# Patient Record
Sex: MALE | Race: WHITE | NOT HISPANIC OR LATINO | Employment: OTHER | URBAN - METROPOLITAN AREA
[De-identification: names, ages, dates, MRNs, and addresses within clinical notes are randomized per-mention and may not be internally consistent; named-entity substitution may affect disease eponyms.]

---

## 2023-05-17 ENCOUNTER — TELEPHONE (OUTPATIENT)
Dept: PRIMARY CARE | Facility: CLINIC | Age: 68
End: 2023-05-17
Payer: COMMERCIAL

## 2023-06-27 ENCOUNTER — TELEPHONE (OUTPATIENT)
Dept: PRIMARY CARE | Facility: CLINIC | Age: 68
End: 2023-06-27
Payer: COMMERCIAL

## 2023-11-06 ENCOUNTER — TELEPHONE (OUTPATIENT)
Dept: PRIMARY CARE | Facility: CLINIC | Age: 68
End: 2023-11-06
Payer: COMMERCIAL

## 2023-11-06 NOTE — TELEPHONE ENCOUNTER
Pt wife called and st that pt just got out of [facility] and was discharged last Monday. He needs an order sent in to them for home health care services. Can you put an order in so they can begin this?

## 2023-11-09 ENCOUNTER — OFFICE VISIT (OUTPATIENT)
Dept: PRIMARY CARE | Facility: CLINIC | Age: 68
End: 2023-11-09
Payer: COMMERCIAL

## 2023-11-09 VITALS — OXYGEN SATURATION: 98 % | DIASTOLIC BLOOD PRESSURE: 80 MMHG | SYSTOLIC BLOOD PRESSURE: 120 MMHG | HEART RATE: 78 BPM

## 2023-11-09 DIAGNOSIS — G47.09 OTHER INSOMNIA: ICD-10-CM

## 2023-11-09 DIAGNOSIS — I69.351 HEMIPLEGIA AND HEMIPARESIS FOLLOWING CEREBRAL INFARCTION AFFECTING RIGHT DOMINANT SIDE (MULTI): ICD-10-CM

## 2023-11-09 DIAGNOSIS — F32.2 AGITATED DEPRESSION (MULTI): ICD-10-CM

## 2023-11-09 DIAGNOSIS — R45.1 RESTLESSNESS AND AGITATION: ICD-10-CM

## 2023-11-09 DIAGNOSIS — Z93.0 TRACHEOSTOMY STATUS (MULTI): ICD-10-CM

## 2023-11-09 DIAGNOSIS — I69.320 APHASIA FOLLOWING CEREBRAL INFARCTION: ICD-10-CM

## 2023-11-09 DIAGNOSIS — Z23 NEED FOR INFLUENZA VACCINATION: ICD-10-CM

## 2023-11-09 DIAGNOSIS — S82.831S CLOSED FRACTURE OF DISTAL END OF RIGHT FIBULA, UNSPECIFIED FRACTURE MORPHOLOGY, SEQUELA: ICD-10-CM

## 2023-11-09 DIAGNOSIS — I69.391 DYSPHAGIA FOLLOWING CEREBRAL INFARCTION: ICD-10-CM

## 2023-11-09 DIAGNOSIS — I63.9 ISCHEMIC CEREBROVASCULAR ACCIDENT (CVA) (MULTI): Primary | ICD-10-CM

## 2023-11-09 DIAGNOSIS — R53.1 GENERALIZED WEAKNESS: ICD-10-CM

## 2023-11-09 DIAGNOSIS — J95.00 TRACHEOSTOMY COMPLICATION, UNSPECIFIED COMPLICATION TYPE (MULTI): ICD-10-CM

## 2023-11-09 DIAGNOSIS — E78.2 MIXED HYPERLIPIDEMIA: ICD-10-CM

## 2023-11-09 DIAGNOSIS — F41.8 DEPRESSION WITH ANXIETY: ICD-10-CM

## 2023-11-09 DIAGNOSIS — I10 PRIMARY HYPERTENSION: ICD-10-CM

## 2023-11-09 PROBLEM — G47.00 INSOMNIA, UNSPECIFIED: Status: ACTIVE | Noted: 2023-08-12

## 2023-11-09 PROBLEM — Z99.11: Status: ACTIVE | Noted: 2023-06-16

## 2023-11-09 PROBLEM — H61.22 IMPACTED CERUMEN OF LEFT EAR: Status: ACTIVE | Noted: 2023-11-09

## 2023-11-09 PROBLEM — E78.5 HYPERLIPIDEMIA: Status: ACTIVE | Noted: 2023-06-09

## 2023-11-09 PROBLEM — M62.81 MUSCLE WEAKNESS (GENERALIZED): Status: ACTIVE | Noted: 2023-08-11

## 2023-11-09 PROBLEM — K59.00 CONSTIPATION, UNSPECIFIED: Status: ACTIVE | Noted: 2023-08-11

## 2023-11-09 PROBLEM — R26.89 OTHER ABNORMALITIES OF GAIT AND MOBILITY: Status: ACTIVE | Noted: 2023-08-11

## 2023-11-09 PROBLEM — E66.812 OBESITY, CLASS II, BMI 35-39.9: Status: ACTIVE | Noted: 2023-06-09

## 2023-11-09 PROBLEM — S82.891A CLOSED FRACTURE OF RIGHT ANKLE: Status: ACTIVE | Noted: 2023-06-14

## 2023-11-09 PROBLEM — G93.6 CEREBRAL EDEMA (MULTI): Status: ACTIVE | Noted: 2023-06-09

## 2023-11-09 PROBLEM — I63.132: Status: ACTIVE | Noted: 2023-08-11

## 2023-11-09 PROBLEM — E66.9 OBESITY: Status: ACTIVE | Noted: 2023-11-09

## 2023-11-09 PROBLEM — R01.1 MURMUR, HEART: Status: ACTIVE | Noted: 2023-11-09

## 2023-11-09 PROBLEM — E66.9 OBESITY, CLASS II, BMI 35-39.9: Status: ACTIVE | Noted: 2023-06-09

## 2023-11-09 PROBLEM — R73.9 HYPERGLYCEMIA, UNSPECIFIED: Status: ACTIVE | Noted: 2023-08-11

## 2023-11-09 PROBLEM — J96.10 CHRONIC RESPIRATORY FAILURE (MULTI): Status: ACTIVE | Noted: 2023-08-17

## 2023-11-09 PROBLEM — I62.9 INTRACRANIAL HEMORRHAGE (MULTI): Status: ACTIVE | Noted: 2023-06-08

## 2023-11-09 PROBLEM — F32.A DEPRESSION, UNSPECIFIED: Status: ACTIVE | Noted: 2023-08-11

## 2023-11-09 PROCEDURE — 1160F RVW MEDS BY RX/DR IN RCRD: CPT | Performed by: FAMILY MEDICINE

## 2023-11-09 PROCEDURE — 1036F TOBACCO NON-USER: CPT | Performed by: FAMILY MEDICINE

## 2023-11-09 PROCEDURE — 90471 IMMUNIZATION ADMIN: CPT | Performed by: FAMILY MEDICINE

## 2023-11-09 PROCEDURE — 90662 IIV NO PRSV INCREASED AG IM: CPT | Performed by: FAMILY MEDICINE

## 2023-11-09 PROCEDURE — 3079F DIAST BP 80-89 MM HG: CPT | Performed by: FAMILY MEDICINE

## 2023-11-09 PROCEDURE — 1159F MED LIST DOCD IN RCRD: CPT | Performed by: FAMILY MEDICINE

## 2023-11-09 PROCEDURE — 3074F SYST BP LT 130 MM HG: CPT | Performed by: FAMILY MEDICINE

## 2023-11-09 PROCEDURE — 99215 OFFICE O/P EST HI 40 MIN: CPT | Performed by: FAMILY MEDICINE

## 2023-11-09 RX ORDER — MIRTAZAPINE 15 MG/1
15 TABLET, FILM COATED ORAL NIGHTLY
COMMUNITY
Start: 2023-10-26 | End: 2023-11-09 | Stop reason: SDUPTHER

## 2023-11-09 RX ORDER — ATORVASTATIN CALCIUM 40 MG/1
40 TABLET, FILM COATED ORAL DAILY
Qty: 90 TABLET | Refills: 1 | Status: SHIPPED | OUTPATIENT
Start: 2023-11-09 | End: 2024-03-15 | Stop reason: SDUPTHER

## 2023-11-09 RX ORDER — QUETIAPINE FUMARATE 25 MG/1
25 TABLET, FILM COATED ORAL NIGHTLY
Qty: 90 TABLET | Refills: 1 | Status: SHIPPED | OUTPATIENT
Start: 2023-11-09 | End: 2024-03-15 | Stop reason: SDUPTHER

## 2023-11-09 RX ORDER — MIRTAZAPINE 15 MG/1
15 TABLET, FILM COATED ORAL NIGHTLY
Qty: 90 TABLET | Refills: 1 | Status: SHIPPED | OUTPATIENT
Start: 2023-11-09 | End: 2024-02-19 | Stop reason: SDUPTHER

## 2023-11-09 RX ORDER — SERTRALINE HYDROCHLORIDE 100 MG/1
100 TABLET, FILM COATED ORAL DAILY
Qty: 100 TABLET | Refills: 1 | Status: SHIPPED | OUTPATIENT
Start: 2023-11-09 | End: 2024-03-15 | Stop reason: SDUPTHER

## 2023-11-09 RX ORDER — GUAIFENESIN 100 MG/5ML
200 SOLUTION ORAL 3 TIMES DAILY PRN
Qty: 120 ML | Refills: 3 | Status: SHIPPED | OUTPATIENT
Start: 2023-11-09 | End: 2023-11-19

## 2023-11-09 RX ORDER — FAMOTIDINE 20 MG/1
20 TABLET, FILM COATED ORAL 2 TIMES DAILY
Qty: 180 TABLET | Refills: 1 | Status: SHIPPED | OUTPATIENT
Start: 2023-11-09 | End: 2024-03-15 | Stop reason: SDUPTHER

## 2023-11-09 RX ORDER — FAMOTIDINE 20 MG/1
20 TABLET, FILM COATED ORAL DAILY
COMMUNITY
Start: 2023-07-20 | End: 2023-11-09 | Stop reason: SDUPTHER

## 2023-11-09 RX ORDER — ATORVASTATIN CALCIUM 40 MG/1
40 TABLET, FILM COATED ORAL DAILY
COMMUNITY
Start: 2023-06-21 | End: 2023-11-09 | Stop reason: SDUPTHER

## 2023-11-09 RX ORDER — SERTRALINE HYDROCHLORIDE 100 MG/1
100 TABLET, FILM COATED ORAL DAILY
COMMUNITY
Start: 2023-10-30 | End: 2023-11-09 | Stop reason: SDUPTHER

## 2023-11-09 RX ORDER — QUETIAPINE FUMARATE 25 MG/1
25 TABLET, FILM COATED ORAL NIGHTLY
COMMUNITY
Start: 2023-10-24 | End: 2023-11-09 | Stop reason: SDUPTHER

## 2023-11-09 RX ORDER — ONDANSETRON 4 MG/1
4 TABLET, FILM COATED ORAL EVERY 8 HOURS PRN
COMMUNITY
End: 2024-03-15 | Stop reason: ALTCHOICE

## 2023-11-09 ASSESSMENT — ENCOUNTER SYMPTOMS
LOSS OF SENSATION IN FEET: 0
DEPRESSION: 1
OCCASIONAL FEELINGS OF UNSTEADINESS: 1

## 2023-11-09 NOTE — PROGRESS NOTES
Subjective   Patient ID: New Pierre is a 68 y.o. male who presents for Nursing Home Follow Up.  HPI    Presented to Ed at [facility] 6/8/2023due to slurred speech and suspected CVA.  He had called his wife and told her he did not feel well - was slurring and she called EMS and headed to job site as she was concerned that he might be having a stroke.  He was on work site and had fallen between time of call to his wife and the time EMS arrived to find him    CT with ischemic stroke noted -     Tracheotomy and PEG tube placed  6/17/2023 for suspected dysphagia and the secretions worsening    Transferred to [facility] step-down type facility on 6/21/2023    Transferred to Rehab facility - the [facility]   PEG tube removed     8/17/2023 Tracheostomy revision after the patient dislodged the tube    10/15/2023 - ED visit after dislodging Tracheostomy tube    He is able to eat solids without reflux    Neurology previously followed by YSABEL Martínez - no follow up appointment scheduled    He is currently living at home since 10/30/2023.  No home care evaluation yet - coming tomorrow.    NO ortho follow up scheduled - review of xrays with commuted fracture of distal femur     INCONTINENCE: interested in a condom style catheter as he has difficulty reaching the toilet in time  They currently have a private company providing homecare for him.    Review of Systems    Review of Systems negative except as noted in HPI and Chief complaint.     Objective               Physical Exam  HENT:      Head:      Comments: Right facial droop  Cardiovascular:      Rate and Rhythm: Normal rate and regular rhythm.      Heart sounds: Normal heart sounds. No murmur heard.  Pulmonary:      Effort: Pulmonary effort is normal.      Breath sounds: Normal breath sounds. No wheezing or rhonchi.   Musculoskeletal:      Comments: Right upper extremity hemiparalysis with some contracture noted  Right legt with hemiparalysis noted, foot drop    Skin:     Comments: Tracheostomy site with healing granulomatous tissue   Neurological:      Mental Status: He is alert.   Psychiatric:      Comments: Aphasia noted - able to answer in whispers, good eye contact.       /80 (BP Location: Left arm, Patient Position: Sitting, BP Cuff Size: Adult)   Pulse 78   SpO2 98%      Assessment/Plan   Problem List Items Addressed This Visit       Tracheostomy complication (CMS/[facility])     Homecare ordered - will have healing tracheostomy site evaluation.  Consider referral to wound care if not improving.         Relevant Medications    guaiFENesin (Robitussin) 100 mg/5 mL syrup    Restlessness and agitation     Continuing with Seroquel for prn agitation.         Ischemic cerebrovascular accident (CVA) (CMS/[facility]) - Primary    Relevant Orders    Referral to Home Care    Referral to Neurology    Insomnia, unspecified     Continue with Mirtazapine at current dosage.  Follow up at least annually.         Relevant Medications    mirtazapine (Remeron) 15 mg tablet    QUEtiapine (SEROquel) 25 mg tablet    Hypertension    Relevant Orders    Referral to Home Care    CBC    Hyperlipidemia     Continue with Atorvastatin pending review of fasting labs.         Relevant Medications    atorvastatin (Lipitor) 40 mg tablet    Other Relevant Orders    Referral to Home Care    Comprehensive metabolic panel    Lipid Panel    Hemiplegia and hemiparesis following cerebral infarction affecting right dominant side (CMS/[facility])     Homecare and PT ordered.         Relevant Orders    Referral to Home Care    Tracheostomy status (CMS/[facility])    Relevant Orders    Referral to Home Care    Dysphagia following cerebral infarction     Home care evaluation for OT/ST and possible swallowing evaluation.         Relevant Medications    famotidine (Pepcid) 20 mg tablet    Other Relevant Orders    Referral to Home Care    Aphasia following cerebral infarction     Homecare evaluation ordered.  Recommend ST for speech  issues.         Relevant Orders    Referral to Home Care     Other Visit Diagnoses       Generalized weakness        Relevant Orders    Referral to Home Care    Depression with anxiety        Relevant Medications    sertraline (Zoloft) 100 mg tablet    Agitated depression (CMS/HCC)        Relevant Medications    mirtazapine (Remeron) 15 mg tablet    Closed fracture of distal end of right fibula, unspecified fracture morphology, sequela        Relevant Orders    Referral to Orthopaedic Surgery    Need for influenza vaccination        Relevant Orders    Flu vaccine, quadrivalent, high-dose, preservative free, age 65y+ (FLUZONE) (Completed)            Time Spent  Prep time on day of patient encounter: 5 minutes  Time spent directly with patient, family or caregiver: 30 minutes  Additional Time Spent on Patient Care Activities: 5 minutes  Documentation Time: 10 minutes  Other Time Spent: 0 minutes  Total: 50 minutes    Patient was identified as a fall risk. Risk prevention instructions provided.    FOLLOW UP 1 MONTH.

## 2023-11-10 NOTE — PATIENT INSTRUCTIONS

## 2023-11-10 NOTE — ASSESSMENT & PLAN NOTE
Homecare ordered - will have healing tracheostomy site evaluation.  Consider referral to wound care if not improving.

## 2023-11-13 ENCOUNTER — APPOINTMENT (OUTPATIENT)
Dept: ORTHOPEDIC SURGERY | Facility: CLINIC | Age: 68
End: 2023-11-13
Payer: COMMERCIAL

## 2023-11-13 ENCOUNTER — LAB (OUTPATIENT)
Dept: LAB | Facility: LAB | Age: 68
End: 2023-11-13
Payer: COMMERCIAL

## 2023-11-13 DIAGNOSIS — E78.2 MIXED HYPERLIPIDEMIA: ICD-10-CM

## 2023-11-13 DIAGNOSIS — I10 PRIMARY HYPERTENSION: ICD-10-CM

## 2023-11-13 LAB
ALBUMIN SERPL BCP-MCNC: 3.7 G/DL (ref 3.4–5)
ALP SERPL-CCNC: 80 U/L (ref 33–136)
ALT SERPL W P-5'-P-CCNC: 11 U/L (ref 10–52)
ANION GAP SERPL CALC-SCNC: 13 MMOL/L (ref 10–20)
AST SERPL W P-5'-P-CCNC: 15 U/L (ref 9–39)
BILIRUB SERPL-MCNC: 0.7 MG/DL (ref 0–1.2)
BUN SERPL-MCNC: 16 MG/DL (ref 6–23)
CALCIUM SERPL-MCNC: 9.4 MG/DL (ref 8.6–10.6)
CHLORIDE SERPL-SCNC: 105 MMOL/L (ref 98–107)
CHOLEST SERPL-MCNC: 128 MG/DL (ref 0–199)
CHOLESTEROL/HDL RATIO: 3.6
CO2 SERPL-SCNC: 28 MMOL/L (ref 21–32)
CREAT SERPL-MCNC: 0.62 MG/DL (ref 0.5–1.3)
ERYTHROCYTE [DISTWIDTH] IN BLOOD BY AUTOMATED COUNT: 13.7 % (ref 11.5–14.5)
GFR SERPL CREATININE-BSD FRML MDRD: >90 ML/MIN/1.73M*2
GLUCOSE SERPL-MCNC: 105 MG/DL (ref 74–99)
HCT VFR BLD AUTO: 45.3 % (ref 41–52)
HDLC SERPL-MCNC: 35.9 MG/DL
HGB BLD-MCNC: 14.7 G/DL (ref 13.5–17.5)
LDLC SERPL CALC-MCNC: 63 MG/DL
MCH RBC QN AUTO: 29.4 PG (ref 26–34)
MCHC RBC AUTO-ENTMCNC: 32.5 G/DL (ref 32–36)
MCV RBC AUTO: 91 FL (ref 80–100)
NON HDL CHOLESTEROL: 92 MG/DL (ref 0–149)
NRBC BLD-RTO: 0 /100 WBCS (ref 0–0)
PLATELET # BLD AUTO: 316 X10*3/UL (ref 150–450)
POTASSIUM SERPL-SCNC: 4.2 MMOL/L (ref 3.5–5.3)
PROT SERPL-MCNC: 6.4 G/DL (ref 6.4–8.2)
RBC # BLD AUTO: 5 X10*6/UL (ref 4.5–5.9)
SODIUM SERPL-SCNC: 142 MMOL/L (ref 136–145)
TRIGL SERPL-MCNC: 148 MG/DL (ref 0–149)
VLDL: 30 MG/DL (ref 0–40)
WBC # BLD AUTO: 7.3 X10*3/UL (ref 4.4–11.3)

## 2023-11-13 PROCEDURE — 85027 COMPLETE CBC AUTOMATED: CPT

## 2023-11-13 PROCEDURE — 36415 COLL VENOUS BLD VENIPUNCTURE: CPT

## 2023-11-13 PROCEDURE — 80061 LIPID PANEL: CPT

## 2023-11-13 PROCEDURE — 80053 COMPREHEN METABOLIC PANEL: CPT

## 2023-11-16 ENCOUNTER — ANCILLARY PROCEDURE (OUTPATIENT)
Dept: RADIOLOGY | Facility: CLINIC | Age: 68
End: 2023-11-16
Payer: COMMERCIAL

## 2023-11-16 ENCOUNTER — OFFICE VISIT (OUTPATIENT)
Dept: ORTHOPEDIC SURGERY | Facility: CLINIC | Age: 68
End: 2023-11-16
Payer: COMMERCIAL

## 2023-11-16 VITALS — HEIGHT: 68 IN | BODY MASS INDEX: 26.83 KG/M2 | WEIGHT: 177 LBS

## 2023-11-16 DIAGNOSIS — M25.571 RIGHT ANKLE PAIN, UNSPECIFIED CHRONICITY: ICD-10-CM

## 2023-11-16 PROCEDURE — 99204 OFFICE O/P NEW MOD 45 MIN: CPT | Performed by: SPECIALIST

## 2023-11-16 PROCEDURE — 1160F RVW MEDS BY RX/DR IN RCRD: CPT | Performed by: SPECIALIST

## 2023-11-16 PROCEDURE — 73610 X-RAY EXAM OF ANKLE: CPT | Mod: RIGHT SIDE | Performed by: RADIOLOGY

## 2023-11-16 PROCEDURE — 73610 X-RAY EXAM OF ANKLE: CPT | Mod: RT,FY

## 2023-11-16 PROCEDURE — L1902 AFO ANKLE GAUNTLET PRE OTS: HCPCS | Performed by: SPECIALIST

## 2023-11-16 PROCEDURE — 1159F MED LIST DOCD IN RCRD: CPT | Performed by: SPECIALIST

## 2023-11-16 PROCEDURE — 1036F TOBACCO NON-USER: CPT | Performed by: SPECIALIST

## 2023-11-16 NOTE — PROGRESS NOTES
June 8th  - patient had a stroke and fell.   His caretaker states that he was in a cast until around end of July or early August   States he is no longer complaining of pain, he is at a rehab and they want to start PT - wanted his weight bearing status updated.   No surgery was done     Xrays done today    History of Present Illness: Right ankle fracture    Mechanism of injury: Fall during stroke  Date of Injury/Pain: Above  Location of pain: None  Quality of pain: None  Frequency of Pain: None  Associated symptoms: Right hemiparesis  Previous treatment: Stroke related    27 point review of systems negative except what is stated in HPI    On examination of the right ankle/foot:  Wheelchair-bound currently gait  Neutral alignment  Minimal swelling; none ecchymosis or erythema. Skin intact; no ulcers or lesions.   Full passive   ROM in  ankle plantarflexion, dorsiflexion, inversion and eversion; normal ROM in 2-5th toe flexion/extension and 1st MTP flexion/extension  Right-sided hemiparesis without active motion  Tenderness to palpation: None  No tenderness to palpation over the Achilles, medial and lateral malleolus, posterior tibial tendon, peroneal tendon, ATFL, deltoid ligament, talus or navicular.  No tenderness to palpation over heel, plantar arch, base of 1st metatarsal/2nd metatarsal, sesamoids, 5th metatarsal, medial cuneiform, navicular, 1st MTP joint or 2nd or 3rd intermetarsal space.  Diminished motor and sensation. Good capillary refill. No sensory deficit to light touch. Normal proprioception. Dorsalis pedis and posterior tibial pulses 2+ bilaterally.        I personally reviewed the patient's x-ray images and reports of the right ankle. The xrays show remote Anne B type lateral malleolus fracture with complete healing, slight malunion with rotation and shortening but mortise stable    ASSESSMENT: Mall union right lateral malleolus ankle fracture.  On exam and radiograph appears stable enough to allow  weightbearing, but would place him in an athletic type ankle brace today for more confidence and support.    PLAN: Treatment options were discussed with the patient.  Progress with weightbearing as tolerated physical therapy with light weight bracing as needed.  Follow-up as needed    This note was dictated using speech recognition software and was not corrected for spelling or grammatical errors

## 2023-11-28 ENCOUNTER — TELEPHONE (OUTPATIENT)
Dept: PRIMARY CARE | Facility: CLINIC | Age: 68
End: 2023-11-28
Payer: COMMERCIAL

## 2023-11-28 NOTE — TELEPHONE ENCOUNTER
Home Care nurse calling in asking if you would be able to write an RX for an arm sling for patient?    Pended order.

## 2023-11-30 ENCOUNTER — APPOINTMENT (OUTPATIENT)
Dept: NEUROLOGY | Facility: CLINIC | Age: 68
End: 2023-11-30
Payer: COMMERCIAL

## 2023-11-30 DIAGNOSIS — I69.959 SPASTIC HEMIPLEGIA AS LATE EFFECT OF CEREBROVASCULAR DISEASE, UNSPECIFIED CEREBROVASCULAR DISEASE TYPE, UNSPECIFIED LATERALITY (MULTI): ICD-10-CM

## 2023-11-30 DIAGNOSIS — I63.9 ISCHEMIC CEREBROVASCULAR ACCIDENT (CVA) (MULTI): Primary | ICD-10-CM

## 2023-12-04 ENCOUNTER — APPOINTMENT (OUTPATIENT)
Dept: PRIMARY CARE | Facility: CLINIC | Age: 68
End: 2023-12-04
Payer: COMMERCIAL

## 2023-12-19 ENCOUNTER — OFFICE VISIT (OUTPATIENT)
Dept: PRIMARY CARE | Facility: CLINIC | Age: 68
End: 2023-12-19
Payer: COMMERCIAL

## 2023-12-19 VITALS — DIASTOLIC BLOOD PRESSURE: 80 MMHG | HEART RATE: 80 BPM | OXYGEN SATURATION: 97 % | SYSTOLIC BLOOD PRESSURE: 120 MMHG

## 2023-12-19 DIAGNOSIS — R01.1 MURMUR: Primary | ICD-10-CM

## 2023-12-19 DIAGNOSIS — I10 PRIMARY HYPERTENSION: ICD-10-CM

## 2023-12-19 DIAGNOSIS — E78.2 MIXED HYPERLIPIDEMIA: ICD-10-CM

## 2023-12-19 DIAGNOSIS — I63.9 ISCHEMIC CEREBROVASCULAR ACCIDENT (CVA) (MULTI): ICD-10-CM

## 2023-12-19 DIAGNOSIS — E66.3 OVERWEIGHT WITH BODY MASS INDEX (BMI) OF 27 TO 27.9 IN ADULT: ICD-10-CM

## 2023-12-19 DIAGNOSIS — I69.391 DYSPHAGIA FOLLOWING CEREBRAL INFARCTION: ICD-10-CM

## 2023-12-19 PROCEDURE — 3079F DIAST BP 80-89 MM HG: CPT | Performed by: FAMILY MEDICINE

## 2023-12-19 PROCEDURE — 3008F BODY MASS INDEX DOCD: CPT | Performed by: FAMILY MEDICINE

## 2023-12-19 PROCEDURE — 3074F SYST BP LT 130 MM HG: CPT | Performed by: FAMILY MEDICINE

## 2023-12-19 PROCEDURE — 99214 OFFICE O/P EST MOD 30 MIN: CPT | Performed by: FAMILY MEDICINE

## 2023-12-19 PROCEDURE — 1159F MED LIST DOCD IN RCRD: CPT | Performed by: FAMILY MEDICINE

## 2023-12-19 PROCEDURE — 1036F TOBACCO NON-USER: CPT | Performed by: FAMILY MEDICINE

## 2023-12-19 PROCEDURE — 1160F RVW MEDS BY RX/DR IN RCRD: CPT | Performed by: FAMILY MEDICINE

## 2023-12-19 RX ORDER — ASPIRIN 325 MG
325 TABLET ORAL DAILY
COMMUNITY

## 2023-12-19 NOTE — PATIENT INSTRUCTIONS

## 2023-12-19 NOTE — PROGRESS NOTES
Subjective   Patient ID: New Pierre is a 68 y.o. male who presents for Follow-up.    HPI    S/P CVA and LEFT carotid stent placement  Initial CVA     ECHO RESULTS REVIEWED FROM [facility] 6/8/2023:  CONCLUSIONS:   - Exam indication: Stroke   - The left ventricle is normal in size. There is no left ventricular hypertrophy.   Left ventricular systolic function is normal. EF = 50-55% (visual est.) Definity   contrast used for endocardial border detection. Normal left ventricular diastolic   function.   - The right ventricle is normal in size. Right ventricular systolic function is   normal.   - Very mild AS present.   - There is no patent foramen ovale as detected by Doppler and agitated saline   contrast.   Agiated saline was performed and showed no signs of PFO.   - The patient has not had a prior  echocardiographic exam for comparison.      No SOB, no chest pain,no edema    OT and PT: coming to house 2 times per week and now reducing to once weekly.    Using catheter as not able to get to the bathroom regularly without assistance    Eating sitting up in recliner to eat - no choking or swallowing difficulties.    Review of Systems    Review of Systems negative except as noted in HPI and Chief complaint.     Objective                 /80 (BP Location: Left arm, Patient Position: Sitting, BP Cuff Size: Adult)   Pulse 80   SpO2 97%      Physical Exam  Constitutional:       General: He is not in acute distress.     Appearance: Normal appearance. He is not ill-appearing.   HENT:      Head: Normocephalic and atraumatic.      Right Ear: Tympanic membrane and external ear normal. There is no impacted cerumen.      Left Ear: Tympanic membrane and external ear normal. There is no impacted cerumen.      Nose: Nose normal.   Eyes:      Conjunctiva/sclera: Conjunctivae normal.   Neck:      Vascular: No carotid bruit.   Cardiovascular:      Rate and Rhythm: Normal rate and regular rhythm.      Pulses: Normal pulses.       Heart sounds: Normal heart sounds. No murmur heard.     No gallop.   Pulmonary:      Effort: Pulmonary effort is normal.      Breath sounds: Normal breath sounds. No wheezing, rhonchi or rales.   Musculoskeletal:      Cervical back: Normal range of motion and neck supple. No rigidity or tenderness.      Comments: Right sided paralysis    Transportation via wheel chair   Lymphadenopathy:      Cervical: No cervical adenopathy.   Skin:     General: Skin is warm and dry.   Neurological:      Mental Status: He is alert.   Psychiatric:      Comments: Verbal limited as patient is s/p trach - patient able nod yes or no         Assessment/Plan   Problem List Items Addressed This Visit       Ischemic cerebrovascular accident (CVA) (CMS/[facility])    Relevant Orders    Referral to Nutrition Services    Follow Up In [facility] - Care Manager    Hypertension    Relevant Orders    Referral to Nutrition Services    Follow Up In [facility] - Care Manager    Hyperlipidemia    Relevant Orders    Referral to Nutrition Services    Follow Up In [facility] - Care Manager    Dysphagia following cerebral infarction    Relevant Orders    Follow Up In [facility] - Care Manager     Other Visit Diagnoses       Murmur    -  Primary    Relevant Orders    Transthoracic Echo (TTE) Limited    Overweight with body mass index (BMI) of 27 to 27.9 in adult        Relevant Orders    Referral to Nutrition Services    Follow Up In [facility] - Care Manager          CHRONIC CARE MANAGEMENT ENROLLMENT VISIT  Patient meets criteria for enrollment in Chronic Care Management - has 2 chronic conditions:  I have discussed the nature and availability of the program with the patient, the patient's responsibility for the applicable cost sharing, as well as limitation that only one practitioner furnishing services can be paid during a calendar month, and their right to stop services at any time effective the end of  the month.    The patient has agreed to enroll.   Effective on today's date.  I initiated a care plan with them that outlines goals that will be achieved with care coordination activities.        Patient was identified as a fall risk. Risk prevention instructions provided.

## 2023-12-21 ENCOUNTER — ALLIED HEALTH (OUTPATIENT)
Dept: PRIMARY CARE | Facility: CLINIC | Age: 68
End: 2023-12-21
Payer: COMMERCIAL

## 2023-12-21 DIAGNOSIS — I69.391 DYSPHAGIA FOLLOWING CEREBRAL INFARCTION: ICD-10-CM

## 2023-12-21 DIAGNOSIS — F32.4 MAJOR DEPRESSIVE DISORDER IN PARTIAL REMISSION, UNSPECIFIED WHETHER RECURRENT (CMS-HCC): ICD-10-CM

## 2023-12-21 DIAGNOSIS — E78.2 MIXED HYPERLIPIDEMIA: ICD-10-CM

## 2023-12-21 DIAGNOSIS — I63.9 ISCHEMIC CEREBROVASCULAR ACCIDENT (CVA) (MULTI): ICD-10-CM

## 2023-12-21 DIAGNOSIS — I69.351 HEMIPLEGIA AND HEMIPARESIS FOLLOWING CEREBRAL INFARCTION AFFECTING RIGHT DOMINANT SIDE (MULTI): ICD-10-CM

## 2023-12-21 DIAGNOSIS — I63.132 CEREBRAL INFARCTION DUE TO EMBOLISM OF LEFT CAROTID ARTERY (MULTI): ICD-10-CM

## 2023-12-21 DIAGNOSIS — M62.81 MUSCLE WEAKNESS (GENERALIZED): ICD-10-CM

## 2023-12-21 DIAGNOSIS — F51.01 PRIMARY INSOMNIA: ICD-10-CM

## 2023-12-21 DIAGNOSIS — I69.320 APHASIA FOLLOWING CEREBRAL INFARCTION: ICD-10-CM

## 2023-12-21 DIAGNOSIS — J95.00 TRACHEOSTOMY COMPLICATION, UNSPECIFIED COMPLICATION TYPE (MULTI): ICD-10-CM

## 2023-12-21 DIAGNOSIS — I10 PRIMARY HYPERTENSION: Primary | ICD-10-CM

## 2023-12-21 PROBLEM — N39.45 CONTINUOUS LEAKAGE OF URINE: Status: ACTIVE | Noted: 2023-12-21

## 2023-12-21 PROCEDURE — 99374 HOME HEALTH CARE SUPERVISION: CPT | Performed by: FAMILY MEDICINE

## 2023-12-27 ENCOUNTER — PATIENT OUTREACH (OUTPATIENT)
Dept: PRIMARY CARE | Facility: CLINIC | Age: 68
End: 2023-12-27
Payer: COMMERCIAL

## 2023-12-27 DIAGNOSIS — I69.391 DYSPHAGIA FOLLOWING CEREBRAL INFARCTION: ICD-10-CM

## 2023-12-27 DIAGNOSIS — I10 PRIMARY HYPERTENSION: ICD-10-CM

## 2023-12-27 DIAGNOSIS — I63.132 CEREBRAL INFARCTION DUE TO EMBOLISM OF LEFT CAROTID ARTERY (MULTI): ICD-10-CM

## 2023-12-27 DIAGNOSIS — I69.351 HEMIPLEGIA AND HEMIPARESIS FOLLOWING CEREBRAL INFARCTION AFFECTING RIGHT DOMINANT SIDE (MULTI): ICD-10-CM

## 2023-12-27 PROCEDURE — 99490 CHRNC CARE MGMT STAFF 1ST 20: CPT | Performed by: FAMILY MEDICINE

## 2023-12-27 NOTE — CARE PLAN
Patient introduced to Chronic Care Management Services   Patient's spouse opted into services on 12/27/23  Services will be performed under the direction of PCP: Gillian De Santiago    I have discussed the nature and availability of the service with the patient, the patient's responsibility for potential cost sharing, only one practitioner furnishing services during a calendar month, and the right to stop services at any time.     Spoke with patient's spouse Zabrina. Patient is unable to communicate verbally on phone.  His wife works from home and provides patient's care. No family support available, no children, no siblings. Home health aids to assist with patient's care twice daily. He needs assistance in and out of bed using norris lift. Patient's wife stated that he is improving and working towards no longer needing norris lift. Patient having speech therapy at time of call, his wife needed to go to be able to participate. She will reach out as needed with any non-urgent questions or concerns. CM to mail flyer with contact information.

## 2024-01-12 ENCOUNTER — APPOINTMENT (OUTPATIENT)
Dept: CARDIOLOGY | Facility: CLINIC | Age: 69
End: 2024-01-12
Payer: COMMERCIAL

## 2024-01-19 ENCOUNTER — APPOINTMENT (OUTPATIENT)
Dept: NUTRITION | Facility: CLINIC | Age: 69
End: 2024-01-19
Payer: COMMERCIAL

## 2024-01-24 ENCOUNTER — APPOINTMENT (OUTPATIENT)
Dept: CARDIOLOGY | Facility: CLINIC | Age: 69
End: 2024-01-24
Payer: COMMERCIAL

## 2024-02-05 ENCOUNTER — PATIENT OUTREACH (OUTPATIENT)
Dept: PRIMARY CARE | Facility: CLINIC | Age: 69
End: 2024-02-05
Payer: COMMERCIAL

## 2024-02-05 DIAGNOSIS — I69.351 HEMIPLEGIA AND HEMIPARESIS FOLLOWING CEREBRAL INFARCTION AFFECTING RIGHT DOMINANT SIDE (MULTI): ICD-10-CM

## 2024-02-05 PROCEDURE — 99490 CHRNC CARE MGMT STAFF 1ST 20: CPT | Performed by: FAMILY MEDICINE

## 2024-02-05 NOTE — CARE PLAN
Spoke with patient's spouse Zabrina. Patient is unable to communicate verbally on phone.  His wife works from home and provides patient's care. Home health aids to assist with patient's care twice daily. They have been paying out of pocket for his care. They have a meeting this Wednesday with [facility] to discuss [facility] program to help with services and supplies. Patient previously was using norris lift to get in and out of bed. He no longer needs to use, aids are using slide board for transfers. The health aids are having him stand 4 to 5 times per day, and he is learning to pivot. Therapy has helped him with more independently dressing himself. He is progressing with his cognitive exercises. Patient has been using hand weights to strength his arm affected by stroke. He has been working with speech therapy and is able to say a couple of words. He is still talking mainly from his throat, speech therapy is working on him forming words. He uses a talking device to communicate. His stoma is doing well, no bleeding or drainage noted. Patient's skin has been dry, and is normally oily. He also has dandruff. Zabrina purchased a bed bath basin and humidifier to help alleviate. Zabrina is hoping that he will be able to have respite care to help with showering and allow her to leave home. Reviewed upcoming appointments. Patient has an appointment with PCP and neurology on 3/15/24. No other questions or concerns identified at this time. Patient's wife will reach out as needed with any non-urgent questions or concerns.

## 2024-02-09 ENCOUNTER — APPOINTMENT (OUTPATIENT)
Dept: NUTRITION | Facility: CLINIC | Age: 69
End: 2024-02-09
Payer: COMMERCIAL

## 2024-02-19 DIAGNOSIS — G47.09 OTHER INSOMNIA: ICD-10-CM

## 2024-02-19 DIAGNOSIS — F32.2 AGITATED DEPRESSION (MULTI): ICD-10-CM

## 2024-02-20 RX ORDER — MIRTAZAPINE 15 MG/1
15 TABLET, FILM COATED ORAL NIGHTLY
Qty: 90 TABLET | Refills: 0 | Status: SHIPPED | OUTPATIENT
Start: 2024-02-20 | End: 2024-03-15 | Stop reason: SINTOL

## 2024-03-04 ENCOUNTER — PATIENT OUTREACH (OUTPATIENT)
Dept: PRIMARY CARE | Facility: CLINIC | Age: 69
End: 2024-03-04
Payer: COMMERCIAL

## 2024-03-04 DIAGNOSIS — M62.81 MUSCLE WEAKNESS (GENERALIZED): ICD-10-CM

## 2024-03-04 DIAGNOSIS — I10 PRIMARY HYPERTENSION: ICD-10-CM

## 2024-03-04 DIAGNOSIS — J95.00 TRACHEOSTOMY COMPLICATION, UNSPECIFIED COMPLICATION TYPE (MULTI): ICD-10-CM

## 2024-03-04 DIAGNOSIS — I69.351 HEMIPLEGIA AND HEMIPARESIS FOLLOWING CEREBRAL INFARCTION AFFECTING RIGHT DOMINANT SIDE (MULTI): ICD-10-CM

## 2024-03-04 DIAGNOSIS — I63.132 CEREBRAL INFARCTION DUE TO EMBOLISM OF LEFT CAROTID ARTERY (MULTI): ICD-10-CM

## 2024-03-04 PROCEDURE — 99490 CHRNC CARE MGMT STAFF 1ST 20: CPT | Performed by: FAMILY MEDICINE

## 2024-03-06 NOTE — CARE PLAN
Reviewed chart prior to [facility] patient outreach. Spoke with patient's spouse Zabrina. Patient is unable to communicate verbally on phone.  His wife works from home and provides patient's care. Home health aides to assist with patient's care twice daily. Patient is now going to adult day care on Mondays and has showers at facility. His wife is now going to the office on Mondays. They met with [facility], the agency is currently looking for home health aides for [facility]. They are currently paying out of pocket for home health aides through two different agencies to get adequate coverage. Patient has transitioned for using norris lift to slide board and is now able to stand and pivot. He is now able to stand with 1 hand on walker. Patient is working with therapy to and home health aides to be more independent with his care. Patient has been using hand weights to strength his arm affected by stroke. He is using the arm and leg affected by the stroke more. Patient may require a brace to support his weaker leg, the supervisor for physical therapy to follow up. He has been working with speech therapy and is able to say a couple of words. He is still talking mainly from his throat, speech therapy is working on him forming words. Patient's spouse inquired speech therapy via Zoom/telehealth would be an option. CM to look into. He uses a talking device to communicate. His stoma is doing well, no bleeding or drainage noted. Zabrina purchased a humidifier and is using Cerave to help moisturize skin. Reviewed upcoming appointments. Patient has an appointment with PCP and neurology on 3/15/24. No other questions or concerns identified at this time. Patient's wife will reach out as needed with any non-urgent questions or concerns.

## 2024-03-15 ENCOUNTER — OFFICE VISIT (OUTPATIENT)
Dept: NEUROLOGY | Facility: CLINIC | Age: 69
End: 2024-03-15
Payer: COMMERCIAL

## 2024-03-15 ENCOUNTER — OFFICE VISIT (OUTPATIENT)
Dept: PRIMARY CARE | Facility: CLINIC | Age: 69
End: 2024-03-15
Payer: COMMERCIAL

## 2024-03-15 VITALS — SYSTOLIC BLOOD PRESSURE: 122 MMHG | HEART RATE: 68 BPM | DIASTOLIC BLOOD PRESSURE: 60 MMHG | OXYGEN SATURATION: 98 %

## 2024-03-15 VITALS — SYSTOLIC BLOOD PRESSURE: 113 MMHG | TEMPERATURE: 97.3 F | HEART RATE: 73 BPM | DIASTOLIC BLOOD PRESSURE: 64 MMHG

## 2024-03-15 DIAGNOSIS — I63.9 ISCHEMIC CEREBROVASCULAR ACCIDENT (CVA) (MULTI): ICD-10-CM

## 2024-03-15 DIAGNOSIS — R73.09 ELEVATED GLUCOSE: ICD-10-CM

## 2024-03-15 DIAGNOSIS — E78.2 MIXED HYPERLIPIDEMIA: ICD-10-CM

## 2024-03-15 DIAGNOSIS — R73.9 HYPERGLYCEMIA, UNSPECIFIED: ICD-10-CM

## 2024-03-15 DIAGNOSIS — I69.391 DYSPHAGIA FOLLOWING CEREBRAL INFARCTION: ICD-10-CM

## 2024-03-15 DIAGNOSIS — G47.09 OTHER INSOMNIA: ICD-10-CM

## 2024-03-15 DIAGNOSIS — I69.351 HEMIPLEGIA AND HEMIPARESIS FOLLOWING CEREBRAL INFARCTION AFFECTING RIGHT DOMINANT SIDE (MULTI): ICD-10-CM

## 2024-03-15 DIAGNOSIS — I69.320 APHASIA FOLLOWING CEREBRAL INFARCTION: Primary | ICD-10-CM

## 2024-03-15 DIAGNOSIS — F32.2 AGITATED DEPRESSION (MULTI): ICD-10-CM

## 2024-03-15 DIAGNOSIS — L71.9 ROSACEA: ICD-10-CM

## 2024-03-15 DIAGNOSIS — I10 PRIMARY HYPERTENSION: Primary | ICD-10-CM

## 2024-03-15 DIAGNOSIS — F33.41 RECURRENT MAJOR DEPRESSIVE DISORDER, IN PARTIAL REMISSION (CMS-HCC): ICD-10-CM

## 2024-03-15 DIAGNOSIS — F41.8 DEPRESSION WITH ANXIETY: ICD-10-CM

## 2024-03-15 PROBLEM — Z99.11: Status: RESOLVED | Noted: 2023-06-16 | Resolved: 2024-03-15

## 2024-03-15 PROBLEM — J95.00 TRACHEOSTOMY COMPLICATION (MULTI): Status: RESOLVED | Noted: 2023-08-17 | Resolved: 2024-03-15

## 2024-03-15 PROBLEM — Z93.0 TRACHEOSTOMY STATUS (MULTI): Status: RESOLVED | Noted: 2023-08-11 | Resolved: 2024-03-15

## 2024-03-15 PROCEDURE — 1036F TOBACCO NON-USER: CPT | Performed by: FAMILY MEDICINE

## 2024-03-15 PROCEDURE — 1159F MED LIST DOCD IN RCRD: CPT | Performed by: PSYCHIATRY & NEUROLOGY

## 2024-03-15 PROCEDURE — 1160F RVW MEDS BY RX/DR IN RCRD: CPT | Performed by: FAMILY MEDICINE

## 2024-03-15 PROCEDURE — 1160F RVW MEDS BY RX/DR IN RCRD: CPT | Performed by: PSYCHIATRY & NEUROLOGY

## 2024-03-15 PROCEDURE — 1159F MED LIST DOCD IN RCRD: CPT | Performed by: FAMILY MEDICINE

## 2024-03-15 PROCEDURE — 3008F BODY MASS INDEX DOCD: CPT | Performed by: PSYCHIATRY & NEUROLOGY

## 2024-03-15 PROCEDURE — 1123F ACP DISCUSS/DSCN MKR DOCD: CPT | Performed by: FAMILY MEDICINE

## 2024-03-15 PROCEDURE — 3078F DIAST BP <80 MM HG: CPT | Performed by: FAMILY MEDICINE

## 2024-03-15 PROCEDURE — 1036F TOBACCO NON-USER: CPT | Performed by: PSYCHIATRY & NEUROLOGY

## 2024-03-15 PROCEDURE — 3078F DIAST BP <80 MM HG: CPT | Performed by: PSYCHIATRY & NEUROLOGY

## 2024-03-15 PROCEDURE — 3074F SYST BP LT 130 MM HG: CPT | Performed by: FAMILY MEDICINE

## 2024-03-15 PROCEDURE — 3074F SYST BP LT 130 MM HG: CPT | Performed by: PSYCHIATRY & NEUROLOGY

## 2024-03-15 PROCEDURE — 3008F BODY MASS INDEX DOCD: CPT | Performed by: FAMILY MEDICINE

## 2024-03-15 PROCEDURE — 1123F ACP DISCUSS/DSCN MKR DOCD: CPT | Performed by: PSYCHIATRY & NEUROLOGY

## 2024-03-15 PROCEDURE — 99214 OFFICE O/P EST MOD 30 MIN: CPT | Performed by: FAMILY MEDICINE

## 2024-03-15 PROCEDURE — 99205 OFFICE O/P NEW HI 60 MIN: CPT | Performed by: PSYCHIATRY & NEUROLOGY

## 2024-03-15 RX ORDER — FAMOTIDINE 20 MG/1
20 TABLET, FILM COATED ORAL 2 TIMES DAILY
Qty: 180 TABLET | Refills: 1 | Status: SHIPPED | OUTPATIENT
Start: 2024-03-15

## 2024-03-15 RX ORDER — ATORVASTATIN CALCIUM 40 MG/1
40 TABLET, FILM COATED ORAL DAILY
Qty: 90 TABLET | Refills: 1 | Status: SHIPPED | OUTPATIENT
Start: 2024-03-15

## 2024-03-15 RX ORDER — QUETIAPINE FUMARATE 25 MG/1
25 TABLET, FILM COATED ORAL NIGHTLY
Qty: 90 TABLET | Refills: 1 | Status: SHIPPED | OUTPATIENT
Start: 2024-03-15

## 2024-03-15 RX ORDER — METRONIDAZOLE 7.5 MG/G
GEL TOPICAL 2 TIMES DAILY
Qty: 45 G | Refills: 2 | Status: SHIPPED | OUTPATIENT
Start: 2024-03-15 | End: 2024-05-13 | Stop reason: SDUPTHER

## 2024-03-15 RX ORDER — SERTRALINE HYDROCHLORIDE 100 MG/1
100 TABLET, FILM COATED ORAL DAILY
Qty: 100 TABLET | Refills: 1 | Status: SHIPPED | OUTPATIENT
Start: 2024-03-15

## 2024-03-15 NOTE — PROGRESS NOTES
NEUROLOGY OUTPATIENT INITIAL VISIT NOTE    Assessment/Plan   Diagnoses and all orders for this visit:  Aphasia following cerebral infarction  -     Referral to Speech Therapy; Future  Hemiplegia and hemiparesis following cerebral infarction affecting right dominant side (CMS/HCC)      IMPRESSION:  Residual mixed (though mostly expressive) aphasia and right hemiplegia from large LMCA-distribution infarct with hemorrhagic conversion.    PLAN:  I advised the patient and his wife about his prognosis, which is that he will not fully recover, given the duration of his symptoms.  However, he will likely have some improvement over the next few months.  I did request Speech Therapy evaluation specifically with neurological rehab in mind.  He will continue ASA and atorvastatin for stroke prophylaxis.  I will see him one more time, in six months or prn, for progress.        Bean Vu Jr., M.D., FAAN     --------      Subjective     New Pierre is a 68 y.o. year old, right-handed male referred by Dr. Gillian Foreman for stroke hospital follow-up.  The patient is unable to provide useful history, but his wife accompanies him, and I reviewed the available [facility] and  EMR.    HPI  On 6/8/2023, the patient was found on the ground at work by a co-worker, unable to speak properly and with right facial weakness and hemiparesis.  EMS was summoned and he presented to the [facility] ED at 1000.  He was given given TNK and also had a thrombectomy and LICA stenting for acute occlusion.  The next day, he had clinical worsening and was found to have bilateral subarachnoid hemorrhage.  TNK was reversed.  He initially required intubation, but was extubated on 6/10, then reintubated because of difficulty with secretion control.  In the meantime, he was found to have a right ankle fracture, presumably related to falling at the onset of his stroke.  Trach and PEG were placed on 6/17.  He was discharged to an LTAC on 6/21.  While  "there, he was treated for pneumonia but ultimately recovered and awakened.  He was ultimately discharged to a rehab and the PEG and trach were removed last fall.    He was taken home by his wife in October and his wife, along with aides, have been caring for the patient.  PT, OT, and Speech were started and eventually discontinued.    Can sit at bed edge on his own  Can sit up from bed with some help  Can stand an pivot with assist of 1.  Can answer questions \"yes-no\" with his iPad, or by shaking his head.    His wife is requesting Speech Therapy services.    He has been on ASA and atorvastatin for stroke prophylaxis.    Review of Systems   Unable to perform ROS: Patient nonverbal   All other systems reviewed and are negative.      No past medical history on file. Denies  PSH:  PEG/Trach    Social History     Tobacco Use    Smoking status: Never    Smokeless tobacco: Never   Substance Use Topics    Alcohol use: Never     family history is not on file.    Current Outpatient Medications:     aspirin 325 mg tablet, Take 1 tablet (325 mg) by mouth once daily., Disp: , Rfl:     atorvastatin (Lipitor) 40 mg tablet, Take 1 tablet (40 mg) by mouth once daily., Disp: 90 tablet, Rfl: 1    famotidine (Pepcid) 20 mg tablet, Take 1 tablet (20 mg) by mouth 2 times a day., Disp: 180 tablet, Rfl: 1    metroNIDAZOLE (Metrogel) 0.75 % gel, Apply topically 2 times a day. Apply twice daily to rosacea, Disp: 45 g, Rfl: 2    mirtazapine (Remeron) 15 mg tablet, Take 1 tablet (15 mg) by mouth once daily at bedtime., Disp: 90 tablet, Rfl: 0    QUEtiapine (SEROquel) 25 mg tablet, Take 1 tablet (25 mg) by mouth once daily at bedtime., Disp: 90 tablet, Rfl: 1    sertraline (Zoloft) 100 mg tablet, Take 1 tablet (100 mg) by mouth once daily., Disp: 100 tablet, Rfl: 1  Allergies   Allergen Reactions    Chlorhexidine Unknown       Objective     /64   Pulse 73   Temp 36.3 °C (97.3 °F)     CONSTITUTIONAL:  No acute " distress    CARDIOVASCULAR:  Normal pulses in the distal legs, mild edema of the right leg, but no edema of either arm or the left leg.  No carotid bruits.    MENTAL STATUS:  Awake, alert.    SPEECH AND LANGUAGE:  Mute.  Can demonstrate function of objects when asked.  Follows most commands, has some difficulty with complex commands.    FUNDOSCOPIC:  No papilledema    CRANIAL NERVES:  II-Vision present, has right hemanopsia    III/IV/VI--EOMs are present in all directions.  Pupils are symmetrically reactive in dim light.  Minor right ptosis.    V--Normal facial sensation.    VII--Right facial asymmetry involving lips, right eyelid does not close fully, and slight involvement of forehead.    VIII--Hearing present to voice bilaterally.    IX/X--Symmetric soft palate rise.    XI--Right trapezius is plegic, left has normal power .    XII--Tongue protrudes without deviation.    MOTOR:  Spastic right hemiplegia.  Partial contractures in elbow, wrist, and finger flexion, but I can fully extend the right arm at the elbow and the fingers.  No contractures in the right leg.  Normal power, tone, and bulk in the left arm and leg.    SENSORY:  Present pin sensation in both arms and both legs, reduced in the right leg.    COORDINATION:  Normal finger-to-nose and heel-to-shin testing in the left arm and leg, unable in the right limbs because of plegia.    REFLEXES are brisker on the right limbs than the left.  The plantar responses are flexor.    GAIT deferred because of hemiplegia.        Bean Vu Jr., M.D., FAAN

## 2024-03-15 NOTE — ASSESSMENT & PLAN NOTE
Symptoms improving with Sertraline.  Increasing slowly what he can do physically for himself.  Continue at current dosage and follow up at least annually.

## 2024-03-15 NOTE — PROGRESS NOTES
"Subjective   Patient ID: New Pierre is a 68 y.o. male who presents for Follow-up.  He is accompanied by his wife today.    HPI    HYPERLIPIDEMIA:  Patient is tolerating regular Atorvastatin dosing without muscle achiness or weakness.  Review of last fasting lipids with good control.  Lab Results   Component Value Date    CHOL 128 11/13/2023     Lab Results   Component Value Date    HDL 35.9 11/13/2023     Lab Results   Component Value Date    LDLCALC 63 11/13/2023     Lab Results   Component Value Date    TRIG 148 11/13/2023     No components found for: \"CHOLHDL\"       HTN: BP controlled today  Denies CP, SOB, Edema, palpitations or headache.     S/P CVA: followed by home care  Home PT/OT - OT released him from their service  Able to stand and pivot with an aid - cannot stand alone without assistance  Cannot get up in a chair on his own    PT still coming to the [facility]  Has appointment with Dr. Vu    He is going to a day center every Monday for the day  He has been participating and enjoying it    DEPRESSION: stable on Sertraline  Sleeping better - had run out of his Mirtazapine and did sleep well without it.  Noticing that when he does take it he is groggy the next day making it more difficulty for him to do his PT exercises and help with self-care with his aid.    Review of Systems    Review of Systems negative except as noted in HPI and Chief complaint.     Objective     /60 (BP Location: Left arm, Patient Position: Sitting, BP Cuff Size: Adult)   Pulse 68   SpO2 98%      Physical Exam  Constitutional:       General: He is not in acute distress.     Appearance: Normal appearance. He is not ill-appearing.   HENT:      Head: Normocephalic and atraumatic.   Neck:      Vascular: No carotid bruit.   Cardiovascular:      Rate and Rhythm: Normal rate and regular rhythm.      Pulses: Normal pulses.      Heart sounds: Normal heart sounds. No murmur heard.     No gallop. "   Pulmonary:      Effort: Pulmonary effort is normal.      Breath sounds: Normal breath sounds. No wheezing, rhonchi or rales.   Musculoskeletal:      Cervical back: Normal range of motion and neck supple. No rigidity or tenderness.      Comments: Transported in wheelchair today.    Using left hand to use tablet for communication - unable to verbalize today.   Lymphadenopathy:      Cervical: No cervical adenopathy.   Skin:     General: Skin is warm and dry.   Neurological:      Mental Status: He is alert.      Comments: Cooperative with exam and questions  Non-verbal due to f/o tracheostomy.   Psychiatric:         Mood and Affect: Mood normal.         Assessment/Plan   Problem List Items Addressed This Visit       Ischemic cerebrovascular accident (CVA) (CMS/HCC)     Symptoms with mild improvement.  Continue with OT and PT.         Insomnia, unspecified    Relevant Medications    QUEtiapine (SEROquel) 25 mg tablet    Primary hypertension - Primary    Relevant Orders    Basic metabolic panel    Mixed hyperlipidemia    Relevant Medications    atorvastatin (Lipitor) 40 mg tablet    Hyperglycemia, unspecified    Hemiplegia and hemiparesis following cerebral infarction affecting right dominant side (CMS/HCC)     Homecare and PT to continue.  Encouraging continued home OT and possible speech therapy as well with a different provider.         Dysphagia following cerebral infarction    Relevant Medications    famotidine (Pepcid) 20 mg tablet    Major depressive disorder in partial remission (CMS/HCC)     Symptoms improving with Sertraline.  Increasing slowly what he can do physically for himself.  Continue at current dosage and follow up at least annually.          Other Visit Diagnoses       Agitated depression (CMS/HCC)        Depression with anxiety        Relevant Medications    sertraline (Zoloft) 100 mg tablet    Rosacea        Relevant Medications    metroNIDAZOLE (Metrogel) 0.75 % gel    Elevated glucose         Relevant Orders    Hemoglobin A1C    Basic metabolic panel             Patient was identified as a fall risk. Risk prevention instructions provided.    FOLLOW UP 3-4 MONTHS, SOONER PENDING RECOMMENDATIONS FROM SPECIALISTS.

## 2024-03-15 NOTE — LETTER
March 15, 2024     Gillian Foreman DO  [address]  [place] [place] [address]    Patient: New Pierre   YOB: 1955   Date of Visit: 3/15/2024       Dear Dr. Gillian Foreman DO:    Thank you for referring New Pierre to me for neurological evaluation. Below are my notes for this consultation.  If you have questions, please do not hesitate to call me.       Sincerely,     Bean Vu Jr., M.D., FAAN     ______________________________________________________________________________________    NEUROLOGY OUTPATIENT INITIAL VISIT NOTE    Assessment/Plan  Diagnoses and all orders for this visit:  Aphasia following cerebral infarction  -     Referral to Speech Therapy; Future  Hemiplegia and hemiparesis following cerebral infarction affecting right dominant side (CMS/HCC)      IMPRESSION:  Residual mixed (though mostly expressive) aphasia and right hemiplegia from large LMCA-distribution infarct with hemorrhagic conversion.    PLAN:  I advised the patient and his wife about his prognosis, which is that he will not fully recover, given the duration of his symptoms.  However, he will likely have some improvement over the next few months.  I did request Speech Therapy evaluation specifically with neurological rehab in mind.  He will continue ASA and atorvastatin for stroke prophylaxis.  I will see him one more time, in six months or prn, for progress.        Bean Vu Jr., M.D., FAAN     --------      Subjective    New Pierre is a 68 y.o. year old, right-handed male referred by Dr. Gillian Foreman for stroke hospital follow-up.  The patient is unable to provide useful history, but his wife accompanies him, and I reviewed the available [facility] and  EMR.    HPI  On 6/8/2023, the patient was found on the ground at work by a co-worker, unable to speak properly and with right facial weakness and hemiparesis.  EMS was summoned and he presented to the [facility]  "General ED at 1000.  He was given given TNK and also had a thrombectomy and LICA stenting for acute occlusion.  The next day, he had clinical worsening and was found to have bilateral subarachnoid hemorrhage.  TNK was reversed.  He initially required intubation, but was extubated on 6/10, then reintubated because of difficulty with secretion control.  In the meantime, he was found to have a right ankle fracture, presumably related to falling at the onset of his stroke.  Trach and PEG were placed on 6/17.  He was discharged to an LTAC on 6/21.  While there, he was treated for pneumonia but ultimately recovered and awakened.  He was ultimately discharged to a rehab and the PEG and trach were removed last fall.    He was taken home by his wife in October and his wife, along with aides, have been caring for the patient.  PT, OT, and Speech were started and eventually discontinued.    Can sit at bed edge on his own  Can sit up from bed with some help  Can stand an pivot with assist of 1.  Can answer questions \"yes-no\" with his iPad, or by shaking his head.    His wife is requesting Speech Therapy services.    He has been on ASA and atorvastatin for stroke prophylaxis.    Review of Systems   Unable to perform ROS: Patient nonverbal   All other systems reviewed and are negative.      No past medical history on file. Denies  PSH:  PEG/Trach    Social History     Tobacco Use   • Smoking status: Never   • Smokeless tobacco: Never   Substance Use Topics   • Alcohol use: Never     family history is not on file.    Current Outpatient Medications:   •  aspirin 325 mg tablet, Take 1 tablet (325 mg) by mouth once daily., Disp: , Rfl:   •  atorvastatin (Lipitor) 40 mg tablet, Take 1 tablet (40 mg) by mouth once daily., Disp: 90 tablet, Rfl: 1  •  famotidine (Pepcid) 20 mg tablet, Take 1 tablet (20 mg) by mouth 2 times a day., Disp: 180 tablet, Rfl: 1  •  metroNIDAZOLE (Metrogel) 0.75 % gel, Apply topically 2 times a day. Apply twice " daily to rosacea, Disp: 45 g, Rfl: 2  •  mirtazapine (Remeron) 15 mg tablet, Take 1 tablet (15 mg) by mouth once daily at bedtime., Disp: 90 tablet, Rfl: 0  •  QUEtiapine (SEROquel) 25 mg tablet, Take 1 tablet (25 mg) by mouth once daily at bedtime., Disp: 90 tablet, Rfl: 1  •  sertraline (Zoloft) 100 mg tablet, Take 1 tablet (100 mg) by mouth once daily., Disp: 100 tablet, Rfl: 1  Allergies   Allergen Reactions   • Chlorhexidine Unknown       Objective    /64   Pulse 73   Temp 36.3 °C (97.3 °F)     CONSTITUTIONAL:  No acute distress    CARDIOVASCULAR:  Normal pulses in the distal legs, mild edema of the right leg, but no edema of either arm or the left leg.  No carotid bruits.    MENTAL STATUS:  Awake, alert.    SPEECH AND LANGUAGE:  Mute.  Can demonstrate function of objects when asked.  Follows most commands, has some difficulty with complex commands.    FUNDOSCOPIC:  No papilledema    CRANIAL NERVES:  II-Vision present, has right hemanopsia    III/IV/VI--EOMs are present in all directions.  Pupils are symmetrically reactive in dim light.  Minor right ptosis.    V--Normal facial sensation.    VII--Right facial asymmetry involving lips, right eyelid does not close fully, and slight involvement of forehead.    VIII--Hearing present to voice bilaterally.    IX/X--Symmetric soft palate rise.    XI--Right trapezius is plegic, left has normal power .    XII--Tongue protrudes without deviation.    MOTOR:  Spastic right hemiplegia.  Partial contractures in elbow, wrist, and finger flexion, but I can fully extend the right arm at the elbow and the fingers.  No contractures in the right leg.  Normal power, tone, and bulk in the left arm and leg.    SENSORY:  Present pin sensation in both arms and both legs, reduced in the right leg.    COORDINATION:  Normal finger-to-nose and heel-to-shin testing in the left arm and leg, unable in the right limbs because of plegia.    REFLEXES are brisker on the right limbs than  the left.  The plantar responses are flexor.    GAIT deferred because of hemiplegia.        Bean Vu Jr., M.D., FAAN

## 2024-03-15 NOTE — ASSESSMENT & PLAN NOTE
Homecare and PT to continue.  Encouraging continued home OT and possible speech therapy as well with a different provider.

## 2024-03-18 ENCOUNTER — TELEPHONE (OUTPATIENT)
Dept: PRIMARY CARE | Facility: CLINIC | Age: 69
End: 2024-03-18

## 2024-03-18 NOTE — TELEPHONE ENCOUNTER
Patient is needing to get an RX through [facility] for a Right Knee Ankle Foot Orthosis, DX CODE: CVA      They need office notes and demographic sheet that I will send with RX for this.      Shaheen- PHONE: [phone]  FAX: [phone]

## 2024-04-08 ENCOUNTER — EVALUATION (OUTPATIENT)
Dept: SPEECH THERAPY | Facility: CLINIC | Age: 69
End: 2024-04-08
Payer: COMMERCIAL

## 2024-04-08 DIAGNOSIS — I69.320 APHASIA FOLLOWING CEREBRAL INFARCTION: ICD-10-CM

## 2024-04-08 DIAGNOSIS — I69.390 APRAXIA FOLLOWING CEREBROVASCULAR ACCIDENT: ICD-10-CM

## 2024-04-08 PROCEDURE — 92523 SPEECH SOUND LANG COMPREHEN: CPT | Mod: GN

## 2024-04-08 ASSESSMENT — PAIN SCALES - GENERAL: PAINLEVEL_OUTOF10: 0 - NO PAIN

## 2024-04-08 ASSESSMENT — PAIN - FUNCTIONAL ASSESSMENT: PAIN_FUNCTIONAL_ASSESSMENT: 0-10

## 2024-04-08 NOTE — PROGRESS NOTES
Speech-Language Pathology    SLP Adult Outpatient Speech-Language Cognitive Evaluation    Patient Name: New Pierre  MRN: [id number]  Today's Date: 4/8/2024   Time Calculation  Start Time: 0800  Stop Time: 0900  Time Calculation (min): 60 min      Current Problem:   1. Apraxia following cerebrovascular accident  Follow Up In Speech Therapy      2. Aphasia following cerebral infarction  Referral to Speech Therapy    Follow Up In Speech Therapy          SLP Assessment:  SLP Assessment Results:    This date, patient presented with verbal apraxia and mixed receptive/expressive aphasia. Patient is recommended for outpatient Speech Therapy services targeting motor speech and language rehabilitation, compensatory strategy/tool training (e.g. AAC), communication partner training, and establishment of a home program. Patient/spouse were in agreement with recommendations and goals/POC.    Prognosis: Good  Treatment Provided: No  Strengths: Motivation, family support  Barriers: Late effects of CVA, severity of deficits        SLP Plan:  Inpatient/Swing Bed or Outpatient: Outpatient  SLP Plan: Skilled SLP  SLP Frequency: 1-2x/week  Duration: 12+ weeks  Discussed POC: Patient  Discussed Risks/Benefits: Yes  Patient/Caregiver Agreeable: Yes    GOALS: Start date: 4/8/2024 ; End/re-eval date: 7/1/2024  By discharge:  LTG: Patient will comprehend communication and express information for basic wants/needs/ideas using total communication in order to increased participation in the home and community environments.    STGs:   1. Patient will perform auditory comprehension tasks (e.g. yes/no questions, etc.) using total communication with 90% accuracy given mild cueing.  2. Patient will imitate oral movements for basic sound combinations (VC, CV, etc.) with or without vocal production in 9/10 opportunities given 1:1 verbal and visual models.  3. Patient will participate in further evaluation and exploration of reading comprehension and  written expression with new goals to be added to POC as appropriate.        General Visit Information:  Reason for Referral:    Patient was referred for outpatient Speech Therapy evaluation 2/2 receptive and expression aphasia and verbal apraxia following large L MCA CVA with hemorrhagic conversion 6/8/2023. He was initially trach'd and PEG'd, however, both have been removed with cont'd recovery. Patient attended today's assessment accompanied by his wife who provided his recent medical history as patient was unable to do so d/t communication deficits. Following CVA, patient was seen for ST at different levels of care including [facility], SNF, and most recently, [facility]. Patient's wife reported an undesirable experience with [facility] ST who made suggestions that did not fit with the patient's wants/needs. From [facility], patient was fitted with a Lingraphica SGD that he has had since 12/2023. They have performed personalization of the device for patient's wants/needs/interests. Patient has some difficulty with independent use of the device. Patient's wife reported the following, persistent deficits:    > SPEECH/VOICE: Verbal apraxia affecting motor speech and voice production; wife recently reported patient's reflexive vocalizations are becoming more frequent (vocal production with laughing, spontaneously vocalized a vowel, etc)  > LANGUAGE: Difficulty with auditory comprehension (reliability of yes/no responses, etc.); somewhat preserved reading comprehension skills (wife sends patient emails daily, and she recognizes he is able to catch humor in her messages); unknown degree of expressive aphasia given the severity of patient's apraxia and l/o vocal abilities    In addition to SGD, patient has a Royce on which he has [employer] language apps and other cognitive apps (puzzles, etc.). He works with his wife and aide on various speech/language tasks daily. Patient recently began attending a day program in [place] ([employer]) every  Monday.    *Social Hx: , lives with wife (Zabrina); has a dog that is very important to him (Annetteo)    Referred By: Dr. Bean Vu  Past Medical History Relevant to Rehab: L MCA CVA, dysphagia with PEG, R hemiplegia, MDD, insomnia, HTN, HLD  Number of Authorized Treatments : Based on MN ([place])  Total Number of Visits : 1        Objective     Pain:  Pain Assessment: 0-10  Pain Score: 0 - No pain      Oral Mechanism Exam / Cranial Nerve Exam:  Trigeminal Nerve (V)     Jaw ROM: WFL  Facial Nerve (VII)     Asymmetry at rest: Right sided facial weakness     Lip pucker: Reduced (R)     Smile: Reduced (R)     Lip/smile alternation: DNT  Glossopharyngeal, Vagus (IX, X)     Uvula at rest: WFL     Palate at rest: WFL     Palatal elevation /a/: CNT  Hypoglossal (XII)     Tongue at rest: WFL     Tongue protrusion: R deviation     Side to side: Reduced (R)     Tongue elevation: Reduced      [facility] Diagnostic Aphasia Evaluation (BDAE) Short Form:  --Word Comprehension (auditory): 8/14 (57%)      Other:  --Motor Speech (Oral Imitation): With 1:1 verbal and visual modeling, patient was able to imitate /m/ and /l/ initial CV combinations  --Yes/No Questions (Total Communication: head nod/shake, yes/no via SGD): 4/6  --Following Commands (1-Step): 0/2        Outpatient Education:  Individual(s) Educated: Patient, Spouse  Verbal Education : Results of evaluation; treatment recommendations  Risk and Benefits Discussed with Patient/Caregiver/Other: yes  Patient/Caregiver Demonstrated Understanding: yes  Plan of Care Discussed and Agreed Upon: yes  Patient Response to Education: Patient/Caregiver Verbalized Understanding of Information, Patient/Caregiver Asked Appropriate Questions

## 2024-04-12 ENCOUNTER — PATIENT OUTREACH (OUTPATIENT)
Dept: PRIMARY CARE | Facility: CLINIC | Age: 69
End: 2024-04-12
Payer: COMMERCIAL

## 2024-04-12 DIAGNOSIS — M62.81 MUSCLE WEAKNESS (GENERALIZED): ICD-10-CM

## 2024-04-12 DIAGNOSIS — I69.351 HEMIPLEGIA AND HEMIPARESIS FOLLOWING CEREBRAL INFARCTION AFFECTING RIGHT DOMINANT SIDE (MULTI): ICD-10-CM

## 2024-04-12 DIAGNOSIS — I63.132 CEREBRAL INFARCTION DUE TO EMBOLISM OF LEFT CAROTID ARTERY (MULTI): ICD-10-CM

## 2024-04-12 PROCEDURE — 99490 CHRNC CARE MGMT STAFF 1ST 20: CPT | Performed by: FAMILY MEDICINE

## 2024-04-12 NOTE — PROGRESS NOTES
Received phone call from Shaheen with [facility] regarding KAFO brace for right knee ankle foot orthosis. Shaheen reports that patient's wife stated [facility] does not have order from his PCP. Noted in chart that script, demographics and chart notes faxed on 3/18/24. Shaheen called [facility] to verify, have not received fax.  Company is not able to secure email and prefers fax.    Confirmed fax number of [facility] [phone]    CM to follow-up.

## 2024-04-18 ENCOUNTER — TREATMENT (OUTPATIENT)
Dept: SPEECH THERAPY | Facility: CLINIC | Age: 69
End: 2024-04-18
Payer: COMMERCIAL

## 2024-04-18 DIAGNOSIS — I69.320 APHASIA FOLLOWING CEREBRAL INFARCTION: ICD-10-CM

## 2024-04-18 DIAGNOSIS — I69.390 APRAXIA FOLLOWING CEREBROVASCULAR ACCIDENT: ICD-10-CM

## 2024-04-18 PROCEDURE — 92507 TX SP LANG VOICE COMM INDIV: CPT | Mod: GN

## 2024-04-18 ASSESSMENT — PAIN SCALES - GENERAL: PAINLEVEL_OUTOF10: 0 - NO PAIN

## 2024-04-18 ASSESSMENT — PAIN - FUNCTIONAL ASSESSMENT: PAIN_FUNCTIONAL_ASSESSMENT: 0-10

## 2024-04-18 NOTE — PROGRESS NOTES
"Speech-Language Pathology    SLP Adult Outpatient Speech-Language Pathology Treatment     Patient Name: New Pierre  MRN: [id number]  Today's Date: 4/18/2024  Time Calculation  Start Time: 1035  Stop Time: 1120  Time Calculation (min): 45 min      Current Problem:   1. Aphasia following cerebral infarction  Follow Up In Speech Therapy      2. Apraxia following cerebrovascular accident  Follow Up In Speech Therapy            SLP Assessment:  SLP TX Intervention Outcome: Making Progress Towards Goals  Treatment Provided: Yes, see Objective for details  Treatment Tolerance: Patient tolerated treatment well      Plan:  SLP TX Plan: Continue Plan of Care  SLP Frequency: 1x per week  Discussed POC: Patient  Discussed Risks/Benefits: Yes, Patient  Patient/Caregiver Agreeable: Yes      Subjective   Patient well, arriving to and attending treatment accompanied by his wife. This is patient's first F/U visit since his evaluation on 4/8. Patient brought his Lingraphica device to today's session. The day after his last visit, patient's wife reported that he spontaneously verbalized \"yes\" x1 and \"no\" x2! This is the first time that patient has ever vocalized words since the CVA.       General Visit Information:  Number of Authorized Treatments : Based on [place] ([place])  Total Number of Visits : 2      Pain Assessment:  Pain Assessment: 0-10  Pain Score: 0 - No pain      Objective     GOALS: Start date: 4/8/2024 ; End/re-eval date: 7/1/2024  By discharge:  LTG: Patient will comprehend communication and express information for basic wants/needs/ideas using total communication in order to increased participation in the home and community environments.    STGs:   1. Patient will perform auditory comprehension tasks (e.g. yes/no questions, etc.) using total communication with 90% accuracy given mild cueing.  > NOT ADDRESSED.    2. Patient will imitate oral movements for basic sound combinations (VC, CV, etc.) with or without vocal " production in 9/10 opportunities given 1:1 verbal and visual models.  > ADDRESSED: Tongue elevation and placement at alveolar ridge practiced for /l, n, t, d, s/ with 1:1 verbal and visual cueing/modeling. Accuracy and speed of movement markedly improved as the activity and session progressed. Increased difficulty evident with addition if vowel sounds for VC and CV combinations.    3. Patient will participate in further evaluation and exploration of reading comprehension and written expression with new goals to be added to POC as appropriate.  > ADDRESSED: Informal evaluation of reading comprehension initiated. Patient matched pictures/words with 60% accuracy (6/10), matched oral words to written words with 100% accuracy (10/10), and completed active, single sentences with 0% accuracy (0/3). Continue with evaluation in future visits as appropriate.      Outpatient Education:  Adult Outpatient Education  Verbal Education : Review of evaluation; HEP  Risk and Benefits Discussed with Patient/Caregiver/Other: yes  Patient/Caregiver Demonstrated Understanding: yes  Plan of Care Discussed and Agreed Upon: yes  Patient Response to Education: Patient/Caregiver Verbalized Understanding of Information, Patient/Caregiver Asked Appropriate Questions

## 2024-04-19 ENCOUNTER — TELEPHONE (OUTPATIENT)
Dept: PRIMARY CARE | Facility: CLINIC | Age: 69
End: 2024-04-19

## 2024-04-19 ENCOUNTER — TREATMENT (OUTPATIENT)
Dept: SPEECH THERAPY | Facility: CLINIC | Age: 69
End: 2024-04-19
Payer: COMMERCIAL

## 2024-04-19 DIAGNOSIS — I63.132 CEREBRAL INFARCTION DUE TO EMBOLISM OF LEFT CAROTID ARTERY (MULTI): Primary | ICD-10-CM

## 2024-04-19 DIAGNOSIS — I69.320 APHASIA FOLLOWING CEREBRAL INFARCTION: ICD-10-CM

## 2024-04-19 DIAGNOSIS — I69.390 APRAXIA FOLLOWING CEREBROVASCULAR ACCIDENT: ICD-10-CM

## 2024-04-19 PROCEDURE — 92507 TX SP LANG VOICE COMM INDIV: CPT | Mod: GN

## 2024-04-19 NOTE — PROGRESS NOTES
Patient's wife request referral for outpatient physical therapy. Patient recently completed home health PT.

## 2024-04-19 NOTE — PROGRESS NOTES
"Speech-Language Pathology    SLP Adult Outpatient Speech-Language Pathology Treatment     Patient Name: New Pierre  MRN: [id number]  Today's Date: 4/19/2024  Time Calculation  Start Time: 0145  Stop Time: 0230  Time Calculation (min): 45 min      Current Problem:   1. Apraxia following cerebrovascular accident  Follow Up In Speech Therapy      2. Aphasia following cerebral infarction  Follow Up In Speech Therapy          SLP Assessment:  SLP TX Intervention Outcome: Making Progress Towards Goals  Treatment Provided: Yes, see Objective for details  Treatment Tolerance: Patient tolerated treatment well      Plan:  SLP TX Plan: Continue Plan of Care  SLP Frequency: 1x per week  Discussed POC: Patient  Discussed Risks/Benefits: Yes, Patient  Patient/Caregiver Agreeable: Yes      Subjective   Patient well, arriving to treatment accompanied by his wife and attending the session independently. Patient brought his Lingraphica device to today's session. Patient's wife reported that he spontaneously verbalized \"ouch!\" yesterday.       General Visit Information:  Number of Authorized Treatments : Based on [place] ([place])  Total Number of Visits : 3      Pain Assessment:  Pain Assessment: 0-10  Pain Score: 0 - No pain      Objective     GOALS: Start date: 4/8/2024 ; End/re-eval date: 7/1/2024  By discharge:  LTG: Patient will comprehend communication and express information for basic wants/needs/ideas using total communication in order to increased participation in the home and community environments.    STGs:   1. Patient will perform auditory comprehension tasks (e.g. yes/no questions, etc.) using total communication with 90% accuracy given mild cueing.  > ADDRESSED: Combined auditory and reading comprehension task addressed today. Patient was most reliable with responses when entering them on his SGD (80%). When replying via head nod/shake or thumbs up/down, patient was 50% accurate. Patient benefited from 2-3 " repetitions/question for increased accuracy of comprehension.    2. Patient will imitate oral movements for basic sound combinations (VC, CV, etc.) with or without vocal production in 9/10 opportunities given 1:1 verbal and visual models.  > ADDRESSED: Tongue elevation and placement at alveolar ridge practiced for /l, n, t, d, s/ with 1:1 verbal and visual cueing/modeling. Accuracy and speed of movement markedly improved since yesterday. Patient imitated vowels /e, i, ai, o, u/ with difficulty most evident in patient's oral positioning for /o/. He was able to achieve correct placement x1 when a mirror was introduced. Cont'd difficulty evident with addition of vowel sounds for VC and CV combinations (e.g. n -> o [no]).    3. Patient will participate in further evaluation and exploration of reading comprehension and written expression with new goals to be added to POC as appropriate.  > ADDRESSED: Informal evaluation of reading comprehension initiated. Patient matched pictures/words with 60% accuracy (6/10), matched oral words to written words with 100% accuracy (10/10), and completed active, single sentences with 0% accuracy (0/3). Continue with evaluation in future visits as appropriate.      Outpatient Education:  Adult Outpatient Education  Verbal Education : Summary of session performance; HEP  Risk and Benefits Discussed with Patient/Caregiver/Other: yes  Patient/Caregiver Demonstrated Understanding: yes  Plan of Care Discussed and Agreed Upon: yes  Patient Response to Education: Patient/Caregiver Verbalized Understanding of Information, Patient/Caregiver Asked Appropriate Questions

## 2024-04-19 NOTE — CARE PLAN
Reviewed chart prior to [facility] patient outreach. Spoke with patient's spouse Zabrina. Patient is unable to communicate verbally on phone.  His wife works from home and provides patient's care. Home health aides to assist with patient's care twice daily. Patient is now going to adult day care on Mondays and has showers at facility, and his wife goes to the office on Mondays. She keeps his mind active with [employer].     They met with [facility], the agency is currently looking for home health aides for [facility]. They are currently paying out of pocket for home health aides through two different agencies to get adequate coverage. Patient is able to stand and pivot. Patient needs a brace to support his weaker leg. Prescription written and sent by patient's PCP office on 3/18/24. Shaheen with [facility] previously reached out to  due to [facility] not receiving requested prescription and documentation. CM refaxed to confirmed fax number [phone]. Patient has completed his home physical therapy. His wife requested prescription for outpatient physical therapy to further his progress.      Patient was previously having home speech therapy and is able to say a couple of words. He is still talking mainly from his throat, reported that home speech therapy stated that he had progressed as far as she was able to get him. Patient recently started outpatient speech therapy through . His wife was very impressed with the appointment and that there was hope for patient to learn to speak again. She reported that the outpatient speech therapist stated that he is capable and is making the sounds in his throat he needs to make, will work with him on re-learning how to move his mouth to form the words. Zabrina stated that patient said the words yes and no at home after his outpatient appointment.     No other questions or concerns identified at this time. Patient's wife will reach out as needed with any non-urgent  questions or concerns.

## 2024-04-23 ENCOUNTER — APPOINTMENT (OUTPATIENT)
Dept: SPEECH THERAPY | Facility: CLINIC | Age: 69
End: 2024-04-23
Payer: COMMERCIAL

## 2024-04-23 ENCOUNTER — DOCUMENTATION (OUTPATIENT)
Dept: SPEECH THERAPY | Facility: CLINIC | Age: 69
End: 2024-04-23

## 2024-04-25 ENCOUNTER — DOCUMENTATION (OUTPATIENT)
Dept: PRIMARY CARE | Facility: CLINIC | Age: 69
End: 2024-04-25

## 2024-04-25 NOTE — PROGRESS NOTES
Discharge facility: [facility]  Discharge diagnosis:   Altered mental status, transient spell-possibly vasovagal vs seizure  Previous stroke with right sided hemiplegia/aphasia  Pyuria  HTN  GERD  DM2  Hyperlipidemia  Obesity  Admission date: 4/22/24  Discharge date:  4/23/24    PCP Appointment Date: 5/13/24    See  Hospital Encounter and Summary, and Discharge assessment below for further details. Information obtained from discharge summary from EMR.     HOSPITAL COURSE:  68 year old presented with transient alteration of consciousness/gazing and shaking of limbs. He was observed on telemetry and was seen by Neurology and underwent an EEG. He had PT/OT/SLP.   SIGNIFICANT DIAGNOSTIC STUDIES:  CT head, EEG   CONSULTANTS:  Neurology  RECOMMENDED NEXT STEPS:   Discharge home with [facility]. Follow up with PCP and Neurology as an outpatient.      Medications  Medications reviewed with patient/caregiver?: Not applicable (4/25/2024 12:56 PM)  Is the patient having any side effects they believe may be caused by any medication additions or changes?: No (4/25/2024 12:56 PM)  Does the patient have all medications ordered at discharge?: Not applicable (4/25/2024 12:56 PM)  Care Management Interventions: No intervention needed (4/25/2024 12:56 PM)  Is the patient taking all medications as directed (includes completed medication regime)?: Yes (4/25/2024 12:56 PM)    Appointments  Does the patient have a primary care provider?: Yes (4/25/2024 12:56 PM)  Care Management Interventions: Verified appointment date/time/provider (4/25/2024 12:56 PM)  Has the patient kept scheduled appointments due by today?: Yes (4/25/2024 12:56 PM)    Patient Teaching  Does the patient have access to their discharge instructions?: Yes (4/25/2024 12:56 PM)  Care Management Interventions: Reviewed instructions with patient (4/25/2024 12:56 PM)  What is the patient's perception of their health status since discharge?: Improving (4/25/2024 12:56 PM)  Is the  "patient/caregiver able to teach back the hierarchy of who to call/visit for symptoms/problems? PCP, Specialist, Home Health nurse, Urgent Care, ED, 911: Yes (4/25/2024 12:56 PM)      Spoke with patient's wife/care giver. Patient was at care facility for respite/ day care where he has a shower. Patient had gazing and shaking of his limbs while in the shower. Wife states that patient is unable to spit, and when fluid gets caught in his throat he clinches his hands and his eyes can roll back. A CT and EEG where taken while he was in the hospital, and everything looked \"fine.\" No medication changes. Patient has outpatient therapy for speech therapy and an evaluation for physical therapy on 5/6/24.      "

## 2024-04-26 ENCOUNTER — TREATMENT (OUTPATIENT)
Dept: SPEECH THERAPY | Facility: CLINIC | Age: 69
End: 2024-04-26
Payer: COMMERCIAL

## 2024-04-26 DIAGNOSIS — I69.390 APRAXIA FOLLOWING CEREBROVASCULAR ACCIDENT: ICD-10-CM

## 2024-04-26 DIAGNOSIS — I69.320 APHASIA FOLLOWING CEREBRAL INFARCTION: ICD-10-CM

## 2024-04-26 PROCEDURE — 92507 TX SP LANG VOICE COMM INDIV: CPT | Mod: GN

## 2024-04-26 NOTE — PROGRESS NOTES
"Speech-Language Pathology    SLP Adult Outpatient Speech-Language Pathology Treatment     Patient Name: New Pierre  MRN: [id number]  Today's Date: 4/26/2024  Time Calculation  Start Time: 0145  Stop Time: 0230  Time Calculation (min): 45 min      Current Problem:   1. Apraxia following cerebrovascular accident  Follow Up In Speech Therapy      2. Aphasia following cerebral infarction  Follow Up In Speech Therapy          SLP Assessment:  SLP TX Intervention Outcome: Making Progress Towards Goals  Treatment Provided: Yes, see Objective for details  Treatment Tolerance: Patient tolerated treatment well      Plan:  SLP TX Plan: Continue Plan of Care  SLP Frequency: 1-2x per week  Discussed POC: Patient  Discussed Risks/Benefits: Yes, Patient  Patient/Caregiver Agreeable: Yes      Subjective   Patient well, arriving to treatment accompanied by his wife and attending the session independently. Patient brought his Lingraphica device to today's session. Since last visit, patient was hospitalized from 4/22-4/23 for suspected episode of vasovagal syncope. Wife reported no change in functioning with hospitalization. She also reported that patient spontaneously verbalized \"ouch\" and \"ooh\" since last session.        General Visit Information:  Number of Authorized Treatments : Based on MN (Shea)  Total Number of Visits : 4      Pain Assessment:  Pain Assessment: 0-10  Pain Score: 0 - No pain      Objective     GOALS: Start date: 4/8/2024 ; End/re-eval date: 7/1/2024  By discharge:  LTG: Patient will comprehend communication and express information for basic wants/needs/ideas using total communication in order to increased participation in the home and community environments.    STGs:   1. Patient will perform auditory comprehension tasks (e.g. yes/no questions, etc.) using total communication with 90% accuracy given mild cueing.  > NOT ADDRESSED. Informally.    2. Patient will imitate oral movements for basic sound " "combinations (VC, CV, etc.) with or without vocal production in 9/10 opportunities given 1:1 verbal and visual models.  > ADDRESSED: Tongue elevation and placement at alveolar ridge practiced for /l, n, t, d, s/ with 1:1 verbal and visual cueing/modeling. Accuracy and speed of movement markedly improved since last week with patient performing movement with 100% accuracy with fading verbal and visual cues. Patient successfully imitated vowels /ai, o, u/ with difficulty most evident in patient's oral positioning for /e, i/ today. He demo'd improved transition of articulators for production of CV word \"no\" given 1:1 verbal/visual model. Patient was asked yes/no questions where all answers were \"no\" in order to facilitate more spontaneous, practical production of word. Patient spontaneously mouthed \"no\" x1! No accompanying vocalization noted.    3. Patient will participate in further evaluation and exploration of reading comprehension and written expression with new goals to be added to POC as appropriate.  > ADDRESSED: Reading further addressed. Goal added to POC. See below. Writing not addressed.    4. Patient will perform reading comprehension tasks at the single word through single sentence levels using total communication with 80% accuracy given mild cueing.  > GOAL ADDED/ADDRESSED: Patient correctly matched words to corresponding categories from a F/O 3 category choices in 9/15 opportunities independently (60%). He correctly matched category items to their corresponding categories from a F/O category items in 11/15 opportunities independently (73%). These tasks also required verbal reasoning skills. Patient correctly completed sentences with the best fitting response from a F/O 3 choices in 4/11 opportunities independently (36%). Patient was unable to perform an odd-man-out task today with a F/O 3 words. HEP established for basic reading tasks.        Outpatient Education:  Adult Outpatient Education  Verbal " Education : Summary of session performance; HEP  Risk and Benefits Discussed with Patient/Caregiver/Other: yes  Patient/Caregiver Demonstrated Understanding: yes  Plan of Care Discussed and Agreed Upon: yes  Patient Response to Education: Patient/Caregiver Verbalized Understanding of Information, Patient/Caregiver Asked Appropriate Questions

## 2024-04-26 NOTE — PROGRESS NOTES
Speech-Language Pathology                 Therapy Communication Note    Patient Name: New Pierre  MRN: [id number]  Today's Date: 4/23/2024     Discipline: Speech Language Pathology    Missed Visit Reason: Patient's wife cancelled his scheduled Speech Therapy appointment this date 2/2 hospitalization.    Missed Time: Cancel

## 2024-04-29 ENCOUNTER — TREATMENT (OUTPATIENT)
Dept: SPEECH THERAPY | Facility: CLINIC | Age: 69
End: 2024-04-29
Payer: COMMERCIAL

## 2024-04-29 DIAGNOSIS — I69.390 APRAXIA FOLLOWING CEREBROVASCULAR ACCIDENT: Primary | ICD-10-CM

## 2024-04-29 DIAGNOSIS — I69.320 APHASIA FOLLOWING CEREBRAL INFARCTION: ICD-10-CM

## 2024-04-29 PROCEDURE — 92507 TX SP LANG VOICE COMM INDIV: CPT | Mod: GN

## 2024-04-29 ASSESSMENT — PAIN SCALES - GENERAL: PAINLEVEL_OUTOF10: 0 - NO PAIN

## 2024-04-29 ASSESSMENT — PAIN - FUNCTIONAL ASSESSMENT: PAIN_FUNCTIONAL_ASSESSMENT: 0-10

## 2024-04-29 NOTE — PROGRESS NOTES
Speech-Language Pathology    SLP Adult Outpatient Speech-Language Pathology Treatment     Patient Name: New Pierre  MRN: [id number]  Today's Date: 4/29/2024  Time Calculation  Start Time: 1115  Stop Time: 1200  Time Calculation (min): 45 min      Current Problem:   1. Apraxia following cerebrovascular accident  Follow Up In Speech Therapy      2. Aphasia following cerebral infarction  Follow Up In Speech Therapy          SLP Assessment:  SLP TX Intervention Outcome: Making Progress Towards Goals  Treatment Provided: Yes, see Objective for details  Treatment Tolerance: Patient tolerated treatment well      Plan:  SLP TX Plan: Continue Plan of Care  SLP Frequency: 1-2x per week  Discussed POC: Patient  Discussed Risks/Benefits: Yes, Patient  Patient/Caregiver Agreeable: Yes      Subjective   Patient well, arriving to treatment accompanied by his  and attending the session independently. Patient brought his Lingraphica device to today's session. Patient was unable to provide report beyond non-verbal head nod/shake. He had some difficulty conveying whether or not he had pain, and the pain scale on his SGD was used so that patient could express that he did not have pain today. He needed assistance locating this scale on his device.       General Visit Information:  Number of Authorized Treatments : Based on MN (Shea)  Total Number of Visits : 5      Pain Assessment:  Pain Assessment: 0-10  Pain Score: 0 - No pain      Objective     GOALS: Start date: 4/8/2024 ; End/re-eval date: 7/1/2024  By discharge:  LTG: Patient will comprehend communication and express information for basic wants/needs/ideas using total communication in order to increased participation in the home and community environments.    STGs:   1. Patient will perform auditory comprehension tasks (e.g. yes/no questions, etc.) using total communication with 90% accuracy given mild cueing.  > NOT ADDRESSED. Informally.    2. Patient will  "imitate oral movements for basic sound combinations (VC, CV, etc.) with or without vocal production in 9/10 opportunities given 1:1 verbal and visual models.  > ADDRESSED: Patient successfully imitated oral movements for vowels /I, ai, o, u, au/, and with max modeling and verbal cueing, patient performed VC and CV syllables \"on, no, own.\" On x1 occasion, patient noted to engage in oral movement for a speech sound/word, however, oral movement was indistinguishable as a word and no vocalization was heard. Oral movement was involuntary.    3. Patient will participate in further evaluation and exploration of reading comprehension and written expression with new goals to be added to POC as appropriate.  > NOT ADDRESSED. Writing not addressed.    4. Patient will perform reading comprehension tasks at the single word through single sentence levels using total communication with 80% accuracy given mild cueing.  > ADDRESSED: Patient correctly identified odd-man-out from a F/O 4 words in 9/15 opportunities independently (60%). Patient chose an incorrect response for x4/4 questions where the odd-man-out was the first word in the list (furthest to left). Patient correctly completed sentences with the best fitting response from a F/O 3 choices in 6/15 opportunities with therapist reading sentence prompt aloud (40%). HEP added to today.        Outpatient Education:  Adult Outpatient Education  Verbal Education : Summary of session performance; HEP  Risk and Benefits Discussed with Patient/Caregiver/Other: yes  Patient/Caregiver Demonstrated Understanding: yes  Plan of Care Discussed and Agreed Upon: yes  Patient Response to Education: Patient/Caregiver Verbalized Understanding of Information, Patient/Caregiver Asked Appropriate Questions  "

## 2024-05-02 ENCOUNTER — APPOINTMENT (OUTPATIENT)
Dept: SPEECH THERAPY | Facility: CLINIC | Age: 69
End: 2024-05-02
Payer: COMMERCIAL

## 2024-05-02 ENCOUNTER — DOCUMENTATION (OUTPATIENT)
Dept: SPEECH THERAPY | Facility: CLINIC | Age: 69
End: 2024-05-02
Payer: COMMERCIAL

## 2024-05-02 NOTE — PROGRESS NOTES
Speech-Language Pathology                 Therapy Communication Note    Patient Name: New Pierre  MRN: [id number]  Today's Date: 5/2/2024     Discipline: Speech Language Pathology    Missed Visit Reason: Patient's wife cancelled his scheduled Speech Therapy appointment this date 2/2 l/o transportation.    Missed Time: Cancel

## 2024-05-06 ENCOUNTER — EVALUATION (OUTPATIENT)
Dept: PHYSICAL THERAPY | Facility: CLINIC | Age: 69
End: 2024-05-06
Payer: COMMERCIAL

## 2024-05-06 DIAGNOSIS — Z74.09 IMPAIRED MOBILITY: Primary | ICD-10-CM

## 2024-05-06 DIAGNOSIS — I63.132 CEREBRAL INFARCTION DUE TO EMBOLISM OF LEFT CAROTID ARTERY (MULTI): ICD-10-CM

## 2024-05-06 DIAGNOSIS — R29.898 WEAKNESS OF RIGHT LOWER EXTREMITY: ICD-10-CM

## 2024-05-06 PROCEDURE — 97162 PT EVAL MOD COMPLEX 30 MIN: CPT | Mod: GP

## 2024-05-06 PROCEDURE — 97530 THERAPEUTIC ACTIVITIES: CPT | Mod: GP

## 2024-05-06 ASSESSMENT — ENCOUNTER SYMPTOMS
OCCASIONAL FEELINGS OF UNSTEADINESS: 1
LOSS OF SENSATION IN FEET: 0
DEPRESSION: 0

## 2024-05-06 ASSESSMENT — BALANCE ASSESSMENTS
STANDING UNSUPPORTED: UNABLE TO STAND 30 SECONDS UNSUPPORTED
STANDING TO SITTING: NEEDS ASSIST TO SIT
PLACE ALTERNATE FOOT ON STEP OR STOOL WHILE STANDING UNSUPPORTED: NEEDS ASSIST TO KEEP FROM LOSING BALANCE OR FALLING
SITTING WITH BACK UNSUPPORTED BUT FEET SUPPORTED ON FLOOR OR ON A STOOL: ABLE TO SIT 30 SECONDS
PLACE ALTERNATE FOOT ON STEP OR STOOL WHILE STANDING UNSUPPORTED: NEEDS ASSISTANCE TO KEEP FROM FALLING/UNABLE TO TRY
STANDING TO SITTING: NEEDS MODERATE OR MAXIMAL ASSIST TO STAND
STANDING ON ONE LEG: UNABLE TO TRY NEEDS ASSIST TO PREVENT FALL
STANDING UNSUPPORTED ONE FOOT IN FRONT: LOSES BALANCE WHILE STEPPING OR STANDING
STANDING UNSUPPORTED WITH FEET TOGETHER: NEEDS HELP TO ATTAIN POSITION AND UNABLE TO HOLD FOR 15 SECONDS
STANDING UNSUPPORTED WITH EYES CLOSED: NEEDS HELP TO KEEP FROM FALLING
TRANSFERS: NEEDS ONE PERSON TO ASSIST
TURN 360 DEGREES: NEEDS ASSISTANCE WHILE TURNING
REACHING FORWARD WITH OUTSTRETCHED ARM WHILE STANDING: LOSES BALANCE WHILE TRYING/REQUIRES EXTERNAL SUPPORT
PICK UP OBJECT FROM THE FLOOR FROM A STANDING POSITION: UNABLE TO TRY/NEEDS ASSIST TO KEEP FROM LOSING BALANCE OR FALLING
LONG VERSION TOTAL SCORE (MAX 56): 3

## 2024-05-06 ASSESSMENT — PAIN SCALES - GENERAL: PAINLEVEL_OUTOF10: 0 - NO PAIN

## 2024-05-06 ASSESSMENT — PAIN - FUNCTIONAL ASSESSMENT: PAIN_FUNCTIONAL_ASSESSMENT: 0-10

## 2024-05-06 NOTE — PROGRESS NOTES
Physical Therapy    Physical Therapy Evaluation and Treatment    Patient Name: New Pierre  MRN: [id number]  Today's Date: 5/6/2024  Time Calculation  Start Time: 0800  Stop Time: 0900  Time Calculation (min): 60 min  PT Evaluation Time Entry  PT Evaluation (Moderate) Time Entry: 50  PT Therapeutic Procedures Time Entry  Therapeutic Activity Time Entry: 10        Insurance: [employer]    Plan of Care: 5/6/24 - 8/4/24  Visit Number: 1       Assessment:     New Pierre  is a 68 y.o. old patient who participated in a physical therapy evaluation today due to L MCA on 6/8/23 resulting in R sided hemiplegia, hemiparesis, and aphasia. Pt presents with signs and symptoms consistent with R sided hemiplegia, hemiparesis. his impairments include: [balance deficits, strength deficits, AROM deficits, and R LE motor control deficits.  Due to these impairments, he has the following functional limitations and participation restrictions: gait deviations, increased fall risk, stair negotiation, transfers, bed mobility, performing household/recreational activities, and performing some ADLS.  Skilled physical therapy services are appropriate and beneficial in order to achieve measurable and meaningful change in the objective tests and measures. Utilization of skilled physical therapy services will aid in advancing his functional status and attaining his therapy-related goals. The patient verbalized understanding and is in agreement with all goals and plan of care. Plan of care was developed with input and agreement by the patient    Plan:   OP PT Plan  Treatment/Interventions: Biofeedback, Cryotherapy, Dry needling, Education/ Instruction, Electrical stimulation, Gait training, Hot pack, Manual therapy, Neuromuscular re-education, Orthosis fit/ training, Prosthetic management/ training, Self care/ home management, Taping techniques, Therapeutic activities, Therapeutic exercises, W/C Management training  PT Plan: Skilled PT  PT  "Frequency: 2 times per week  Duration: 8 visits  Onset Date: 06/08/23  Certification Period Start Date: 05/06/24  Certification Period End Date: 08/04/24  Rehab Potential: Fair  Plan of Care Agreement: Patient, Other (comment) (Wife)    Current Problem:  1. Impaired mobility  Follow Up In Physical Therapy      2. 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 Weakness of right lower extremity  Follow Up In Physical Therapy            SUBJECTIVE  Subjective   Patient's wife present for session and provides history this date as pt is currently non-verbal.   New Pierre  is a 68 y.o. male  presenting to the clinic with chief complaint of s/p L MCA infarct on 6/8/23.   Patient's states that home health PT has not been doing much with patient and is here for PT care. Wife states that he has had an appointment with [facility] for a \"leg brace\", based on discussion most likely AFO brace.    SCREENING:  -Sleep: sleeps well  -Numbness/tingling: No  -Headache/dizziness: No    PREVIOUS LEVEL OF FUNCTION:  Independent Living, Independent ADLs/IADLs, Independent Ambulation, (+) Driving  CURRENT FUNCTIONAL LIMITATIONS: Needs assist for ADLs (dressing, washing, transfers), Uses bedside commode. Patient performs stand-pivot transfers w/c<>bed with assist. Patient able to perform bed mobility (rolling) and assist with supine<>sit.   CURRENT DME:  cane, manual w/c, hospital bed      SOCIAL HISTORY/LIVING ARRANGEMENT:   Patient lives with wife in a single family home with ramped entrance  Patient sleeps in hospital bed and uses bed side commode  Spends time in the main level of the family home    EXERCISE/ACTIVITY LEVEL:  sedentary    PATIENT GOAL: \"be able to pivot better and take some steps\"     General:  General  Reason for Referral: s/p L MCA  Referred By: Gillian Foreman MD  Past Medical History Relevant to Rehab: HTN, DM2  Preferred " Learning Style: auditory, kinesthetic, written, visual, verbal  Payor: ANTHEM / Plan: ANTHEM HMP / Product Type: *No Product type* /   Gillian Foreman    Precautions  Precautions  STEADI Fall Risk Score (The score of 4 or more indicates an increased risk of falling): 5  Precautions Comment: Fall Risk, Non-Verbal       Pain  Pain Assessment: 0-10  Pain Score: 0 - No pain      Objective     MUSCULOSKELETAL SYSTEM:      GROSS SYMMETRY:  stand/sitting:  rounded shoulders, forward head      GROSS RANGE OF MOTION: WFL except R UE        GROSS STRENGTH:  Impaired R LE hemiplegia  Muscle Right LE Left LE   Hip Flexion (L1-L2) 0/5 3+/5   Hip Abduction 0/5 3/5   Knee Flexion (L3) 0/5 4-/5   Knee Extension (L4) 0/5 4/5   Ankle Dorsiflexion (L5) 0/5 4/5   Ankle Plantarflexion (S1) 0/5 3/5     NEUROMUSCULAR SYSTEM:      OBSERVATION: R UE flexor synergy      TONE: Slight hypotonia R LE     COORDINATION:    PEBBLES: Impaired BL LE   HTS: Impaired BL LE      REFLEXES:           Patellar: 2+ R LE          Achilles: 0 R LE     TRANSFERS:       SIT <> STAND: Mod to Max A x 1    W/C <> Mat: Max A x 1 via squat pivot    SIT<> STAND at // Bars: Mod A x 1      BED MOBILITY:       Rolling R: Min A with therapist acting as rail   Rolling L: Max A x 1              Sit to Supine: Min A x 1 through sidelying      BALANCE:    SITTING: Fair with L UE support    STANDING: Poor      GAIT:  Attempted ambulation in // bars with w/c follow, however, pt unable to initiate step with R LE. NT further d/t safety concerns       Outcome Measures:  Sarkar Balance Scale  1. Sitting to Standing: Needs moderate or maximal assist to stand  2. Standing Unsupported: Unable to stand 30 seconds unsupported  3. Sitting with Back Unsupported but Feet Supported on Floor or on a Stool: Able to sit 30 seconds  4. Standing to Sitting: Needs assist to sit  5.  Transfers: Needs one person to assist  6. Standing Unsupported with Eyes Closed: Needs help to keep from falling  7.  Standing Unsupported with Feet Together: Needs help to attain position and unable to hold for 15 seconds  8. Reach Forward with Outstretched Arm While Standing: Loses balance while trying/requires external support  9.  Object from Floor from a Standing Position: Unable to try/needs assist to keep from losing balance or falling  10. Turning to Look Behind Over Left and Right Shoulders While Standing: Needs assist to keep from losing balance or falling  11. Turn 360 Degrees: Needs assistance while turning  12. Place Alternate Foot on Step or Stool While Standing Unsupported: Needs assistance to keep from falling/unable to try  13. Standing Unsupported One Foot in Front: Loses balance while stepping or standing  14. Standing on One Leg: Unable to try needs assist to prevent fall  Sarkar Balance Score: 3      Treatments:  THERAPEUTIC ACTIVITY x 10 minutes  Educated patient on purpose and benefits of neurologic physical therapy   Educated patient on purpose of exercises and importance of regular daily home program compliance  Discussed Stroke recovery with patient and wife  Discussed patient's current functional mobility and goal of therapy to maintain functional mobility and aide in improving functional mobility in chronic stroke depending on mm strength and activation return  Discussed OT Consult for R UE, pt and wife expressed agreement.  Passive L HS stretch 2 x 30 sec  Passive L hip/knee ROM - Flexion/Extension x 6 ea  Provided patient with visual, verbal, and written instruction via printed home program handout to ensure carryover       EDUCATION:  Access Code: [email or web address]  URL: [email or web address]  Date: 05/06/2024  Prepared by: Beth Ga    Exercises  - Supine Hamstring Stretch with Caregiver  - 1 x daily - 7 x weekly - 3 sets - 10 reps  - Supine Gastroc Stretch with Caregiver  - 1 x daily - 7 x weekly - 3 sets - 10 reps  - Hip and Knee Extension and Flexion Caregiver PROM  - 1 x daily - 7 x  "weekly - 3 sets - 10 reps  - Hip Abduction and Adduction Caregiver PROM  - 1 x daily - 7 x weekly - 3 sets - 10 reps  - Hip Flexion with Knee Extension Caregiver PROM  - 1 x daily - 7 x weekly - 3 sets - 10 reps  - Caregiver PROM Hip Internal External Rotation  - 1 x daily - 7 x weekly - 3 sets - 10 reps  - Foot Inversion and Eversion PROM Caregiver  - 1 x daily - 7 x weekly - 3 sets - 10 reps      Goals:  Active       PT Problem       PT Goal 1       Start:  05/06/24    Expected End:  08/04/24       Patient will perform w/c to bed or mat transfer via stand or squat pivot at Mod Assist in order to decrease caregiver burden.         PT Goal 2       Start:  05/06/24    Expected End:  08/04/24       Patient will perform sit to stand with LRAD at Min Assist to improve functional mobility         PT Goal 3       Start:  05/06/24    Expected End:  08/04/24       Patient will improve Sarkar Balance by 4 pts to meet MDC for chronic stroke population and improve balance         PT Goal 4       Start:  05/06/24    Expected End:  08/04/24       Patient will perform home program with assist of caregiver to improve ROM and maintain functional mobility         PT Goal 5       Start:  05/06/24    Expected End:  08/04/24       Patient will perform all bed mobility at stand by assist to improve functional mobility and independence          Patient Stated Goal 1       Start:  05/06/24    Expected End:  08/04/24       Per wife: \"be able to pivot better and take some steps\"                "

## 2024-05-07 ENCOUNTER — TREATMENT (OUTPATIENT)
Dept: SPEECH THERAPY | Facility: CLINIC | Age: 69
End: 2024-05-07
Payer: COMMERCIAL

## 2024-05-07 DIAGNOSIS — I69.320 APHASIA FOLLOWING CEREBRAL INFARCTION: ICD-10-CM

## 2024-05-07 DIAGNOSIS — I69.390 APRAXIA FOLLOWING CEREBROVASCULAR ACCIDENT: ICD-10-CM

## 2024-05-07 DIAGNOSIS — I69.351 HEMIPLEGIA AND HEMIPARESIS FOLLOWING CEREBRAL INFARCTION AFFECTING RIGHT DOMINANT SIDE (MULTI): Primary | ICD-10-CM

## 2024-05-07 PROCEDURE — 92507 TX SP LANG VOICE COMM INDIV: CPT | Mod: GN

## 2024-05-07 ASSESSMENT — PAIN - FUNCTIONAL ASSESSMENT: PAIN_FUNCTIONAL_ASSESSMENT: 0-10

## 2024-05-07 ASSESSMENT — PAIN SCALES - GENERAL: PAINLEVEL_OUTOF10: 0 - NO PAIN

## 2024-05-07 NOTE — PROGRESS NOTES
"Speech-Language Pathology    SLP Adult Outpatient Speech-Language Pathology Treatment     Patient Name: New Pierre  MRN: [id number]  Today's Date: 5/7/2024  Time Calculation  Start Time: 1110  Stop Time: 1200  Time Calculation (min): 50 min      Current Problem:   1. Apraxia following cerebrovascular accident  Follow Up In Speech Therapy      2. Aphasia following cerebral infarction  Follow Up In Speech Therapy          SLP Assessment:  SLP TX Intervention Outcome: Making Progress Towards Goals  Treatment Provided: Yes, see Objective for details  Treatment Tolerance: Patient tolerated treatment well      Plan:  SLP TX Plan: Continue Plan of Care  SLP Frequency: 1-2x per week  Discussed POC: Patient  Discussed Risks/Benefits: Yes, Patient  Patient/Caregiver Agreeable: Yes      Subjective   Patient well, arriving to treatment accompanied by his  and attending the session independently. Patient brought his Lingraph[employer] device to today's session. Patient was unable to provide report beyond non-verbal head nod/shake. Pain scale on his SGD was used so that patient could express that he did not have pain today. He needed assistance locating this scale on his device. Patient also returned home assignments on which his wife noted that he has been practicing speech sound production and has even spontaneously verbalized \"yes\" and \"no\" on multiple occasions over the last 4 days!       General Visit Information:  Number of Authorized Treatments : Based on MN (Shea)  Total Number of Visits : 6      Pain Assessment:  Pain Assessment: 0-10  Pain Score: 0 - No pain      Objective     GOALS: Start date: 4/8/2024 ; End/re-eval date: 7/1/2024  By discharge:  LTG: Patient will comprehend communication and express information for basic wants/needs/ideas using total communication in order to increased participation in the home and community environments.    STGs:   1. Patient will perform auditory comprehension tasks " "(e.g. yes/no questions, etc.) using total communication with 90% accuracy given mild cueing.  > NOT ADDRESSED. Informally.    2. Patient will imitate oral movements for basic sound combinations (VC, CV, etc.) with or without vocal production in 9/10 opportunities given 1:1 verbal and visual models.  > ADDRESSED: Patient successfully imitated oral movements for vowels /e, i, ai, o, u, a/ with mod-max modeling and verbal cueing. He performed lingual lift for alveolar sounds /n, t, d, l, s, z/. Patient formed labiodental /f/ for the first time following verbal and visual modeling! He performed VC and CV syllables \"on, no, own.\" No spontaneous oral movements or words formed during today's visit.    3. Patient will participate in further evaluation and exploration of reading comprehension and written expression with new goals to be added to POC as appropriate.  > NOT ADDRESSED. Writing not addressed.    4. Patient will perform reading comprehension tasks at the single word through single sentence levels using total communication with 80% accuracy given mild cueing.  > ADDRESSED: Patient correctly identified word to complete a list of categorized items from a F/O 3 words in 6/10 opportunities independently (6/10). Patient identified category items from a F/O 10 possible choices with 87% accuracy given mod cueing (x9 incorrect items selected during practice). HEP added to today.        Outpatient Education:  Adult Outpatient Education  Verbal Education : Summary of session performance; HEP  Risk and Benefits Discussed with Patient/Caregiver/Other: yes  Patient/Caregiver Demonstrated Understanding: yes  Plan of Care Discussed and Agreed Upon: yes  Patient Response to Education: Patient/Caregiver Verbalized Understanding of Information, Patient/Caregiver Asked Appropriate Questions  "

## 2024-05-09 ENCOUNTER — TREATMENT (OUTPATIENT)
Dept: SPEECH THERAPY | Facility: CLINIC | Age: 69
End: 2024-05-09
Payer: COMMERCIAL

## 2024-05-09 DIAGNOSIS — I69.390 APRAXIA FOLLOWING CEREBROVASCULAR ACCIDENT: ICD-10-CM

## 2024-05-09 DIAGNOSIS — I69.320 APHASIA FOLLOWING CEREBRAL INFARCTION: ICD-10-CM

## 2024-05-09 PROCEDURE — 92507 TX SP LANG VOICE COMM INDIV: CPT | Mod: GN

## 2024-05-09 ASSESSMENT — PAIN - FUNCTIONAL ASSESSMENT: PAIN_FUNCTIONAL_ASSESSMENT: 0-10

## 2024-05-09 ASSESSMENT — PAIN SCALES - GENERAL: PAINLEVEL_OUTOF10: 0 - NO PAIN

## 2024-05-09 NOTE — PROGRESS NOTES
Speech-Language Pathology    SLP Adult Outpatient Speech-Language Pathology Treatment     Patient Name: New Pierre  MRN: [id number]  Today's Date: 5/9/2024  Time Calculation  Start Time: 1115  Stop Time: 1200  Time Calculation (min): 45 min      Current Problem:   1. Aphasia following cerebral infarction  Follow Up In Speech Therapy      2. Apraxia following cerebrovascular accident  Follow Up In Speech Therapy          SLP Assessment:  SLP TX Intervention Outcome: Making Progress Towards Goals  Treatment Provided: Yes, see Objective for details  Treatment Tolerance: Patient tolerated treatment well      Plan:  SLP TX Plan: Continue Plan of Care  SLP Frequency: 1-2x per week  Discussed POC: Patient  Discussed Risks/Benefits: Yes, Patient  Patient/Caregiver Agreeable: Yes      Subjective   Patient well, arriving to treatment accompanied by his  and attending the majority of the session independently, joined by one of his aides near the end of the visit. Patient brought his Lingraphica device to today's session. Patient was unable to provide report beyond non-verbal head nod/shake. Pain scale on his SGD was used so that patient could express that he did not have pain today. Patient's aide reported that his wife downloaded another [employer] mandy for addressing numbers and money. They have been practicing speech sound /f/ at home.       General Visit Information:  Number of Authorized Treatments : Based on MN (Shea)  Total Number of Visits : 7      Pain Assessment:  Pain Assessment: 0-10  Pain Score: 0 - No pain      Objective     GOALS: Start date: 4/8/2024 ; End/re-eval date: 7/1/2024  By discharge:  LTG: Patient will comprehend communication and express information for basic wants/needs/ideas using total communication in order to increased participation in the home and community environments.    STGs:   1. Patient will perform auditory comprehension tasks (e.g. yes/no questions, etc.) using  "total communication with 90% accuracy given mild cueing.  > NOT ADDRESSED. Informally.    2. Patient will imitate oral movements for basic sound combinations (VC, CV, etc.) with or without vocal production in 9/10 opportunities given 1:1 verbal and visual models.  > ADDRESSED: Patient successfully imitated oral movements for vowels /e, i, ai, o, u, a/ with mod-max modeling and verbal cueing. He performed lingual lift for alveolar sounds /n, t, d, l, s, z/. Patient was unable to form labiodental /f/ as he had at last visit. He performed VC and CV syllables \"on, no, own, one, ow.\" Patient spontaneously formed and vocalized, although softly, the word \"no!\"    3. Patient will participate in further evaluation and exploration of reading comprehension and written expression with new goals to be added to POC as appropriate.  > NOT ADDRESSED. Writing not addressed.    4. Patient will perform reading comprehension tasks at the single word through single sentence levels using total communication with 80% accuracy given mild cueing.  > ADDRESSED: Patient correctly identified opposites from a F/O 3 choices with 60% accuracy independently today, 90% accuracy given mod-max cueing. Synonyms with choices also addressed, however, task was discont'd in light of significantly low accuracy. HEP added to today.        Outpatient Education:  Adult Outpatient Education  Verbal Education : Summary of session performance; HEP  Risk and Benefits Discussed with Patient/Caregiver/Other: yes  Patient/Caregiver Demonstrated Understanding: yes  Plan of Care Discussed and Agreed Upon: yes  Patient Response to Education: Patient/Caregiver Verbalized Understanding of Information, Patient/Caregiver Asked Appropriate Questions  "

## 2024-05-13 ENCOUNTER — OFFICE VISIT (OUTPATIENT)
Dept: PRIMARY CARE | Facility: CLINIC | Age: 69
End: 2024-05-13
Payer: COMMERCIAL

## 2024-05-13 VITALS — DIASTOLIC BLOOD PRESSURE: 70 MMHG | SYSTOLIC BLOOD PRESSURE: 110 MMHG | OXYGEN SATURATION: 96 % | HEART RATE: 76 BPM

## 2024-05-13 DIAGNOSIS — I69.351 HEMIPLEGIA AND HEMIPARESIS FOLLOWING CEREBRAL INFARCTION AFFECTING RIGHT DOMINANT SIDE (MULTI): ICD-10-CM

## 2024-05-13 DIAGNOSIS — I69.390 APRAXIA FOLLOWING CEREBROVASCULAR ACCIDENT: ICD-10-CM

## 2024-05-13 DIAGNOSIS — I69.320 APHASIA FOLLOWING CEREBRAL INFARCTION: ICD-10-CM

## 2024-05-13 DIAGNOSIS — L71.9 ROSACEA: ICD-10-CM

## 2024-05-13 DIAGNOSIS — R01.1 MURMUR, HEART: Primary | ICD-10-CM

## 2024-05-13 DIAGNOSIS — I63.9 ISCHEMIC CEREBROVASCULAR ACCIDENT (CVA) (MULTI): ICD-10-CM

## 2024-05-13 DIAGNOSIS — I10 PRIMARY HYPERTENSION: ICD-10-CM

## 2024-05-13 PROCEDURE — 3008F BODY MASS INDEX DOCD: CPT | Performed by: FAMILY MEDICINE

## 2024-05-13 PROCEDURE — 99214 OFFICE O/P EST MOD 30 MIN: CPT | Performed by: FAMILY MEDICINE

## 2024-05-13 PROCEDURE — 1123F ACP DISCUSS/DSCN MKR DOCD: CPT | Performed by: FAMILY MEDICINE

## 2024-05-13 PROCEDURE — 1159F MED LIST DOCD IN RCRD: CPT | Performed by: FAMILY MEDICINE

## 2024-05-13 PROCEDURE — 3074F SYST BP LT 130 MM HG: CPT | Performed by: FAMILY MEDICINE

## 2024-05-13 PROCEDURE — 1160F RVW MEDS BY RX/DR IN RCRD: CPT | Performed by: FAMILY MEDICINE

## 2024-05-13 PROCEDURE — 3078F DIAST BP <80 MM HG: CPT | Performed by: FAMILY MEDICINE

## 2024-05-13 RX ORDER — TRIAMCINOLONE ACETONIDE 1 MG/G
CREAM TOPICAL 2 TIMES DAILY
Qty: 30 G | Refills: 0 | Status: SHIPPED | OUTPATIENT
Start: 2024-05-13

## 2024-05-13 RX ORDER — METRONIDAZOLE 7.5 MG/G
GEL TOPICAL 2 TIMES DAILY
Qty: 45 G | Refills: 2 | Status: SHIPPED | OUTPATIENT
Start: 2024-05-13 | End: 2025-05-13

## 2024-05-13 NOTE — PROGRESS NOTES
Subjective   Patient ID: New Pierre is a 68 y.o. male who presents for Hospital Follow-up.    HPI    REVIEW OF ADMISSION:  Patient was admitted to [facility] 4/22/2024 for altered mental status when he was staring while at the [facility] and they were showering him    Admitted for observation with his history of CVS and residual right sided hemiplegia/aphasia  Had EEG and CT head and did see neurology  EEG essentially normal with changes consistent with his previous CVA    CT negative for acute bleeding     He is followed by neurology, Dr. Vu at [facility]  He was transitioned to outpatient but therapist felt she could not help him much at the   rehab center.    Has order for OT but has not started this yet.    Has an aid for 6 hours twice per week through an agency, but he has not shown for appointments.      Review of Systems    Review of Systems negative except as noted in HPI and Chief complaint.     Objective             VITALS:  /70 (BP Location: Left arm, Patient Position: Sitting, BP Cuff Size: Adult)   Pulse 76   SpO2 96%      Physical Exam  Constitutional:       General: He is not in acute distress.     Appearance: Normal appearance. He is not ill-appearing.   HENT:      Head: Normocephalic and atraumatic.   Neck:      Vascular: No carotid bruit.   Cardiovascular:      Rate and Rhythm: Normal rate and regular rhythm.      Pulses: Normal pulses.      Heart sounds: Murmur (3/6 systolic murmur loudest at LUSB) heard.      No gallop.   Pulmonary:      Effort: Pulmonary effort is normal.      Breath sounds: Normal breath sounds. No wheezing, rhonchi or rales.   Musculoskeletal:      Cervical back: Normal range of motion and neck supple. No rigidity or tenderness.   Lymphadenopathy:      Cervical: No cervical adenopathy.   Skin:     General: Skin is warm and dry.   Neurological:      Mental Status: He is alert.   Psychiatric:         Mood and Affect: Mood normal.         Behavior:  Behavior normal.         Assessment/Plan   Problem List Items Addressed This Visit       Murmur, heart - Primary     Reordering echo.  Murmur unchanged from last visit.         Relevant Orders    Transthoracic Echo (TTE) Complete (Completed)    Ischemic cerebrovascular accident (CVA) (Multi)    Primary hypertension     BP controlled.  Watch for any signs of hypotension or dizziness.  Follow-up 3 months.         Hemiplegia and hemiparesis following cerebral infarction affecting right dominant side (Multi)     Continue with outpatient PT/OT.  Awaiting aid availability to help with transportation.         Aphasia following cerebral infarction     Homecare evaluation ordered.  Recommend ST for speech issues to continue.  Continue using communication device to help with interactions.         Apraxia following cerebrovascular accident     Other Visit Diagnoses       Rosacea        Relevant Medications    metroNIDAZOLE (Metrogel) 0.75 % gel    triamcinolone (Kenalog) 0.1 % cream            FOLLOW UP 3 MONTHS, SOONER WITH ANY PROBLEMS OR CONCERNS.

## 2024-05-16 ENCOUNTER — PATIENT OUTREACH (OUTPATIENT)
Dept: PRIMARY CARE | Facility: CLINIC | Age: 69
End: 2024-05-16
Payer: COMMERCIAL

## 2024-05-16 DIAGNOSIS — I63.132 CEREBRAL INFARCTION DUE TO EMBOLISM OF LEFT CAROTID ARTERY (MULTI): ICD-10-CM

## 2024-05-16 DIAGNOSIS — M62.81 MUSCLE WEAKNESS (GENERALIZED): ICD-10-CM

## 2024-05-16 DIAGNOSIS — I69.351 HEMIPLEGIA AND HEMIPARESIS FOLLOWING CEREBRAL INFARCTION AFFECTING RIGHT DOMINANT SIDE (MULTI): ICD-10-CM

## 2024-05-16 DIAGNOSIS — I69.320 APHASIA FOLLOWING CEREBRAL INFARCTION: ICD-10-CM

## 2024-05-16 PROCEDURE — 99490 CHRNC CARE MGMT STAFF 1ST 20: CPT | Performed by: FAMILY MEDICINE

## 2024-05-16 NOTE — CARE PLAN
Reviewed chart prior to [facility] patient outreach. Spoke with patient's spouse Zabrina. Patient is unable to communicate verbally on phone. Patient is doing well at time of outreach.  His wife works from home and provides patient's care. Home health aides to assist with patient's care twice daily. Patient is now going to adult day care on Mondays and has showers at facility, and his wife goes to the office on Mondays.  [employer] approved and covers the cost of one of two different home health agencies they use to get adequate coverage, and they pay out of pocket for the other.    She keeps his mind active with [employer] and is working with him to go through his email. He previously was unable to use his cellphone due to not recalling which buttons to push and would get frustrated. Now he is able to do more on his tablet and cellphone.     Zabrina reports that patient is now able to stand for 4 minutes, last outreach he was at 2 minutes. Patient needs a brace to support his weaker leg. In process of getting brace through [facility]. Patient was measure and now a cast is being made. Patient has an appointment for outpatient physical and occupational therapy on 6/3/24. Zabrina noticed that patient has started moving his bad leg more; such as crossing it over his good leg, scooting up the bed, and curled up both legs while lying in fetal position. She has also noticed that his bad leg does not feel as cool to the touch. He is using an electric peddler at home to help strengthen his legs.    Patient is going to outpatient speech therapy through . His wife has been impressed with the level of care and progress he has made since starting. She reported that the outpatient speech therapist stated that he is capable and is making the sounds in his throat he needs to make, will work with him on re-learning how to move his mouth to form the words. They are working with him to form the words with his mouth, even if he  sound does not come out. His wife has noticed that she hears him laugh more now.     Reviewed upcoming appointments with patient's wife. No other questions or concerns identified at this time. Patient will reach out as needed with any non-urgent questions or concerns.

## 2024-05-29 ENCOUNTER — HOSPITAL ENCOUNTER (OUTPATIENT)
Dept: CARDIOLOGY | Facility: CLINIC | Age: 69
Discharge: HOME | End: 2024-05-29
Payer: COMMERCIAL

## 2024-05-29 DIAGNOSIS — R01.1 MURMUR, HEART: Primary | ICD-10-CM

## 2024-05-29 DIAGNOSIS — R01.1 MURMUR, HEART: ICD-10-CM

## 2024-05-29 LAB
AORTIC VALVE MEAN GRADIENT: 8.9 MMHG
AORTIC VALVE PEAK VELOCITY: 2.08 M/S
AV PEAK GRADIENT: 17.3 MMHG
AVA (PEAK VEL): 1.86 CM2
AVA (VTI): 1.84 CM2
EJECTION FRACTION APICAL 4 CHAMBER: 42.4
LEFT ATRIUM VOLUME AREA LENGTH INDEX BSA: 37.8 ML/M2
LEFT VENTRICLE INTERNAL DIMENSION DIASTOLE: 4.39 CM (ref 3.5–6)
LEFT VENTRICULAR OUTFLOW TRACT DIAMETER: 2.05 CM
LV EJECTION FRACTION BIPLANE: 40 %
MITRAL VALVE E/A RATIO: 0.53
MITRAL VALVE E/E' RATIO: 8.77
RIGHT VENTRICLE FREE WALL PEAK S': 7.83 CM/S

## 2024-05-29 PROCEDURE — 93306 TTE W/DOPPLER COMPLETE: CPT | Performed by: INTERNAL MEDICINE

## 2024-05-29 PROCEDURE — 2500000004 HC RX 250 GENERAL PHARMACY W/ HCPCS (ALT 636 FOR OP/ED): Performed by: INTERNAL MEDICINE

## 2024-05-29 PROCEDURE — 93306 TTE W/DOPPLER COMPLETE: CPT

## 2024-05-29 RX ADMIN — PERFLUTREN 2 ML OF DILUTION: 6.52 INJECTION, SUSPENSION INTRAVENOUS at 13:53

## 2024-05-30 NOTE — ASSESSMENT & PLAN NOTE
Homecare evaluation ordered.  Recommend ST for speech issues to continue.  Continue using communication device to help with interactions.

## 2024-06-03 ENCOUNTER — DOCUMENTATION (OUTPATIENT)
Dept: PHYSICAL THERAPY | Facility: CLINIC | Age: 69
End: 2024-06-03
Payer: COMMERCIAL

## 2024-06-03 ENCOUNTER — DOCUMENTATION (OUTPATIENT)
Dept: OCCUPATIONAL THERAPY | Facility: CLINIC | Age: 69
End: 2024-06-03
Payer: COMMERCIAL

## 2024-06-03 NOTE — PROGRESS NOTES
Occupational Therapy                 Therapy Communication Note    Patient Name: New Pierre  MRN: [id number]  Today's Date: 6/3/2024     Discipline: Occupational Therapy      Missed Time: No Show     OT evaluation

## 2024-06-03 NOTE — PROGRESS NOTES
Physical Therapy                 Therapy Communication Note    Patient Name: New Pierre  MRN: [id number]  Today's Date: 6/3/2024     Discipline: Physical Therapy    Missed Visit Reason:      Missed Time: No Show    Comment: Pt no call/no show for scheduled appt

## 2024-06-05 ENCOUNTER — EVALUATION (OUTPATIENT)
Dept: OCCUPATIONAL THERAPY | Facility: CLINIC | Age: 69
End: 2024-06-05
Payer: COMMERCIAL

## 2024-06-05 ENCOUNTER — TREATMENT (OUTPATIENT)
Dept: PHYSICAL THERAPY | Facility: CLINIC | Age: 69
End: 2024-06-05
Payer: COMMERCIAL

## 2024-06-05 DIAGNOSIS — Z74.09 IMPAIRED MOBILITY: Primary | ICD-10-CM

## 2024-06-05 DIAGNOSIS — I63.132 CEREBRAL INFARCTION DUE TO EMBOLISM OF LEFT CAROTID ARTERY (MULTI): ICD-10-CM

## 2024-06-05 DIAGNOSIS — G81.11 RIGHT SPASTIC HEMIPLEGIA (MULTI): Primary | ICD-10-CM

## 2024-06-05 DIAGNOSIS — R29.898 WEAKNESS OF RIGHT LOWER EXTREMITY: ICD-10-CM

## 2024-06-05 PROCEDURE — 97530 THERAPEUTIC ACTIVITIES: CPT | Mod: GP

## 2024-06-05 PROCEDURE — 97110 THERAPEUTIC EXERCISES: CPT | Mod: GO

## 2024-06-05 PROCEDURE — 97165 OT EVAL LOW COMPLEX 30 MIN: CPT | Mod: GO

## 2024-06-05 ASSESSMENT — PAIN - FUNCTIONAL ASSESSMENT: PAIN_FUNCTIONAL_ASSESSMENT: 0-10

## 2024-06-05 ASSESSMENT — BALANCE ASSESSMENTS
REACHING FORWARD WITH OUTSTRETCHED ARM WHILE STANDING: LOSES BALANCE WHILE TRYING/REQUIRES EXTERNAL SUPPORT
STANDING ON ONE LEG: UNABLE TO TRY NEEDS ASSIST TO PREVENT FALL
PLACE ALTERNATE FOOT ON STEP OR STOOL WHILE STANDING UNSUPPORTED: NEEDS ASSIST TO KEEP FROM LOSING BALANCE OR FALLING
STANDING TO SITTING: NEEDS ASSIST TO SIT
SITTING WITH BACK UNSUPPORTED BUT FEET SUPPORTED ON FLOOR OR ON A STOOL: ABLE TO SIT 2 MINUTES UNDER SUPERVISION
LONG VERSION TOTAL SCORE (MAX 56): 4
STANDING UNSUPPORTED: UNABLE TO STAND 30 SECONDS UNSUPPORTED
STANDING UNSUPPORTED WITH FEET TOGETHER: NEEDS HELP TO ATTAIN POSITION AND UNABLE TO HOLD FOR 15 SECONDS
STANDING UNSUPPORTED ONE FOOT IN FRONT: LOSES BALANCE WHILE STEPPING OR STANDING
TRANSFERS: NEEDS ONE PERSON TO ASSIST
PICK UP OBJECT FROM THE FLOOR FROM A STANDING POSITION: UNABLE TO TRY/NEEDS ASSIST TO KEEP FROM LOSING BALANCE OR FALLING
STANDING TO SITTING: NEEDS MODERATE OR MAXIMAL ASSIST TO STAND
TURN 360 DEGREES: NEEDS ASSISTANCE WHILE TURNING
PLACE ALTERNATE FOOT ON STEP OR STOOL WHILE STANDING UNSUPPORTED: NEEDS ASSISTANCE TO KEEP FROM FALLING/UNABLE TO TRY
STANDING UNSUPPORTED WITH EYES CLOSED: NEEDS HELP TO KEEP FROM FALLING

## 2024-06-05 ASSESSMENT — PAIN SCALES - GENERAL: PAINLEVEL_OUTOF10: 0 - NO PAIN

## 2024-06-05 NOTE — PROGRESS NOTES
Occupational Therapy    Occupational Therapy Evaluation    Name: New Pierre  MRN: [id number]  : 1955  Date: 24  Time Calculation  Start Time: 1115  Stop Time: 1200  Time Calculation (min): 45 min  OT Evaluation Time Entry  OT Evaluation (Low) Time Entry: 30  OT Therapeutic Procedures Time Entry  Therapeutic Exercise Time Entry: 15                  Visit: 1  Daniel Rahman  Medicare cert: 24-24  Needs auth  Primary dx: R spastic hemiparesis   Assessment:   Patient is a 68 year old male with h/o  chronic stroke (23), resulting in spastic RUE hemiplegia. Patient would benefit from a few OT visits to  establish an HEP and educate patient and caregiver on stretching exercises for the RUE to maintain ROM and decrease risk of contracture and pain during ADLs.   Plan:   OT POC to include: neuro re-ed, manual therapy, therapeutic exercise, therapeutic activity, HEP    1x  week for 3-4 visits   Rehab potential: fair + (chronic stroke)  Patient in agreement with plan     Subjective  Patient agreeable to OT evaluation. Accompanied by caregiver who gave history due to patient being non verbal. Has assist with all ADLs. Patient can however brush his own teeth and wash his face with set up. Washes up at home with sponge bath, and uses a urinal. Caregiver reports they are working on transferring now to a commode next to the bed.   Has a R hand splint at home spouse puts on patient for a couple of hours a day.  Patient has not received any Botox in the RUE.   Current Problem:  1. Right spastic hemiplegia (Multi)          General:      General  Reason for Referral: OT eval and treat  Referred By:  (Gillian Foreman DO)  Preferred Learning Style: visual, kinesthetic  L MCA on 23 resulting in R sided hemiplegia, hemiparesis, and aphasia.   Precautions:  Precautions  STEADI Fall Risk Score (The score of 4 or more indicates an increased risk of falling): 5  Precautions Comment:  (fall risk)  Vital  "Signs:   Not taken   Pain Assessment:  Pain Assessment  Pain Assessment: 0-10  Pain Score: 0 - No pain  Work History:  Retired   Prior Level of Function:  Has not been independent since stroke  Roles/Routines:  Goes to a day program now on Mondays  Patient Goal:  \"Get better\"  Personal Factors that my impact Care:   Nonverbal; chronic stroke   Objective   Neuro:  Observation:  Non verbal, has device for communication  Came in manual wheelchair  Has glasses  RUE in spastic flexion posture  Palpation:  Kyphotic upper posture   ROM:  RUE: PROM only  Shoulder PROM to 90 degrees  Strength:  RUE: 0/5  Coordination:  RUE impaired  Sensation:  Denies parathesias in the RUE  Tremor   No tremor   Outcome Measures:  Tone:  RUE:  1+  MAS shoulder  2 MAS bicep  1+ MAS wrist and hand    OP EDUCATION:/Treatment:   Educated patient and caregiver on RUE positioning with a pillow in sitting and while lying in bed to reduce internal rotation of the R shoulder.  Patient competed and was educated on towel slides sitting at table top. Completed shoulder flexion as tolerated with guiding support from therapist at R elbow and wrist. Caregiver was also educated during exercises, and handouts given.    Goals:  Active       OT Goals       Home program       Start:  06/05/24    Expected End:  08/04/24       Patient and caregivers will demonstrate understanding of established HEP which will include optimal positioning, stretching, and splint wear and care to decrease risk of contracture and promote good hygiene of the RUE.                "

## 2024-06-05 NOTE — PROGRESS NOTES
Physical Therapy    Physical Therapy Treatment    Patient Name: New Pierre  MRN: [id number]  Today's Date: 6/5/24        PT Therapeutic Procedures Time Entry  Therapeutic Activity Time Entry: 45       Visit number: 2  Insurance: [facility]  Plan of Care Dates: 5/6/24 - 8/4/24  Authorized visits: Pending Auth  Authorization end date: Pending Auth  Primary diagnosis: Impaired mobility      Assessment:     New Pierre  is a 68 y.o. old patient who presents to therapy s/p L [facility] on 6/8/23 resulting in R sided hemiplegia, hemiparesis, and aphasia. Patient continues to present with signs and symptoms consistent with R sided hemiplegia, hemiparesis. his impairments include: balance deficits, strength deficits, AROM deficits, and R LE motor control deficits.  Due to these impairments, he has the following functional limitations and participation restrictions: gait deviations, increased fall risk, stair negotiation, transfers, bed mobility, performing household/recreational activities, and performing some ADLS.  Skilled physical therapy services are appropriate and beneficial in order to achieve measurable and meaningful change in the objective tests and measures. Utilization of skilled physical therapy services will aid in advancing his functional status and decreasing care-giver burden. The patient expressed understanding and is in agreement with all goals and plan of care. Plan of care was developed with input and agreement by the patient    Patient presents for first follow-up PT session this date with new insurance requiring recheck for authorization which was completed this date. Patient tolerated all therapeutic activities well with moderate fatigue. Performed bed mobility and transfers to focus on decreasing caregiver burden. Patient requires Max cues for rocking in order to use momentum to assist with sit to stands as well as proper technique with pivot transfers. He continues to require Max to Mod A x 1  with all transfers and standing depending on fatigue level.    Plan: Bed mobility, Log-roll technique to get out of bed, commode transfers, standing tolerance  OP PT Plan  Treatment/Interventions: Education/ Instruction, Gait training, Manual therapy, Neuromuscular re-education, Orthosis fit/ training, Prosthetic management/ training, Self care/ home management, Therapeutic activities, Therapeutic exercises, W/C Management training  PT Plan: Skilled PT  PT Frequency:  (1-2x/week)  Duration: 10 visits  Onset Date: 06/08/23  Certification Period Start Date: 06/05/24  Certification Period End Date: 09/03/24  Number of Treatments Authorized: Pending authorization  Rehab Potential: Fair  Plan of Care Agreement: Patient    Current Problem  1. Impaired mobility  Follow Up In Physical Therapy    Follow Up In Physical Therapy      2. Cerebral infarction due to embolism of left carotid artery (Multi)  Follow Up In Physical Therapy    Follow Up In Physical Therapy      3. Weakness of right lower extremity  Follow Up In Physical Therapy    Follow Up In Physical Therapy            Subjective    Patient presents with home health aide this date who is present throughout session.    Precautions:    Fall Risk, Non-verbal    Pain   Denies pain, 0/10    Objective   STRENGTH:  Impaired R LE hemiplegia  Muscle Right LE   Hip Flexion (L1-L2) 0/5   Hip Abduction 0/5   Knee Flexion (L3) 0/5   Knee Extension (L4) 0/5   Ankle Dorsiflexion (L5) 0/5   Ankle Plantarflexion (S1) 0/5     NEUROMUSCULAR SYSTEM:      OBSERVATION: R UE flexor synergy      TONE: Slight hypotonia R LE     TRANSFERS:       SIT <> STAND: Mod to Max A x 1    W/C <> Mat: Mod to Max A x 1 via squat pivot    SIT<> STAND at // Bars: Min to Mod A x 1      BED MOBILITY:       Rolling R: Close Supervision with max cues   Rolling L: CGA x 1 with max cues              Sit to Supine: Min A x 1 through sidelying   Supine to sit: Mod A x 1 through sidelying      BALANCE:    SITTING:  Fair with L UE support    STANDING: Poor, required Max A to maintain standing at joel-walker and Min to Mod A to maintain standing at // bars      GAIT:  NT d/t safety concerns and pt declining    Outcome Measures:         Treatments:    THERAPEUTIC ACTIVITY   Assessment of pt's POC goals completed today- see objective, goals, and assessment section. Educated pt regarding results of examination findings and discussed next steps in POC progression. Educated pt regarding importance of continuing with good HEP compliance for optimal rehab results.    Bed mobility:   - rolling L/R x 3 ea at  to [facility]  - supine to sit through sidelying with cues for leg and arm positioning at Mod A    Transfers:  - W/C to/from Mat x 5 trials at Mod to Max A x 1 depending on fatigue  - Mat table to/from drop-arm commode at Mod A x 1 via lateral transfer with L UE on CL commode rail   - W/C to stand at // bars with L UE pulling up x 2  - Standing at // bars with significant L UE support for 2 min x 2 at Mod A x 1    Sitting EOB with UL UE support x 2 min  Seated EOM reaching with L UE for target x 2 min    Instructed home health aide to perform sitting EOB and reaching for targets with L UE to promote fwd trunk lean    Trial of lateral transfer from EOB to commode with good result, plan on training aide at next visit     *pt required frequent rest breaks between activities*    OP EDUCATION:  Access Code: [email or web address]  URL: [email or web address]  Date: 05/06/2024  Prepared by: Beth Ga    Exercises  - Supine Hamstring Stretch with Caregiver  - 1 x daily - 7 x weekly - 3 sets - 10 reps  - Supine Gastroc Stretch with Caregiver  - 1 x daily - 7 x weekly - 3 sets - 10 reps  - Hip and Knee Extension and Flexion Caregiver PROM  - 1 x daily - 7 x weekly - 3 sets - 10 reps  - Hip Abduction and Adduction Caregiver PROM  - 1 x daily - 7 x weekly - 3 sets - 10 reps  - Hip Flexion with Knee Extension Caregiver PROM  - 1 x daily - 7 x weekly -  "3 sets - 10 reps  - Caregiver PROM Hip Internal External Rotation  - 1 x daily - 7 x weekly - 3 sets - 10 reps  - Foot Inversion and Eversion PROM Caregiver  - 1 x daily - 7 x weekly - 3 sets - 10 reps      Goals:  Active       PT Problem       PT Goal 1 (Progressing)       Start:  05/06/24    Expected End:  08/04/24       Patient will perform w/c to bed or mat transfer via stand or squat pivot at Mod Assist in order to decrease caregiver burden.         PT Goal 2 (Progressing)       Start:  05/06/24    Expected End:  08/04/24       Patient will perform sit to stand with LRAD at Min Assist to improve functional mobility         PT Goal 3 (Progressing)       Start:  05/06/24    Expected End:  08/04/24       Patient will improve Sarkar Balance by 4 pts to meet MDC for chronic stroke population and improve balance         PT Goal 4 (Progressing)       Start:  05/06/24    Expected End:  08/04/24       Patient will perform home program with assist of caregiver to improve ROM and maintain functional mobility         PT Goal 5 (Progressing)       Start:  05/06/24    Expected End:  08/04/24       Patient will perform all bed mobility at stand by assist to improve functional mobility and independence          Patient Stated Goal 1 (Progressing)       Start:  05/06/24    Expected End:  08/04/24       Per wife: \"be able to pivot better and take some steps\"              "

## 2024-06-07 DIAGNOSIS — L71.9 ROSACEA: ICD-10-CM

## 2024-06-11 ENCOUNTER — TREATMENT (OUTPATIENT)
Dept: PHYSICAL THERAPY | Facility: CLINIC | Age: 69
End: 2024-06-11
Payer: COMMERCIAL

## 2024-06-11 ENCOUNTER — TREATMENT (OUTPATIENT)
Dept: SPEECH THERAPY | Facility: CLINIC | Age: 69
End: 2024-06-11
Payer: COMMERCIAL

## 2024-06-11 ENCOUNTER — TREATMENT (OUTPATIENT)
Dept: OCCUPATIONAL THERAPY | Facility: CLINIC | Age: 69
End: 2024-06-11
Payer: COMMERCIAL

## 2024-06-11 ENCOUNTER — PATIENT OUTREACH (OUTPATIENT)
Dept: PRIMARY CARE | Facility: CLINIC | Age: 69
End: 2024-06-11

## 2024-06-11 DIAGNOSIS — I10 PRIMARY HYPERTENSION: ICD-10-CM

## 2024-06-11 DIAGNOSIS — I63.132 CEREBRAL INFARCTION DUE TO EMBOLISM OF LEFT CAROTID ARTERY (MULTI): ICD-10-CM

## 2024-06-11 DIAGNOSIS — I69.351 HEMIPLEGIA AND HEMIPARESIS FOLLOWING CEREBRAL INFARCTION AFFECTING RIGHT DOMINANT SIDE (MULTI): ICD-10-CM

## 2024-06-11 DIAGNOSIS — I69.390 APRAXIA FOLLOWING CEREBROVASCULAR ACCIDENT: ICD-10-CM

## 2024-06-11 DIAGNOSIS — I69.320 APHASIA FOLLOWING CEREBRAL INFARCTION: ICD-10-CM

## 2024-06-11 DIAGNOSIS — R29.898 WEAKNESS OF RIGHT LOWER EXTREMITY: ICD-10-CM

## 2024-06-11 DIAGNOSIS — G81.11 RIGHT SPASTIC HEMIPLEGIA (MULTI): Primary | ICD-10-CM

## 2024-06-11 DIAGNOSIS — Z74.09 IMPAIRED MOBILITY: Primary | ICD-10-CM

## 2024-06-11 DIAGNOSIS — R53.1 GENERALIZED WEAKNESS: ICD-10-CM

## 2024-06-11 PROCEDURE — 92507 TX SP LANG VOICE COMM INDIV: CPT | Mod: GN

## 2024-06-11 PROCEDURE — 97140 MANUAL THERAPY 1/> REGIONS: CPT | Mod: GO,59

## 2024-06-11 PROCEDURE — 97530 THERAPEUTIC ACTIVITIES: CPT | Mod: GP

## 2024-06-11 ASSESSMENT — PAIN - FUNCTIONAL ASSESSMENT
PAIN_FUNCTIONAL_ASSESSMENT: 0-10
PAIN_FUNCTIONAL_ASSESSMENT: 0-10

## 2024-06-11 ASSESSMENT — PAIN SCALES - GENERAL
PAINLEVEL_OUTOF10: 0 - NO PAIN
PAINLEVEL_OUTOF10: 0 - NO PAIN

## 2024-06-11 NOTE — PROGRESS NOTES
Speech-Language Pathology    SLP Adult Outpatient Speech-Language Pathology Treatment     Patient Name: New Pierre  MRN: [id number]  Today's Date: 6/11/2024  Time Calculation  Start Time: 0915  Stop Time: 0950  Time Calculation (min): 35 min      Current Problem:   1. Aphasia following cerebral infarction  Follow Up In Speech Therapy      2. Apraxia following cerebrovascular accident  Follow Up In Speech Therapy          SLP Assessment:  SLP TX Intervention Outcome: Making Progress Towards Goals  Treatment Provided: Yes, see Objective for details  Treatment Tolerance: Patient tolerated treatment well      Plan:  SLP TX Plan: Continue Plan of Care  SLP Frequency: 1-2x per week  Discussed POC: Patient  Discussed Risks/Benefits: Yes, Patient  Patient/Caregiver Agreeable: Yes      Subjective   Patient well, arriving to treatment accompanied by his  and attending the majority of the session independently, joined by one of his aides near the end of the visit. He arrived 15 minutes late to the visit. Patient brought his [employer] device to today's session and returned home assignments given at his last appointment on 5/9. Patient was unable to provide report beyond non-verbal head nod/shake. Pain scale on his SGD was used so that patient could express that he did not have pain today. Patient's wife wrote updates on patient's returned assignments, including that he has been working on speech sound production, and he has produced 6 words spontaneously since last visit!      General Visit Information:  [place] of Authorized Treatments : Based on [place]  Total Number of Visits : 8      Pain Assessment:  Pain Assessment: 0-10  Pain Score: 0 - No pain      Objective     GOALS: Start date: 4/8/2024; End/re-eval date: 7/1/2024  By discharge:  LTG: Patient will comprehend communication and express information for basic wants/needs/ideas using total communication in order to increased participation in the home  "and community environments.    STGs:   1. Patient will perform auditory comprehension tasks (e.g. yes/no questions, etc.) using total communication with 90% accuracy given mild cueing.  > NOT ADDRESSED. Informally.    2. Patient will imitate oral movements for basic sound combinations (VC, CV, etc.) with or without vocal production in 9/10 opportunities given 1:1 verbal and visual models.  > ADDRESSED: Patient successfully imitated oral movements for vowels /i, ai, o, u/ with mod-max modeling and verbal cueing. He additionally achieved oral positioning for /f, n, l, w, sh, m/. Camera on patient's SGD was used as a \"mirror\" today for patient's self-monitoring of oral movement and accuracy. He performed CV syllables \"no, my, mow, moo, why, we, whoa.\" No spontaneous productions or vocalizations observed today.     3. Patient will participate in further evaluation and exploration of reading comprehension and written expression with new goals to be added to POC as appropriate.  > ADDRESSED: Patient was unable to type his name or basic single words (e.g. dog) from dictation. Patient was able to copy words with mild assist. Goal added below.    4. Patient will perform reading comprehension tasks at the single word through single sentence levels using total communication with 80% accuracy given mild cueing.  > NOT ADDRESSED.    5. Patient will perform written expression tasks at the single word level with 80% accuracy given mild cueing.  > GOAL ADDED 6/11: Patient was given a short list of functional/meaningful vocabulary to use for copying practice at home. Words included, \"New, Ignacio, Zabrina, Krmarthao.\" Patient's aide will assist him with this activity using the keyboard on his SGD at home.        Outpatient Education:  Adult Outpatient Education  Verbal Education : Summary of session performance; HEP  Risk and Benefits Discussed with Patient/Caregiver/Other: yes  Patient/Caregiver Demonstrated Understanding: yes  Plan of " Care Discussed and Agreed Upon: yes  Patient Response to Education: Patient/Caregiver Verbalized Understanding of Information, Patient/Caregiver Asked Appropriate Questions

## 2024-06-11 NOTE — PROGRESS NOTES
Physical Therapy    Physical Therapy Treatment    Patient Name: New Pierre  MRN: [id number]  Today's Date: 6/5/24  Time Calculation  Start Time: 1030  Stop Time: 1115  Time Calculation (min): 45 min     PT Therapeutic Procedures Time Entry  Therapeutic Activity Time Entry: 45       Visit number: 3  [facility] [place] [facility]  Plan of Care Dates: 5/6/24 - 8/4/24  Auth not required for [place] - Primary  Primary diagnosis: Impaired mobility      Assessment:  Patient tolerated all therapeutic activities well with moderate to max fatigue. Performed transfers, standing tolerance, and anterior wt shifting in seated to focus on improving functional mobility for transfers and bed mobility as well as decreasing caregiver burden. Patient continues to require Max cues for anterior weight shifting with STS and stand pivot transfers. He was able to tolerate 3 stands for 2 min each at Mod to Max A x 1 and L UE support. Patient requires max A to progress R LE and take step with L LE, pt with R knee buckling and Max A.    Plan: Bed mobility, Log-roll technique to get out of bed, commode transfers, standing tolerance       Current Problem  1. Impaired mobility  Follow Up In Physical Therapy      2. 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 Weakness of right lower extremity  Follow Up In Physical Therapy            Subjective    Patient presents with home health aide this date who is present throughout session.    Precautions:    Fall Risk, Non-verbal    Pain   Denies pain, 0/10    Objective     Treatments:    THERAPEUTIC ACTIVITY   - W/C to/from Mat at Mod A x 1 with cues for anterior wt shifting  - W/C to stand at // bars with L UE pulling up x 3 at Mod A  - Standing at // bars with significant L UE support for 2 min x 3 at Mod to Max A x 1 *mirror for visual feedback to encourage upright posture  - Standing at // bars with significant L UE  support:   -medial/lateral wt shifting   -anterior/posterior wt shifting  - Forward stepping x 2 feet *Max A to progress R LE and wt shifting, R knee buckling with L LE progression and Max A x 1 at trunk.  - Seated Lumbar flexion with SB to endorse anterior wt shifting for transfers 3 x 10   - Sitting EOB with UL UE support x 2 min  - Seated EOM reaching with L UE for target x 2 min    Instructed home health aide and patient to focus on anterior wt shifting and leaning as pt with resistance/fear of ant wt shift with transfers    *pt required frequent rest breaks between activities*    OP EDUCATION:  Access Code: [email or web address]  URL: [email or web address]  Date: 05/06/2024  Prepared by: Beth Ga    Exercises  - Supine Hamstring Stretch with Caregiver  - 1 x daily - 7 x weekly - 3 sets - 10 reps  - Supine Gastroc Stretch with Caregiver  - 1 x daily - 7 x weekly - 3 sets - 10 reps  - Hip and Knee Extension and Flexion Caregiver PROM  - 1 x daily - 7 x weekly - 3 sets - 10 reps  - Hip Abduction and Adduction Caregiver PROM  - 1 x daily - 7 x weekly - 3 sets - 10 reps  - Hip Flexion with Knee Extension Caregiver PROM  - 1 x daily - 7 x weekly - 3 sets - 10 reps  - Caregiver PROM Hip Internal External Rotation  - 1 x daily - 7 x weekly - 3 sets - 10 reps  - Foot Inversion and Eversion PROM Caregiver  - 1 x daily - 7 x weekly - 3 sets - 10 reps      Goals:  Active       PT Problem       PT Goal 1 (Progressing)       Start:  05/06/24    Expected End:  08/04/24       Patient will perform w/c to bed or mat transfer via stand or squat pivot at Mod Assist in order to decrease caregiver burden.         PT Goal 2 (Progressing)       Start:  05/06/24    Expected End:  08/04/24       Patient will perform sit to stand with LRAD at Min Assist to improve functional mobility         PT Goal 3 (Progressing)       Start:  05/06/24    Expected End:  08/04/24       Patient will improve Sarkar Balance by 4 pts to meet MDC for chronic  "stroke population and improve balance         PT Goal 4 (Progressing)       Start:  05/06/24    Expected End:  08/04/24       Patient will perform home program with assist of caregiver to improve ROM and maintain functional mobility         PT Goal 5 (Progressing)       Start:  05/06/24    Expected End:  08/04/24       Patient will perform all bed mobility at stand by assist to improve functional mobility and independence          Patient Stated Goal 1 (Progressing)       Start:  05/06/24    Expected End:  08/04/24       Per wife: \"be able to pivot better and take some steps\"              "

## 2024-06-11 NOTE — PROGRESS NOTES
"Occupational Therapy    Occupational Therapy Treatment    Name: New Pierre  MRN: [id number]  : 1955  Date: 24  Time Calculation  Start Time: 0950  Stop Time: 1030  Time Calculation (min): 40 min  Visit: 2  [facility]  [place] 24-[place]  Primary dx: R spastic hemiparesis    Assessment:    Patient and caregiver demonstrated good understanding of home stretching program and wearing of the R hand splint.  Plan:   Continue with skilled OT POC     Subjective   Patient agreeable to treatment session. Accompanied by caregiver. \"We brought in the splint.\"  Precautions:   Fall risk  Pain Assessment:  Pain Assessment  Pain Assessment: 0-10  Pain Score: 0 - No pain    Objective    Manual Therapy: 40 mins  While therapist completed manual stretch to the RUE, therapist educated caregiver on proper technique and had demonstrate as well:    R scapular mobs: elevation/depression, abduction/adduction.  Manual passive stretch R elbow->distally    Educated patient and caregiver on good technique to don and doff R resting hand splint. Went over wearing schedule and how to hand wash.      OP EDUCATION:   RUE gentle passive stretching- given in handout form    Goals:  Active       OT Goals       Home program       Start:  24    Expected End:  24       Patient and caregivers will demonstrate understanding of established HEP which will include optimal positioning, stretching, and splint wear and care to decrease risk of contracture and promote good hygiene of the RUE.                      "

## 2024-06-13 ENCOUNTER — TREATMENT (OUTPATIENT)
Dept: PHYSICAL THERAPY | Facility: CLINIC | Age: 69
End: 2024-06-13
Payer: COMMERCIAL

## 2024-06-13 ENCOUNTER — TREATMENT (OUTPATIENT)
Dept: SPEECH THERAPY | Facility: CLINIC | Age: 69
End: 2024-06-13
Payer: COMMERCIAL

## 2024-06-13 ENCOUNTER — TREATMENT (OUTPATIENT)
Dept: OCCUPATIONAL THERAPY | Facility: CLINIC | Age: 69
End: 2024-06-13
Payer: COMMERCIAL

## 2024-06-13 DIAGNOSIS — I69.390 APRAXIA FOLLOWING CEREBROVASCULAR ACCIDENT: ICD-10-CM

## 2024-06-13 DIAGNOSIS — Z74.09 IMPAIRED MOBILITY: Primary | ICD-10-CM

## 2024-06-13 DIAGNOSIS — G81.11 RIGHT SPASTIC HEMIPLEGIA (MULTI): Primary | ICD-10-CM

## 2024-06-13 DIAGNOSIS — I63.132 CEREBRAL INFARCTION DUE TO EMBOLISM OF LEFT CAROTID ARTERY (MULTI): ICD-10-CM

## 2024-06-13 DIAGNOSIS — R29.898 WEAKNESS OF RIGHT LOWER EXTREMITY: ICD-10-CM

## 2024-06-13 DIAGNOSIS — I69.320 APHASIA FOLLOWING CEREBRAL INFARCTION: ICD-10-CM

## 2024-06-13 PROCEDURE — 97112 NEUROMUSCULAR REEDUCATION: CPT | Mod: GP

## 2024-06-13 PROCEDURE — 97112 NEUROMUSCULAR REEDUCATION: CPT | Mod: GO,59

## 2024-06-13 PROCEDURE — 97140 MANUAL THERAPY 1/> REGIONS: CPT | Mod: GO,59

## 2024-06-13 PROCEDURE — 97110 THERAPEUTIC EXERCISES: CPT | Mod: GO,59

## 2024-06-13 PROCEDURE — 92507 TX SP LANG VOICE COMM INDIV: CPT | Mod: GN

## 2024-06-13 PROCEDURE — 97110 THERAPEUTIC EXERCISES: CPT | Mod: GP

## 2024-06-13 ASSESSMENT — PAIN - FUNCTIONAL ASSESSMENT
PAIN_FUNCTIONAL_ASSESSMENT: 0-10
PAIN_FUNCTIONAL_ASSESSMENT: 0-10

## 2024-06-13 ASSESSMENT — PAIN SCALES - GENERAL
PAINLEVEL_OUTOF10: 0 - NO PAIN
PAINLEVEL_OUTOF10: 0 - NO PAIN

## 2024-06-13 NOTE — PROGRESS NOTES
Occupational Therapy    Occupational Therapy Treatment    Name: New Pierre  MRN: [id number]  : 1955  Date: 24  Time Calculation  Start Time: 1035  Stop Time: 1115  Time Calculation (min): 40 min     OT Therapeutic Procedures Time Entry  Manual Therapy Time Entry: 20  Neuromuscular Re-Education Time Entry: 10  Therapeutic Exercise Time Entry: 10                  Visit: 3  [employer]  -  [place]  [place]ic[place] cert: 24-24  Primary dx: R spastic hemiparesis    Assessment:    At last session, patient and caregiver reported patient has occasional pain in the L shoulder due to overuse of the functional arm. Today, I had patient complete exercises to address the patient's L shoulder soreness. Patient demonstrated good carryover of education.   Plan:   Continue with skilled OT POC     Subjective   Patient agreeable to treatment session. Was dropped off by a caregiver, however caregiver not present during session.  Precautions:   Fall risk  Pain Assessment:  Pain Assessment  Pain Assessment: 0-10  Pain Score: 0 - No pain    Objective    Manual Therapy: 20 mins  R scapular mobs: elevation/depression, abduction/adduction.  Manual passive stretch R elbow->distally    Neuro Re-ed: 10 mins  Sitting at table top with air splint on the RUE, completed towel slides with assist from the opposite arm and therapist for guiding support. Cues given to come forward with shoulders and trunk to complete 2 sets of 10 of the following: shoulder flexion, horizontal abduction/adduction.     Therapeutic Exercise: 10 mins  Educated patient on and had complete:  pendulums to the LUE sitting in w/c 20x each way  Towel slides on table top with just the LUE      OP EDUCATION:   Pendulums and towel slides for the LUE -given in handout form    Goals:  Active       OT Goals       Home program       Start:  24    Expected End:  24       Patient and caregivers will demonstrate understanding of established HEP which will  include optimal positioning, stretching, and splint wear and care to decrease risk of contracture and promote good hygiene of the RUE.

## 2024-06-13 NOTE — PROGRESS NOTES
Physical Therapy    Physical Therapy Treatment    Patient Name: New Pierre  MRN: [id number]  Today's Date: 6/5/24  Time Calculation  Start Time: 0945  Stop Time: 1030  Time Calculation (min): 45 min     PT Therapeutic Procedures Time Entry  Neuromuscular Re-Education Time Entry: 20  Therapeutic Exercise Time Entry: 20       Visit number: 4  [facility] [employer] [facility]  Plan of Care Dates: 5/6/24 - 8/4/24  Auth not required for [place] - Primary  Primary diagnosis: Impaired mobility      Assessment:  Patient tolerated all therapeutic activities fair with moderate to max fatigue. Continued to work on anterior wt shifting, upright posture in unsupported sitting, transfers, bed mobility, and standing tolerance to improve functional mobility. Patient continues to require cues and encouragement for anterior weight shifting with STS and stand pivot transfers. Patient is able to complete rolling Left to Right at supervision with L UE pulling on edge of mat. Patient required max A to stand from mat to hemicane with cues for upright posture and was tolerated <30 sec stand.    Plan: Bed mobility, Log-roll technique to get out of bed, commode transfers, standing tolerance       Current Problem  1. Impaired mobility  Follow Up In Physical Therapy      2. 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 Weakness of right lower extremity  Follow Up In Physical Therapy            Subjective    Patient's wife drove him to appt this date due to transportation issue. PT and OT assisted in car to w/c transfer. Transportation to pick patient up at end of sessions.    Precautions:    Fall Risk, Non-verbal    Pain   Denies pain, 0/10    Objective     Treatments:    THERAPEUTIC EXERCISE  - PROM R hip flexion/extension x 20  - PROM R knee flexion/extension x 20  - PROM R hip abd/add and IR/ER x 20  - Passive R 90/90 stretch 3 x 40 sec holds  - L hip abd x 20  - L knee  slides x 20   - L march x 20  - Seated L LAQ     THERAPEUTIC ACTIVITY   - Car to W/C transfer via squat pivot at Mod A x 2  - seated lumbar flexion x 10  - Rolling R x 10  - Rolling L x 6  - supine to sit at Mod A x 1  - mat to stand with hemicane at Max A x 1 with cues for upright posture *pt unable to tolerate <30 sec  - W/C to/from Mat at Mod A x 1 with cues for anterior wt shifting    *pt required frequent rest breaks between activities*    OP EDUCATION:  Access Code: [email or web address]  URL: [email or web address]  Date: 05/06/2024  Prepared by: Beth Ga    Exercises  - Supine Hamstring Stretch with Caregiver  - 1 x daily - 7 x weekly - 3 sets - 10 reps  - Supine Gastroc Stretch with Caregiver  - 1 x daily - 7 x weekly - 3 sets - 10 reps  - Hip and Knee Extension and Flexion Caregiver PROM  - 1 x daily - 7 x weekly - 3 sets - 10 reps  - Hip Abduction and Adduction Caregiver PROM  - 1 x daily - 7 x weekly - 3 sets - 10 reps  - Hip Flexion with Knee Extension Caregiver PROM  - 1 x daily - 7 x weekly - 3 sets - 10 reps  - Caregiver PROM Hip Internal External Rotation  - 1 x daily - 7 x weekly - 3 sets - 10 reps  - Foot Inversion and Eversion PROM Caregiver  - 1 x daily - 7 x weekly - 3 sets - 10 reps      Goals:  Active       PT Problem       PT Goal 1 (Progressing)       Start:  05/06/24    Expected End:  08/04/24       Patient will perform w/c to bed or mat transfer via stand or squat pivot at Mod Assist in order to decrease caregiver burden.         PT Goal 2 (Progressing)       Start:  05/06/24    Expected End:  08/04/24       Patient will perform sit to stand with LRAD at Min Assist to improve functional mobility         PT Goal 3 (Progressing)       Start:  05/06/24    Expected End:  08/04/24       Patient will improve Sarkar Balance by 4 pts to meet MDC for chronic stroke population and improve balance         PT Goal 4 (Progressing)       Start:  05/06/24    Expected End:  08/04/24       Patient will  "perform home program with assist of caregiver to improve ROM and maintain functional mobility         PT Goal 5 (Progressing)       Start:  05/06/24    Expected End:  08/04/24       Patient will perform all bed mobility at stand by assist to improve functional mobility and independence          Patient Stated Goal 1 (Progressing)       Start:  05/06/24    Expected End:  08/04/24       Per wife: \"be able to pivot better and take some steps\"              "

## 2024-06-13 NOTE — PROGRESS NOTES
Speech-Language Pathology    SLP Adult Outpatient Speech-Language Pathology Treatment     Patient Name: New Pierre  MRN: [id number]  Today's Date: 6/13/2024  Time Calculation  Start Time: 1115  Stop Time: 1200  Time Calculation (min): 45 min      Current Problem:   1. Apraxia following cerebrovascular accident        2. Aphasia following cerebral infarction            SLP Assessment:  SLP TX Intervention Outcome: Making Progress Towards Goals  Treatment Provided: Yes, see Objective for details  Treatment Tolerance: Patient tolerated treatment well      Plan:  SLP TX Plan: Continue Plan of Care  SLP Frequency: 1-2x per week  Discussed POC: Patient  Discussed Risks/Benefits: Yes, Patient  Patient/Caregiver Agreeable: Yes      Subjective   Patient well, arriving to treatment following PT/OT sessions. He attended the visit independently. Patient was unable to provide report beyond non-verbal head nod/shake of variable accuracy. Pain scale on his SGD was used so that patient could express that he did not have pain today.      General Visit Information:  Number of Authorized Treatments : Based on [place] ([place])  Total Number of Visits : 9      Pain Assessment:  Pain Assessment: 0-10  Pain Score: 0 - No pain      Objective     GOALS: Start date: 4/8/2024; End/re-eval date: 7/1/2024  By discharge:  LTG: Patient will comprehend communication and express information for basic wants/needs/ideas using total communication in order to increased participation in the home and community environments.    STGs:   1. Patient will perform auditory comprehension tasks (e.g. yes/no questions, etc.) using total communication with 90% accuracy given mild cueing.  > NOT ADDRESSED. Informally.    2. Patient will imitate oral movements for basic sound combinations (VC, CV, etc.) with or without vocal production in 9/10 opportunities given 1:1 verbal and visual models.  > ADDRESSED: Patient successfully imitated oral movements for vowels /i, ai,  "o, u/ with mod-max modeling and verbal cueing. He additionally achieved oral positioning for /f, n, l, m/ with mod-max modeling and cueing. Camera on patient's SGD was used as a \"mirror\" today for patient's self-monitoring of oral movement and accuracy, however, patient did better on some occasions without the visual cue which seemed distracting to patient. He performed CV syllables \"no, own, off\" No spontaneous productions or vocalizations observed today.     3. Patient will participate in further evaluation and exploration of reading comprehension and written expression with new goals to be added to POC as appropriate.  > GOAL MET.    4. Patient will perform reading comprehension tasks at the single word through single sentence levels using total communication with 80% accuracy given mild cueing.  > NOT ADDRESSED.    5. Patient will perform written expression tasks at the single word level with 80% accuracy given mild cueing.  > ADDRESSED: Patient copied the words \"New, Ignacio, Krypto\" today with moderate assist for finding correct letters on his SGD's keyboard. Patient was recommended to continue practice with these words at home.        Outpatient Education:  Individual(s) Educated: Patient  Verbal Education : Summary of session performance; HEP  Risk and Benefits Discussed with Patient/Caregiver/Other: yes  Patient/Caregiver Demonstrated Understanding: yes  Plan of Care Discussed and Agreed Upon: yes  Patient Response to Education: Patient/Caregiver Verbalized Understanding of Information, Patient/Caregiver Asked Appropriate Questions  "

## 2024-06-13 NOTE — CARE PLAN
Reviewed chart prior to [facility] patient outreach. Spoke with patient's spouse Zabrina. Patient is unable to communicate verbally on phone. Patient is doing well at time of outreach.     His wife works from home and provides patient's care. Home health aides to assist with patient's care twice daily. Patient goes to adult day care on Mondays and has showers at facility, and his wife goes to the office on Mondays. Yesterday, while at the facility patient was able to make a bracelet.      Zabrina reports that patient standing more with physical therapy, and continues to make marked improvements. Patient needs a brace to support his weaker leg. In process of getting brace through [facility]. Patient was measure and now a cast is being made. If [employer] needs additional information, please fax to #[phone] attn: Clyde. Zabrina stated that it has been mention to her by therapist that patient might benefit from botox injection in hands to help with spasticity. CM to follow-up.     Patient is going to outpatient speech therapy through . His wife has been impressed with the level of care and progress he has made since starting. They are working with him to form the words with his mouth, even if he sound does not come out. Zabrina reported that patient spoke 6 words last night.     Reviewed upcoming appointments with patient's wife. Notified that my last day with [facility] is on 6/28/24, and that another CM nurse will reach out to her next month. No other questions or concerns identified at this time. Patient will reach out as needed with any non-urgent questions or concerns.

## 2024-06-18 ENCOUNTER — TREATMENT (OUTPATIENT)
Dept: SPEECH THERAPY | Facility: CLINIC | Age: 69
End: 2024-06-18
Payer: COMMERCIAL

## 2024-06-18 ENCOUNTER — TREATMENT (OUTPATIENT)
Dept: OCCUPATIONAL THERAPY | Facility: CLINIC | Age: 69
End: 2024-06-18
Payer: COMMERCIAL

## 2024-06-18 ENCOUNTER — TREATMENT (OUTPATIENT)
Dept: PHYSICAL THERAPY | Facility: CLINIC | Age: 69
End: 2024-06-18
Payer: COMMERCIAL

## 2024-06-18 DIAGNOSIS — Z74.09 IMPAIRED MOBILITY: Primary | ICD-10-CM

## 2024-06-18 DIAGNOSIS — I63.132 CEREBRAL INFARCTION DUE TO EMBOLISM OF LEFT CAROTID ARTERY (MULTI): ICD-10-CM

## 2024-06-18 DIAGNOSIS — I69.390 APRAXIA FOLLOWING CEREBROVASCULAR ACCIDENT: ICD-10-CM

## 2024-06-18 DIAGNOSIS — R29.898 WEAKNESS OF RIGHT LOWER EXTREMITY: ICD-10-CM

## 2024-06-18 DIAGNOSIS — I69.320 APHASIA FOLLOWING CEREBRAL INFARCTION: ICD-10-CM

## 2024-06-18 DIAGNOSIS — G81.11 RIGHT SPASTIC HEMIPLEGIA (MULTI): Primary | ICD-10-CM

## 2024-06-18 PROCEDURE — 97110 THERAPEUTIC EXERCISES: CPT | Mod: GO,59

## 2024-06-18 PROCEDURE — 97530 THERAPEUTIC ACTIVITIES: CPT | Mod: GP

## 2024-06-18 PROCEDURE — 97110 THERAPEUTIC EXERCISES: CPT | Mod: GP

## 2024-06-18 PROCEDURE — 97112 NEUROMUSCULAR REEDUCATION: CPT | Mod: GO,59

## 2024-06-18 PROCEDURE — 97140 MANUAL THERAPY 1/> REGIONS: CPT | Mod: GO,59

## 2024-06-18 PROCEDURE — 92507 TX SP LANG VOICE COMM INDIV: CPT | Mod: GN

## 2024-06-18 ASSESSMENT — PAIN SCALES - GENERAL
PAINLEVEL_OUTOF10: 0 - NO PAIN
PAINLEVEL_OUTOF10: 0 - NO PAIN

## 2024-06-18 ASSESSMENT — PAIN - FUNCTIONAL ASSESSMENT
PAIN_FUNCTIONAL_ASSESSMENT: 0-10
PAIN_FUNCTIONAL_ASSESSMENT: 0-10

## 2024-06-18 NOTE — PROGRESS NOTES
"Speech-Language Pathology    SLP Adult Outpatient Speech-Language Pathology Treatment     Patient Name: New Pierre  MRN: [id number]  Today's Date: 6/18/2024  Time Calculation  Start Time: 0945  Stop Time: 1030  Time Calculation (min): 45 min      Current Problem:   1. Apraxia following cerebrovascular accident        2. Aphasia following cerebral infarction            SLP Assessment:  SLP TX Intervention Outcome: Making Progress Towards Goals  Treatment Provided: Yes, see Objective for details  Treatment Tolerance: Patient tolerated treatment well      Plan:  SLP TX Plan: Continue Plan of Care  SLP Frequency: 1-2x per week  Discussed POC: Patient  Discussed Risks/Benefits: Yes, Patient  Patient/Caregiver Agreeable: Yes      Subjective   Patient well, arriving to and attending treatment accompanied by his aide. Patient was unable to provide report beyond non-verbal head nod/shake of variable accuracy. Visual pain scale was used so that patient could express that he did not have pain today. He brought his [employer] SGD to today's visit.      General Visit Information:  Number of Authorized Treatments : Based on [place] ([place])  Total Number of Visits : 10      Pain Assessment:  Pain Assessment: 0-10  Pain Score: 0 - No pain      Objective     GOALS: Start date: 4/8/2024; End/re-eval date: 7/1/2024  By discharge:  LTG: Patient will comprehend communication and express information for basic wants/needs/ideas using total communication in order to increased participation in the home and community environments.    STGs:   1. Patient will perform auditory comprehension tasks (e.g. yes/no questions, etc.) using total communication with 90% accuracy given mild cueing.  > NOT ADDRESSED. Informally.    2. Patient will imitate oral movements for basic sound combinations (VC, CV, etc.) with or without vocal production in 9/10 opportunities given 1:1 verbal and visual models.  > ADDRESSED: VC combinations for words \"on, off, no\" " "were addressed. Patient successfully imitated movements for \"on, no\" with 1:1 verbal and visual modeling and feedback using a mirror. Patient had difficulty imitating consonant /f/ for \"off.\" No spontaneous productions or vocalizations observed today.       4. Patient will perform reading comprehension tasks at the single word through single sentence levels using total communication with 80% accuracy given mild cueing.  > NOT ADDRESSED.    5. Patient will perform written expression tasks at the single word level with 80% accuracy given mild cueing.  > ADDRESSED: Patient copied the words \"New, Ignacio\" today with mild-moderate assist for finding correct letters on his SGD's keyboard. He was quicker to find keys on the keyboard today in comparison to last 2 visits. Using Copy and Recall Treatment (CART) approach, patient copied \"New\" x3 and then typed word from memory. He correctly selected 3/5 letters independently, 5/5 give moderate cueing. Patient was recommended to continue practice with these words at home.        Outpatient Education:  Individual(s) Educated: Patient, aide  Verbal Education : Summary of session performance; HEP  Risk and Benefits Discussed with Patient/Caregiver/Other: yes  Patient/Caregiver Demonstrated Understanding: yes  Plan of Care Discussed and Agreed Upon: yes  Patient Response to Education: Patient/Caregiver Verbalized Understanding of Information, Patient/Caregiver Asked Appropriate Questions  "

## 2024-06-18 NOTE — PROGRESS NOTES
Occupational Therapy    Occupational Therapy Treatment    Name: New Pierre  MRN: [id number]  : 1955  Date: 24  Time Calculation  Start Time: 1030  Stop Time: 1113  Time Calculation (min): 43 min     OT Therapeutic Procedures Time Entry  Manual Therapy Time Entry: 20  Neuromuscular Re-Education Time Entry: 10  Therapeutic Exercise Time Entry: 10                  Visit: 4  MyCare  -  based on [place]  Medicare cert: 24-24  Primary dx: R spastic hemiparesis    Assessment:    Patient and patient's caregiver reported understanding RUE stretching/ROM exercises and splint wearing schedule. Report the stretches are very helpful, making it slightly easier with dressing and bathing the RUE.  Patient in agreement to continue with HEP with caregivers after discharge.    Plan:   Goals met. Discharge today.  Patient in agreement with plan.    Subjective   Patient agreeable to treatment session. Accompanied by caregiver. Caregiver reports the stretches are very helpful at home.    Precautions:   Fall risk  Pain Assessment:  Pain Assessment  Pain Assessment: 0-10  Pain Score: 0 - No pain    Objective    Manual Therapy: 20 mins  R scapular mobs: elevation/depression, abduction/adduction.  Manual passive stretch R elbow->distally    Neuro Re-ed: 10 mins  Sitting at table top with air splint on the RUE, completed towel slides with assist from the opposite arm and therapist for guiding support. Cues given to come forward with shoulders and trunk to complete 2 sets of 10 of the following: shoulder flexion, horizontal abduction/adduction.     Therapeutic Exercise: 10 mins  Educated patient on and had complete:  pendulums to the LUE sitting in w/c 20x each way  Towel slides on table top with just the LUE      OP EDUCATION:   Pendulums and towel slides for the LUE -went over again to demonstrate  for  caregiver   Continue with home stretching program    Goals:  Resolved       OT Goals       Home program (Met)        Start:  06/05/24    Expected End:  08/04/24    Resolved:  06/18/24    Patient and caregivers will demonstrate understanding of established HEP which will include optimal positioning, stretching, and splint wear and care to decrease risk of contracture and promote good hygiene of the RUE.

## 2024-06-18 NOTE — PROGRESS NOTES
Physical Therapy    Physical Therapy Treatment    Patient Name: New Pierre  MRN: [id number]  Today's Date: 6/5/24  Time Calculation  Start Time: 1115  Stop Time: 1156  Time Calculation (min): 41 min     PT Therapeutic Procedures Time Entry  Therapeutic Exercise Time Entry: 15  Therapeutic Activity Time Entry: 25       Visit number: 5  [facility] [employer] [facility]  Plan of Care Dates: 5/6/24 - 8/4/24  Auth not required for [place] - Primary  Primary diagnosis: Impaired mobility      Assessment:  Patient tolerated all therapeutic activities fair with max fatigue requiring several rest breaks in sitting. PT session focused on standing tolerance to assist in improving transfers and R LE mm facilitation with WB. Patient continues to require max cues for anterior wt shifting during transfers as well as max cues for upright posture in standing with use of visual feedback as well. Patient demonstrates decreased standing tolerance as he is able to stand for no longer than 1 minute at a time. Attempted standing in // bars with assist, shifting wt to R LE and tapping green foam with L LE, however, pt unable to complete more than 1 repetition. Patient required max encouragement to perform 4th stand this date and maintain standing for 1 min prior to sitting.     Plan: Bed mobility, Log-roll technique to get out of bed, commode transfers, standing tolerance       Current Problem  1. Impaired mobility  Follow Up In Physical Therapy      2. 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 Weakness of right lower extremity  Follow Up In Physical Therapy            Subjective    Patient's aide presents throughout session. She states they have been working on ant wt shifting and leaning at home. She notes that wife is on top of the leg stretches.     Precautions:    Fall Risk, Non-verbal    Pain   Denies pain, 0/10    Objective     Treatments:    THERAPEUTIC  EXERCISE  In seated:  - PROM R knee flexion/extension x 20  - Passive R HS and Gastroc stretch 3 x 40 sec holds  - L hip abd iso with therapist manual resistance x 20  - L LAQ x 20   - L march x 20  - Seated L LAQ     THERAPEUTIC ACTIVITY   - W/C to stand at // bars with L UE on chair or // bar x 4 at Mod A *cues for ant wt shift  - Standing at // bars with significant L UE support for 2 x 1 min at [facility] *pt with L trunk lean *mirror for visual feedback to encourage upright posture  - Standing at // bars with significant L UE support at min A: medial/lateral wt shifting x 30 ea  - Standing at // bars with significant L UE support at Min A: tapping green foam x 1 and partial attempt x 1 *pt unable to perform further reps d/t fatigue    *pt required frequent rest breaks between activities*      OP EDUCATION:  Access Code: [email or web address]  URL: [email or web address]  Date: 05/06/2024  Prepared by: Beth Ga    Exercises  - Supine Hamstring Stretch with Caregiver  - 1 x daily - 7 x weekly - 3 sets - 10 reps  - Supine Gastroc Stretch with Caregiver  - 1 x daily - 7 x weekly - 3 sets - 10 reps  - Hip and Knee Extension and Flexion Caregiver PROM  - 1 x daily - 7 x weekly - 3 sets - 10 reps  - Hip Abduction and Adduction Caregiver PROM  - 1 x daily - 7 x weekly - 3 sets - 10 reps  - Hip Flexion with Knee Extension Caregiver PROM  - 1 x daily - 7 x weekly - 3 sets - 10 reps  - Caregiver PROM Hip Internal External Rotation  - 1 x daily - 7 x weekly - 3 sets - 10 reps  - Foot Inversion and Eversion PROM Caregiver  - 1 x daily - 7 x weekly - 3 sets - 10 reps      Goals:  Active       PT Problem       PT Goal 1 (Progressing)       Start:  05/06/24    Expected End:  08/04/24       Patient will perform w/c to bed or mat transfer via stand or squat pivot at Mod Assist in order to decrease caregiver burden.         PT Goal 2 (Progressing)       Start:  05/06/24    Expected End:  08/04/24       Patient will perform sit to stand  "with LRAD at Min Assist to improve functional mobility         PT Goal 3 (Progressing)       Start:  05/06/24    Expected End:  08/04/24       Patient will improve Sarkar Balance by 4 pts to meet MDC for chronic stroke population and improve balance         PT Goal 4 (Progressing)       Start:  05/06/24    Expected End:  08/04/24       Patient will perform home program with assist of caregiver to improve ROM and maintain functional mobility         PT Goal 5 (Progressing)       Start:  05/06/24    Expected End:  08/04/24       Patient will perform all bed mobility at stand by assist to improve functional mobility and independence          Patient Stated Goal 1 (Progressing)       Start:  05/06/24    Expected End:  08/04/24       Per wife: \"be able to pivot better and take some steps\"              "

## 2024-06-20 ENCOUNTER — APPOINTMENT (OUTPATIENT)
Dept: OCCUPATIONAL THERAPY | Facility: CLINIC | Age: 69
End: 2024-06-20
Payer: COMMERCIAL

## 2024-06-20 ENCOUNTER — APPOINTMENT (OUTPATIENT)
Dept: SPEECH THERAPY | Facility: CLINIC | Age: 69
End: 2024-06-20
Payer: COMMERCIAL

## 2024-06-20 ENCOUNTER — APPOINTMENT (OUTPATIENT)
Dept: PHYSICAL THERAPY | Facility: CLINIC | Age: 69
End: 2024-06-20
Payer: COMMERCIAL

## 2024-06-24 RX ORDER — TRIAMCINOLONE ACETONIDE 1 MG/G
CREAM TOPICAL 2 TIMES DAILY
Qty: 30 G | Refills: 0 | Status: SHIPPED | OUTPATIENT
Start: 2024-06-24

## 2024-06-28 ENCOUNTER — DOCUMENTATION (OUTPATIENT)
Dept: PRIMARY CARE | Facility: CLINIC | Age: 69
End: 2024-06-28
Payer: COMMERCIAL

## 2024-06-28 NOTE — CARE PLAN
Handoff given to Nohemi Pierce (covering RN CM) and program documentation updated.  Letter mailed to patient with covering RN's contact information

## 2024-07-09 ENCOUNTER — TREATMENT (OUTPATIENT)
Dept: SPEECH THERAPY | Facility: CLINIC | Age: 69
End: 2024-07-09
Payer: COMMERCIAL

## 2024-07-09 ENCOUNTER — APPOINTMENT (OUTPATIENT)
Dept: PHYSICAL THERAPY | Facility: CLINIC | Age: 69
End: 2024-07-09
Payer: COMMERCIAL

## 2024-07-09 DIAGNOSIS — R29.898 WEAKNESS OF RIGHT LOWER EXTREMITY: ICD-10-CM

## 2024-07-09 DIAGNOSIS — I69.320 APHASIA FOLLOWING CEREBRAL INFARCTION: ICD-10-CM

## 2024-07-09 DIAGNOSIS — I63.132 CEREBRAL INFARCTION DUE TO EMBOLISM OF LEFT CAROTID ARTERY (MULTI): ICD-10-CM

## 2024-07-09 DIAGNOSIS — Z74.09 IMPAIRED MOBILITY: Primary | ICD-10-CM

## 2024-07-09 DIAGNOSIS — I69.390 APRAXIA FOLLOWING CEREBROVASCULAR ACCIDENT: ICD-10-CM

## 2024-07-09 PROCEDURE — 97110 THERAPEUTIC EXERCISES: CPT | Mod: GP

## 2024-07-09 PROCEDURE — 92507 TX SP LANG VOICE COMM INDIV: CPT | Mod: GN

## 2024-07-09 ASSESSMENT — PAIN - FUNCTIONAL ASSESSMENT: PAIN_FUNCTIONAL_ASSESSMENT: 0-10

## 2024-07-09 ASSESSMENT — PAIN SCALES - GENERAL: PAINLEVEL_OUTOF10: 0 - NO PAIN

## 2024-07-09 NOTE — PROGRESS NOTES
Physical Therapy    Physical Therapy Treatment    Patient Name: New Pierre  MRN: [id number]  Today's Date: 6/5/24  Time Calculation  Start Time: 1303  Stop Time: 1343  Time Calculation (min): 40 min     PT Therapeutic Procedures Time Entry  Therapeutic Exercise Time Entry: 30       Visit number: 6  Insurance: [place] and [employer]  Plan of Care Dates: 5/6/24 - 8/4/24  Auth not required for [place] - Primary  Primary diagnosis: Impaired mobility      Assessment:  Patient tolerated all therapeutic activities fairly and required max encouragement from therapist and HHA to perform therapeutic exercises this date. He declined to perform standing activities, mat exercises or sitting EOB activities for balance. HE was agreeable to seated in w/c exercises following discussion at length on need to participate in therapy uin order to continue and to have therapeutic effect. PT session focused on L LE stretch, R LE strengthening, and UE reaching for forward lean.    Plan: Bed mobility, Log-roll technique to get out of bed, commode transfers, standing tolerance       Current Problem  1. Impaired mobility  Follow Up In Physical Therapy      2. 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 Weakness of right lower extremity  Follow Up In Physical Therapy            Subjective    Patient's aide presents throughout session. She states they have been working on ant wt shifting and leaning at home. She notes that wife is on top of the leg stretches.     Precautions:    Fall Risk, Non-verbal    Pain   Denies pain, 0/10    Objective     Treatments:    THERAPEUTIC EXERCISE  In seated:  - PROM R knee flexion/extension x 10  - Passive R HS and Gastroc stretch 3 x 40 sec holds  - L hip abd x 20  - L LAQ with 2.5lb ankle wt 2 x 10  - L march with 2.5lb ankle wt 2 x 10  - L HS curl with green TB x 20  - Forward trunk lean with SB roll out x 10  - Reaching for 6 cones and  stacking them on top of each other on mat table to R side   - In sitting Kicking soccer ball with L LE x 3 min      OP EDUCATION:  Access Code: [email or web address]  URL: [email or web address]  Date: 05/06/2024  Prepared by: Beth Ga    Exercises  - Supine Hamstring Stretch with Caregiver  - 1 x daily - 7 x weekly - 3 sets - 10 reps  - Supine Gastroc Stretch with Caregiver  - 1 x daily - 7 x weekly - 3 sets - 10 reps  - Hip and Knee Extension and Flexion Caregiver PROM  - 1 x daily - 7 x weekly - 3 sets - 10 reps  - Hip Abduction and Adduction Caregiver PROM  - 1 x daily - 7 x weekly - 3 sets - 10 reps  - Hip Flexion with Knee Extension Caregiver PROM  - 1 x daily - 7 x weekly - 3 sets - 10 reps  - Caregiver PROM Hip Internal External Rotation  - 1 x daily - 7 x weekly - 3 sets - 10 reps  - Foot Inversion and Eversion PROM Caregiver  - 1 x daily - 7 x weekly - 3 sets - 10 reps      Goals:  Active       PT Problem       PT Goal 1 (Progressing)       Start:  05/06/24    Expected End:  08/04/24       Patient will perform w/c to bed or mat transfer via stand or squat pivot at Mod Assist in order to decrease caregiver burden.         PT Goal 2 (Progressing)       Start:  05/06/24    Expected End:  08/04/24       Patient will perform sit to stand with LRAD at Min Assist to improve functional mobility         PT Goal 3 (Progressing)       Start:  05/06/24    Expected End:  08/04/24       Patient will improve Sarkar Balance by 4 pts to meet MDC for chronic stroke population and improve balance         PT Goal 4 (Progressing)       Start:  05/06/24    Expected End:  08/04/24       Patient will perform home program with assist of caregiver to improve ROM and maintain functional mobility         PT Goal 5 (Progressing)       Start:  05/06/24    Expected End:  08/04/24       Patient will perform all bed mobility at stand by assist to improve functional mobility and independence          Patient Stated Goal 1 (Progressing)        "Start:  05/06/24    Expected End:  08/04/24       Per wife: \"be able to pivot better and take some steps\"              "

## 2024-07-10 NOTE — PROGRESS NOTES
Speech-Language Pathology    SLP Adult Outpatient Speech-Language Pathology Treatment     Patient Name: New Pierre  MRN: [id number]  Today's Date: 7/9/2024  Time Calculation  Start Time: 0145  Stop Time: 0230  Time Calculation (min): 45 min      Current Problem:          ICD-10-CM    Aphasia following cerebral infarction I69.320    Apraxia following cerebrovascular accident I69.390       SLP Assessment:  SLP TX Intervention Outcome: Making Progress Towards Goals  Treatment Provided: Yes, see Objective for details  Treatment Tolerance: Patient tolerated treatment well      Plan:  SLP TX Plan: Continue Plan of Care  SLP Frequency: 1-2x per week  Discussed POC: Patient  Discussed Risks/Benefits: Yes, Patient  Patient/Caregiver Agreeable: Yes      Subjective   Patient arrived to and attended treatment accompanied by his aide. Aide reported that patient has recently been sick with a cold, and patient performed limited maneuvers in PT today. Patient was unable to provide report beyond non-verbal head nod/shake of variable accuracy. He [facility] to today's visit. Patient's wife sent a letter to therapist with updates on patient's recent activity, including that he is verbalizing more at home and she and all of patient's aides have been working on his communication skills, including vocalizations/mouthing letters and copying words on his SGD's keyboard.      General Visit Information:  Number of Authorized Treatments : Based on MN (Shea)  Total Number of Visits : 11      Pain Assessment:  Pain Assessment: 0-10  Pain Score: 0 - No pain      Objective     GOALS: Start date: 7/9/2024; End/re-eval date: 10/7/2024  By discharge:  LTG: Patient will comprehend communication and express information for basic wants/needs/ideas using total communication in order to increased participation in the home and community environments.    STGs:   1. Patient will perform auditory comprehension tasks (e.g. yes/no  "questions, etc.) using total communication with 90% accuracy given mild cueing.  > NOT ADDRESSED/ONGOING. Informally.    2. Patient will imitate oral movements for basic sound combinations (VC, CV, etc.) with or without vocal production in 9/10 opportunities given 1:1 verbal and visual models.  > NOT ADDRESSED/ONGOING.    3. Patient will perform reading comprehension tasks at the single word through single sentence levels using total communication with 80% accuracy given mild cueing.  > NOT ADDRESSED/ONGOING.    4. Patient will perform written expression tasks at the single word level with 80% accuracy given mild cueing.  > ADDRESSED/ONGOING: Patient copied the words \"New, Ignacio, Zabrina, Krypto, Ivana\" today with errorless learning for first trial and addl mild-moderate assist for finding correct letters on his SGD's keyboard for subsequent trials. As activity progressed, he was quicker to find correct keys on the keyboard. Using Copy and Recall Treatment (CART) approach, patient copied \"New\" x3 and then typed word from memory. He correctly selected 2/5 letters independently, 5/5 given moderate cueing. Patient was recommended to continue practice with these words at home.        Outpatient Education:  Individual(s) Educated: Patient, aide  Verbal Education : Summary of session performance; HEP  Risk and Benefits Discussed with Patient/Caregiver/Other: yes  Patient/Caregiver Demonstrated Understanding: yes  Plan of Care Discussed and Agreed Upon: yes  Patient Response to Education: Patient/Caregiver Verbalized Understanding of Information, Patient/Caregiver Asked Appropriate Questions  "

## 2024-07-16 ENCOUNTER — TREATMENT (OUTPATIENT)
Dept: PHYSICAL THERAPY | Facility: CLINIC | Age: 69
End: 2024-07-16
Payer: COMMERCIAL

## 2024-07-16 ENCOUNTER — TREATMENT (OUTPATIENT)
Dept: SPEECH THERAPY | Facility: CLINIC | Age: 69
End: 2024-07-16
Payer: COMMERCIAL

## 2024-07-16 ENCOUNTER — PATIENT OUTREACH (OUTPATIENT)
Dept: PRIMARY CARE | Facility: CLINIC | Age: 69
End: 2024-07-16
Payer: MEDICARE

## 2024-07-16 DIAGNOSIS — I69.351 HEMIPLEGIA AND HEMIPARESIS FOLLOWING CEREBRAL INFARCTION AFFECTING RIGHT DOMINANT SIDE (MULTI): ICD-10-CM

## 2024-07-16 DIAGNOSIS — I69.320 APHASIA FOLLOWING CEREBRAL INFARCTION: ICD-10-CM

## 2024-07-16 DIAGNOSIS — Z74.09 IMPAIRED MOBILITY: Primary | ICD-10-CM

## 2024-07-16 DIAGNOSIS — I69.390 APRAXIA FOLLOWING CEREBROVASCULAR ACCIDENT: ICD-10-CM

## 2024-07-16 DIAGNOSIS — I63.132 CEREBRAL INFARCTION DUE TO EMBOLISM OF LEFT CAROTID ARTERY (MULTI): ICD-10-CM

## 2024-07-16 DIAGNOSIS — R53.1 GENERALIZED WEAKNESS: ICD-10-CM

## 2024-07-16 DIAGNOSIS — I10 PRIMARY HYPERTENSION: ICD-10-CM

## 2024-07-16 DIAGNOSIS — R29.898 WEAKNESS OF RIGHT LOWER EXTREMITY: ICD-10-CM

## 2024-07-16 PROCEDURE — 97530 THERAPEUTIC ACTIVITIES: CPT | Mod: GP

## 2024-07-16 PROCEDURE — 92507 TX SP LANG VOICE COMM INDIV: CPT | Mod: GN

## 2024-07-16 ASSESSMENT — PAIN SCALES - GENERAL: PAINLEVEL_OUTOF10: 0 - NO PAIN

## 2024-07-16 ASSESSMENT — PAIN - FUNCTIONAL ASSESSMENT: PAIN_FUNCTIONAL_ASSESSMENT: 0-10

## 2024-07-16 NOTE — PROGRESS NOTES
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 Patient was unable to provide report beyond non-verbal head nod/shake of variable accuracy. He brought his [facility] SGD to today's visit. Patient's wife sent a letter to therapist with updates on patient, indicating that [facility] would [facility] and a [facility] Stroke Support group in August.      General Visit Information:  Number of Authorized Treatments : Based on MN (She[place])  Total Number of Visits : 12      Pain Assessment:  Pain Assessment: 0-10  Pain Score: 0 - No pain      Objective     GOALS: Start date: 7/9/2024; End/re-eval date: 10/7/2024  By discharge:  LTG: Patient will comprehend communication and express information for basic wants/needs/ideas using total communication in order to increased participation in the home and community environments.    STGs:   1. Patient will perform auditory comprehension tasks (e.g. yes/no questions, etc.) using total communication with 90% accuracy given mild cueing.  > NOT ADDRESSED/ONGOING. Informally.    2. Patient will imitate oral movements for basic sound combinations (VC, CV, etc.) with or without vocal  "production in 9/10 opportunities given 1:1 verbal and visual models.  > ADDRESSED/ONGOING: Therapy activities tab of patient's [facility] SGD was used for oral movement tasks today, both with and without speech production. Patient imitated with approx 50% accuracy given max assist.     3. Patient will perform reading comprehension tasks at the single word through single sentence levels using total communication with 80% accuracy given mild cueing.  > ADDRESSED/ONGOING: Patient matched single words with pictures (from f/o 4 pictures) with 70% accuracy independently (60%->80%). He identified written words from oral word reading with 75% accuracy independently (60%->90%).    4. Patient will perform written expression tasks at the single word level with 80% accuracy given mild cueing.  > ADDRESSED/ONGOING: Patient quickly and independently navigated to his SGD's keyboard function. He copied the word \"New\" today x3 with mild-moderate assist for finding correct letters on his SGD's keyboard. Following copying portion of CART task, patient typed \"New\" from memory with moderate assist, correctly typing 3/5 letters independently, 5/5 with assist. Patient was recommended to continue practice at home.        Outpatient Education:  Individual(s) Educated: Patient, aide  Verbal Education : Summary of session performance; HEP  Risk and Benefits Discussed with Patient/Caregiver/Other: yes  Patient/Caregiver Demonstrated Understanding: yes  Plan of Care Discussed and Agreed Upon: yes  Patient Response to Education: Patient/Caregiver Verbalized Understanding of Information, Patient/Caregiver Asked Appropriate Questions  "

## 2024-07-16 NOTE — PROGRESS NOTES
"Physical Therapy    Physical Therapy Treatment    Patient Name: New Pierre  MRN: [id number]  Today's Date: 6/5/24  Time Calculation  Start Time: 1458  Stop Time: 1528  Time Calculation (min): 30 min     PT Therapeutic Procedures Time Entry  Therapeutic Activity Time Entry: 30       Visit number: 7  Insurance: [place] and [employer]  Plan of Care Dates: 5/6/24 - 8/4/24  Auth not required for [place] - Primary  Primary diagnosis: Impaired mobility      Assessment:  Patient performed 2 stands in // bars at Min A with L UE pulling at bar. He tolerated 2 stands for about 60-90 sec each prior to initiating sitting voluntarily. Following the 2 stands, patient declined further standing activities and only agreeable to seated exercises. Discussed with patient importance of participation in therapy sessions in order to optimize rehab potential as patient declined to perform greater than 2 stands during this session and no stands last session. Patient was asked if he wants to participate in PT with focus on standing and trial ambulation, he answered \"No\" via his communication tablet. This therapist to communicate with patient's wife regarding the patient declining to perform additional standing therapeutic activities. Patient progress is impacted as patient has the right to decline and refuse progressive standing exercises despite max therapist encouragement during last  two sessions.    Plan: Continue per plan of care.  commode transfers? standing tolerance, wt shifting, taps       Current Problem  1. Impaired mobility  Follow Up In Physical Therapy      2. 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 Weakness of right lower extremity  Follow Up In Physical Therapy              Subjective  Pt arrives 15 min late to session and is accommodated.  Patient's aide present throughout session.     Precautions:    Fall Risk, Non-verbal    Pain   Denies pain, " 0/10    Objective     Treatments:    THERAPEUTIC ACTIVITY  In // bar with UE support:  2 - W/C <> stand with L UE pulling up at Min A x 1  Standing with L UE support at CGA x 40-60 sec  Standing with L UE support at CGA Medial/Lateral Wt shifting x 10 R/L  Standing with L UE support at Min A x 1 toe taps with L LE x 10  Standing with L UE support at Mod A x 1 L LE step onto green foam and back x 1     Seated passive R HS stretch 2 x 40 sec hold  Seated march in place x 20 L  Seated LAQ x 20 L  Seated hip adduction with ball x 20 L  Seated hip abduction with yellow TB x 20 L      OP EDUCATION:  Access Code: [email or web address]  URL: [email or web address]  Date: 05/06/2024  Prepared by: Beth Ga    Exercises  - Supine Hamstring Stretch with Caregiver  - 1 x daily - 7 x weekly - 3 sets - 10 reps  - Supine Gastroc Stretch with Caregiver  - 1 x daily - 7 x weekly - 3 sets - 10 reps  - Hip and Knee Extension and Flexion Caregiver PROM  - 1 x daily - 7 x weekly - 3 sets - 10 reps  - Hip Abduction and Adduction Caregiver PROM  - 1 x daily - 7 x weekly - 3 sets - 10 reps  - Hip Flexion with Knee Extension Caregiver PROM  - 1 x daily - 7 x weekly - 3 sets - 10 reps  - Caregiver PROM Hip Internal External Rotation  - 1 x daily - 7 x weekly - 3 sets - 10 reps  - Foot Inversion and Eversion PROM Caregiver  - 1 x daily - 7 x weekly - 3 sets - 10 reps      Goals:  Active       PT Problem       PT Goal 1 (Progressing)       Start:  05/06/24    Expected End:  08/04/24       Patient will perform w/c to bed or mat transfer via stand or squat pivot at Mod Assist in order to decrease caregiver burden.         PT Goal 2 (Progressing)       Start:  05/06/24    Expected End:  08/04/24       Patient will perform sit to stand with LRAD at Min Assist to improve functional mobility         PT Goal 3 (Progressing)       Start:  05/06/24    Expected End:  08/04/24       Patient will improve Sarkar Balance by 4 pts to meet MDC for chronic stroke  "population and improve balance         PT Goal 4 (Progressing)       Start:  05/06/24    Expected End:  08/04/24       Patient will perform home program with assist of caregiver to improve ROM and maintain functional mobility         PT Goal 5 (Progressing)       Start:  05/06/24    Expected End:  08/04/24       Patient will perform all bed mobility at stand by assist to improve functional mobility and independence          Patient Stated Goal 1 (Progressing)       Start:  05/06/24    Expected End:  08/04/24       Per wife: \"be able to pivot better and take some steps\"                "

## 2024-07-16 NOTE — PROGRESS NOTES
SPOKE WITH WIFE LOUIS. AYDEN CONTINUES TO MAKE GAINS. SHE SENT LETTERS INQUIRING ABOUT BOTOX AND HID LEG BRACE. SHE IS AWAITING RESPONSE.  AYDEN AT THIS TIME HAS A COLD VIRUS. + MUCUS. SHE HAS HIM DRINKING HOT TEA. SHE CONTINUES TO MAKE SURE SHE CHANGES HIS POSITIONS . HE CAN NOW STAND 4 MINUTES. HE IS READY TO START SOME AMBULATION.    VSS CHECKED BY ems    Plan to call back in 2 weeks to assist with needs.

## 2024-07-23 ENCOUNTER — TREATMENT (OUTPATIENT)
Dept: PHYSICAL THERAPY | Facility: CLINIC | Age: 69
End: 2024-07-23
Payer: COMMERCIAL

## 2024-07-23 ENCOUNTER — TELEPHONE (OUTPATIENT)
Dept: NEUROLOGY | Facility: CLINIC | Age: 69
End: 2024-07-23

## 2024-07-23 ENCOUNTER — PATIENT OUTREACH (OUTPATIENT)
Dept: PRIMARY CARE | Facility: CLINIC | Age: 69
End: 2024-07-23
Payer: MEDICARE

## 2024-07-23 ENCOUNTER — TREATMENT (OUTPATIENT)
Dept: SPEECH THERAPY | Facility: CLINIC | Age: 69
End: 2024-07-23
Payer: COMMERCIAL

## 2024-07-23 DIAGNOSIS — R29.898 WEAKNESS OF RIGHT LOWER EXTREMITY: ICD-10-CM

## 2024-07-23 DIAGNOSIS — I63.132 CEREBRAL INFARCTION DUE TO EMBOLISM OF LEFT CAROTID ARTERY (MULTI): ICD-10-CM

## 2024-07-23 DIAGNOSIS — I69.390 APRAXIA FOLLOWING CEREBROVASCULAR ACCIDENT: Primary | ICD-10-CM

## 2024-07-23 DIAGNOSIS — Z74.09 IMPAIRED MOBILITY: Primary | ICD-10-CM

## 2024-07-23 DIAGNOSIS — I69.320 APHASIA FOLLOWING CEREBRAL INFARCTION: ICD-10-CM

## 2024-07-23 PROCEDURE — 97530 THERAPEUTIC ACTIVITIES: CPT | Mod: GP

## 2024-07-23 PROCEDURE — 92507 TX SP LANG VOICE COMM INDIV: CPT | Mod: GN

## 2024-07-23 ASSESSMENT — PAIN SCALES - GENERAL: PAINLEVEL_OUTOF10: 0 - NO PAIN

## 2024-07-23 ASSESSMENT — PAIN - FUNCTIONAL ASSESSMENT: PAIN_FUNCTIONAL_ASSESSMENT: 0-10

## 2024-07-23 NOTE — PROGRESS NOTES
Was notified by e-mail they would like to speak with me . Call attempted left voice mail with contact information.

## 2024-07-23 NOTE — PROGRESS NOTES
Physical Therapy    Physical Therapy Treatment    Patient Name: New Pierre  MRN: [id number]  Today's Date: 6/5/24  Time Calculation  Start Time: 1348  Stop Time: 1428  Time Calculation (min): 40 min     PT Therapeutic Procedures Time Entry  Therapeutic Activity Time Entry: 38       Visit number: 8  Insurance: [place] and [facility]  Plan of Care Dates: 5/6/24 - 8/4/24  Auth not required for [place] - Primary  Primary diagnosis: Impaired mobility      Assessment:  Patient demonstrated improved participation with encouragement and tolerance to standing activities during the session, although he required prolonged seated rest breaks between stands due to fatigue. New completed a total of 5 stands in the // bars with L UE pulling to stand at CGA. He continues to stand with increased WB on L LE and decreased WB on R LE which limits wt shifting and taps onto green foam. Therefore, sessions have been focused on increasing R LE WB to facilitate mm activation, wt shifting, and standing confidence in preparation for potential R LE brace which measurements have been completed by prior to start of this therapy POC. He continues to be limited by decreased activity tolerance segun for standing activities.    Plan: Continue per plan of care.  Reassess next visit       Current Problem  1. Impaired mobility  Follow Up In Physical Therapy      2. 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 Weakness of right lower extremity  Follow Up In Physical Therapy          Subjective    Patient's aide present throughout session.     Precautions:    Fall Risk, Non-verbal    Pain   Denies pain, 0/10    Objective     Treatments:    THERAPEUTIC ACTIVITY  In // bar with UE support:  5 - W/C <> stand with L UE pulling up at CGA x 1  Standing with L UE support at Min A x 1 L LE step onto green foam and back x 1   Standing with L UE support at CGA 2 x 40 sec  Standing with L UE support  at CGA Medial/Lateral Wt shifting x 10 R/L  Standing with L UE support at CGA x 1 toe taps with L LE x 8    Seated passive HS stretch 3 x 40 sec hold R/L  Seated march in place with red TB 3 x 10 L  Seated LAQ with red TB x 30 L  Seated hip abduction with red TB x 30 L      OP EDUCATION:  Access Code: [email or web address]  URL: [email or web address]  Date: 05/06/2024  Prepared by: Beth Ga    Exercises  - Supine Hamstring Stretch with Caregiver  - 1 x daily - 7 x weekly - 3 sets - 10 reps  - Supine Gastroc Stretch with Caregiver  - 1 x daily - 7 x weekly - 3 sets - 10 reps  - Hip and Knee Extension and Flexion Caregiver PROM  - 1 x daily - 7 x weekly - 3 sets - 10 reps  - Hip Abduction and Adduction Caregiver PROM  - 1 x daily - 7 x weekly - 3 sets - 10 reps  - Hip Flexion with Knee Extension Caregiver PROM  - 1 x daily - 7 x weekly - 3 sets - 10 reps  - Caregiver PROM Hip Internal External Rotation  - 1 x daily - 7 x weekly - 3 sets - 10 reps  - Foot Inversion and Eversion PROM Caregiver  - 1 x daily - 7 x weekly - 3 sets - 10 reps      Goals:  Active       PT Problem       PT Goal 1 (Progressing)       Start:  05/06/24    Expected End:  08/04/24       Patient will perform w/c to bed or mat transfer via stand or squat pivot at Mod Assist in order to decrease caregiver burden.         PT Goal 2 (Progressing)       Start:  05/06/24    Expected End:  08/04/24       Patient will perform sit to stand with LRAD at Min Assist to improve functional mobility         PT Goal 3 (Progressing)       Start:  05/06/24    Expected End:  08/04/24       Patient will improve Sarkar Balance by 4 pts to meet MDC for chronic stroke population and improve balance         PT Goal 4 (Progressing)       Start:  05/06/24    Expected End:  08/04/24       Patient will perform home program with assist of caregiver to improve ROM and maintain functional mobility         PT Goal 5 (Progressing)       Start:  05/06/24    Expected End:  08/04/24     "   Patient will perform all bed mobility at stand by assist to improve functional mobility and independence          Patient Stated Goal 1 (Progressing)       Start:  05/06/24    Expected End:  08/04/24       Per wife: \"be able to pivot better and take some steps\"                  "

## 2024-07-25 ENCOUNTER — PATIENT OUTREACH (OUTPATIENT)
Dept: PRIMARY CARE | Facility: CLINIC | Age: 69
End: 2024-07-25
Payer: MEDICARE

## 2024-07-30 ENCOUNTER — TREATMENT (OUTPATIENT)
Dept: SPEECH THERAPY | Facility: CLINIC | Age: 69
End: 2024-07-30
Payer: COMMERCIAL

## 2024-07-30 ENCOUNTER — TREATMENT (OUTPATIENT)
Dept: PHYSICAL THERAPY | Facility: CLINIC | Age: 69
End: 2024-07-30
Payer: COMMERCIAL

## 2024-07-30 DIAGNOSIS — I63.132 CEREBRAL INFARCTION DUE TO EMBOLISM OF LEFT CAROTID ARTERY (MULTI): ICD-10-CM

## 2024-07-30 DIAGNOSIS — R29.898 WEAKNESS OF RIGHT LOWER EXTREMITY: ICD-10-CM

## 2024-07-30 DIAGNOSIS — I69.390 APRAXIA FOLLOWING CEREBROVASCULAR ACCIDENT: ICD-10-CM

## 2024-07-30 DIAGNOSIS — Z74.09 IMPAIRED MOBILITY: Primary | ICD-10-CM

## 2024-07-30 DIAGNOSIS — I69.320 APHASIA FOLLOWING CEREBRAL INFARCTION: ICD-10-CM

## 2024-07-30 PROCEDURE — 92507 TX SP LANG VOICE COMM INDIV: CPT | Mod: GN

## 2024-07-30 PROCEDURE — 97530 THERAPEUTIC ACTIVITIES: CPT | Mod: GP

## 2024-07-30 ASSESSMENT — BALANCE ASSESSMENTS
STANDING UNSUPPORTED WITH EYES CLOSED: NEEDS HELP TO KEEP FROM FALLING
SITTING WITH BACK UNSUPPORTED BUT FEET SUPPORTED ON FLOOR OR ON A STOOL: ABLE TO SIT SAFELY AND SECURELY FOR 2 MINUTES
STANDING TO SITTING: NEEDS MODERATE OR MAXIMAL ASSIST TO STAND
TURN 360 DEGREES: NEEDS ASSISTANCE WHILE TURNING
TRANSFERS: NEEDS ONE PERSON TO ASSIST
PLACE ALTERNATE FOOT ON STEP OR STOOL WHILE STANDING UNSUPPORTED: NEEDS ASSIST TO KEEP FROM LOSING BALANCE OR FALLING
REACHING FORWARD WITH OUTSTRETCHED ARM WHILE STANDING: LOSES BALANCE WHILE TRYING/REQUIRES EXTERNAL SUPPORT
PICK UP OBJECT FROM THE FLOOR FROM A STANDING POSITION: UNABLE TO TRY/NEEDS ASSIST TO KEEP FROM LOSING BALANCE OR FALLING
STANDING ON ONE LEG: UNABLE TO TRY NEEDS ASSIST TO PREVENT FALL
PLACE ALTERNATE FOOT ON STEP OR STOOL WHILE STANDING UNSUPPORTED: NEEDS ASSISTANCE TO KEEP FROM FALLING/UNABLE TO TRY
STANDING UNSUPPORTED ONE FOOT IN FRONT: LOSES BALANCE WHILE STEPPING OR STANDING
LONG VERSION TOTAL SCORE (MAX 56): 5
STANDING TO SITTING: NEEDS ASSIST TO SIT
STANDING UNSUPPORTED: UNABLE TO STAND 30 SECONDS UNSUPPORTED
STANDING UNSUPPORTED WITH FEET TOGETHER: NEEDS HELP TO ATTAIN POSITION AND UNABLE TO HOLD FOR 15 SECONDS

## 2024-07-30 ASSESSMENT — PAIN SCALES - GENERAL: PAINLEVEL_OUTOF10: 0 - NO PAIN

## 2024-07-30 ASSESSMENT — PAIN - FUNCTIONAL ASSESSMENT: PAIN_FUNCTIONAL_ASSESSMENT: 0-10

## 2024-07-30 NOTE — PROGRESS NOTES
Physical Therapy    Physical Therapy Treatment and Discharge    Patient Name: New Pierre  MRN: [id number]  Today's Date: 6/5/24  Time Calculation  Start Time: 1303  Stop Time: 1343  Time Calculation (min): 40 min     PT Therapeutic Procedures Time Entry  Therapeutic Activity Time Entry: 40       Visit number: 9  Ins[facility]: [place] [facility] [employer] [place]  Plan of Care Dates: 5/6/24 - 8/4/24  Auth not required for [place] - Primary  Primary diagnosis: Impaired mobility    Assessment:  New Pierre has completed 9 sessions of physical therapy treatment with emphasis upon transfers, functional mobility, strength, standing tolerance, balance training. Patient demonstrates improved wheelchair to mat transfers via squat pivot allowing for decreased caregiver burden. He has demonstrated improved tolerance to standing as he is able to stand for ~80 sec prior to initiating sit. He has failed to make significant gains in functional mobility with skilled OP PT at this time based on MURILLO outcome measure and functional mobility assessment. New Pierre has met 2/6 PT POC goals at this time and has not met 4/6 PT POC goals as significant change has not been demonstrated based on functional mobility or outcome measures. Patient progress with OP physical therapy has been limited due to chronicity of stroke and increased patient fatigue with therapeutic activities. Patient is able to continue with his HEP with caregiver assist for further progress/management. Therefore, skilled PT services are no longer warranted/necessary at this time prior to receiving custom AFO for functional mobility training. New Pierre to be discharged from PT services and back to care under referring physician. New Pierre expressed agreement and satisfaction with this plan.       Plan: Discharge from OP PT to home program with assistance       Current Problem  1. Impaired mobility  Follow Up In Physical Therapy      2. Cerebral infarction due to  embolism of left carotid artery (Multi)  Follow Up In Physical Therapy      3. Weakness of right lower extremity  Follow Up In Physical Therapy          Subjective    Patient's aide present throughout session.     Precautions:    Fall Risk, Non-verbal    Pain   Denies pain, 0/10    Objective   STRENGTH:  Impaired R LE hemiplegia  Muscle Right LE Left LE   Hip Flexion (L1-L2) 0/5 4/5   Hip Abduction 0/5 4+/5   Knee Flexion (L3) 0/5 4/5   Knee Extension (L4) 0/5 4+/5   Ankle Dorsiflexion (L5) 0/5 5/5   Ankle Plantarflexion (S1) 0/5 4/5     NEUROMUSCULAR SYSTEM:      OBSERVATION: R UE flexor synergy      TRANSFERS:       SIT <> STAND: Mod A x 1 with enrike-cane   W/C <> Mat: CGA x 1 via squat pivot    SIT<> STAND at // Bars:       BED MOBILITY:       Rolling R: SBA with pt directing need for R LE placement    Rolling L: SBA with pt directing need for R LE placement               Sit to Supine: Min A x 1 at R LE through sidelying   Supine to sit: Mod A x 1 through sidelying      BALANCE:    SITTING: Good without UE support   STANDING: Poor, Requires Mod A x 1 and Enrike-cane to maintain standing for ~30 sec      Endurance: Patient able to  // bars with L UE support at [facility] for 70 sec      GAIT:  NT d/t safety concerns and pt declining    Outcome Measures:  Sarkar Balance Scale  1. Sitting to Standing: Needs moderate or maximal assist to stand  2. Standing Unsupported: Unable to stand 30 seconds unsupported  3. Sitting with Back Unsupported but Feet Supported on Floor or on a Stool: Able to sit safely and securely for 2 minutes  4. Standing to Sitting: Needs assist to sit  5.  Transfers: Needs one person to assist  6. Standing Unsupported with Eyes Closed: Needs help to keep from falling  7. Standing Unsupported with Feet Together: Needs help to attain position and unable to hold for 15 seconds  8. Reach Forward with Outstretched Arm While Standing: Loses balance while trying/requires external support  9.  Object  from Floor from a Standing Position: Unable to try/needs assist to keep from losing balance or falling  10. Turning to Look Behind Over Left and Right Shoulders While Standing: Needs assist to keep from losing balance or falling  11. Turn 360 Degrees: Needs assistance while turning  12. Place Alternate Foot on Step or Stool While Standing Unsupported: Needs assistance to keep from falling/unable to try  13. Standing Unsupported One Foot in Front: Loses balance while stepping or standing  14. Standing on One Leg: Unable to try needs assist to prevent fall  Sarkar Balance Score: 5    Treatments:    THERAPEUTIC ACTIVITY  Assessment of pt's POC goals completed today- see objective, goals, and assessment section. Educated pt regarding results of examination findings and discussed next steps in POC progression. Educated pt regarding importance of continuing with good HEP compliance for optimal rehab results.    In // bar with UE support:  2 - W/C <> stand with L UE pulling up at CGA x 1  Standing with L UE support at CGA x 70 sec  Standing with L UE support at CGA x 80 sec      OP EDUCATION:  Access Code: [email or web address]  URL: [email or web address]  Date: 05/06/2024  Prepared by: Beth Ga    Exercises  - Supine Hamstring Stretch with Caregiver  - 1 x daily - 7 x weekly - 3 sets - 10 reps  - Supine Gastroc Stretch with Caregiver  - 1 x daily - 7 x weekly - 3 sets - 10 reps  - Hip and Knee Extension and Flexion Caregiver PROM  - 1 x daily - 7 x weekly - 3 sets - 10 reps  - Hip Abduction and Adduction Caregiver PROM  - 1 x daily - 7 x weekly - 3 sets - 10 reps  - Hip Flexion with Knee Extension Caregiver PROM  - 1 x daily - 7 x weekly - 3 sets - 10 reps  - Caregiver PROM Hip Internal External Rotation  - 1 x daily - 7 x weekly - 3 sets - 10 reps  - Foot Inversion and Eversion PROM Caregiver  - 1 x daily - 7 x weekly - 3 sets - 10 reps      Goals:  Resolved       PT Problem       PT Goal 1 (Met)       Start:  05/06/24     "Expected End:  08/04/24    Resolved:  07/30/24    Patient will perform w/c to bed or mat transfer via stand or squat pivot at Mod Assist in order to decrease caregiver burden.         PT Goal 2 (Not met)       Start:  05/06/24    Expected End:  08/04/24    Resolved:  07/30/24    Patient will perform sit to stand with LRAD at Min Assist to improve functional mobility         PT Goal 3 (Not met)       Start:  05/06/24    Expected End:  08/04/24    Resolved:  07/30/24    Patient will improve Sarkar Balance by 4 pts to meet MDC for chronic stroke population and improve balance         PT Goal 4 (Met)       Start:  05/06/24    Expected End:  08/04/24    Resolved:  07/30/24    Patient will perform home program with assist of caregiver to improve ROM and maintain functional mobility         PT Goal 5 (Not met)       Start:  05/06/24    Expected End:  08/04/24    Resolved:  07/30/24    Patient will perform all bed mobility at stand by assist to improve functional mobility and independence          Patient Stated Goal 1 (Adequate for Discharge)       Start:  05/06/24    Expected End:  08/04/24    Resolved:  07/30/24    Per wife: \"be able to pivot better and take some steps\"            "

## 2024-07-30 NOTE — PROGRESS NOTES
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 Patient was unable to provide report beyond non-verbal head nod/shake of variable accuracy. He brought his Lingraphica SGD to today's visit. He identified having no pain using a visual pain scale provided by therapist. Patient's aide managed scheduling of ongoing visits.      General Visit Information:  Number of Authorized Treatments : Based on MN (She[place])  Total Number of Visits : 14      Pain Assessment:  Pain Assessment: 0-10  Pain Score: 0 - No pain      Objective     GOALS: Start date: 7/9/2024; End/re-eval date: 10/7/2024  By discharge:  LTG: Patient will comprehend communication and express information for basic wants/needs/ideas using total communication in order to increased participation in the home and community environments.    STGs:   1. Patient will perform auditory comprehension tasks (e.g. yes/no questions, etc.) using total communication with 90% accuracy given mild cueing.  > ADDRESSED/ONGOING: Patient answered yes/no questions with <50% accuracy today using gesture response only (practiced only to address reliability of gestural responses in the absence of patient's SGD or  "when unable to use SGD during PT sessions, etc.). Patient continues to have consistent difficulty with responding to other's questions using gesture (head nod/shake, thumb up/down, etc.), demo'ing a tendency to combine movements, making it unclear to listeners what his response is. Patient needed max cueing to use gestures singularly, and responses were unreliable. Yes/no responses supplied via SGD were significantly more reliable (approx 70%). When unsure of an answer, patient sometimes perseverated on one response and sometimes he gestured a correct response but chose the incorrect icon, self-correcting his error x1. Patient is most reliable with use of SGD. Icon for \"repeat that please\" was added to patient's device.    2. Patient will imitate oral movements for basic sound combinations (VC, CV, etc.) with or without vocal production in 9/10 opportunities given 1:1 verbal and visual models.  > NOT ADDRESSED/ONGOING. No spontaneous verbalizations today.    3. Patient will perform reading comprehension tasks at the single word through single sentence levels using total communication with 80% accuracy given mild cueing.  > ADDRESSED/ONGOING: Patient matched pictures to written words from a F/O 4 pictures with 70% accuracy indep. He identified words following auditory label with 80% accuracy indep. Patient identified odd man out from a F/O 3-5 words with approx 50% accuracy with therapist reading words aloud, <25% accuracy reading only/independently.    4. Patient will perform written expression tasks at the single word level with 80% accuracy given mild cueing.  > NOT ADDRESSED/ONGOING.        Outpatient Education:  Individual(s) Educated: Patient, aide  Verbal Education : Summary of session performance  Risk and Benefits Discussed with Patient/Caregiver/Other: yes  Patient/Caregiver Demonstrated Understanding: yes  Plan of Care Discussed and Agreed Upon: yes  Patient Response to Education: Patient/Caregiver " Verbalized Understanding of Information, Patient/Caregiver Asked Appropriate Questions

## 2024-08-07 ENCOUNTER — PATIENT OUTREACH (OUTPATIENT)
Dept: PRIMARY CARE | Facility: CLINIC | Age: 69
End: 2024-08-07
Payer: COMMERCIAL

## 2024-08-07 DIAGNOSIS — I69.320 APHASIA FOLLOWING CEREBRAL INFARCTION: ICD-10-CM

## 2024-08-07 DIAGNOSIS — I69.351 HEMIPLEGIA AND HEMIPARESIS FOLLOWING CEREBRAL INFARCTION AFFECTING RIGHT DOMINANT SIDE (MULTI): ICD-10-CM

## 2024-08-08 DIAGNOSIS — L71.9 ROSACEA: ICD-10-CM

## 2024-08-08 NOTE — PROGRESS NOTES
Per Zabrina everything is going well.  He is eating well.. New is able to take and obtain medicines with assist.   New has had no falls or injuries at home. Zabrina works from home so she can care for her spouse. New had a dental cleaning. He does not know how to spit. They were able to accommodate him. Will look into if lessons / instruction is available for this task.    Blood pressure has been under control at 110/70. Wife voiced no issues at this time.    Will reach out with any concerns or needs.   Mastoid Interpolation Flap Text: A decision was made to reconstruct the defect utilizing an interpolation axial flap and a staged reconstruction.  A telfa template was made of the defect.  This telfa template was then used to outline the mastoid interpolation flap.  The donor area for the pedicle flap was then injected with anesthesia.  The flap was excised through the skin and subcutaneous tissue down to the layer of the underlying musculature.  The pedicle flap was carefully excised within this deep plane to maintain its blood supply.  The edges of the donor site were undermined.   The donor site was closed in a primary fashion.  The pedicle was then rotated into position and sutured.  Once the tube was sutured into place, adequate blood supply was confirmed with blanching and refill.  The pedicle was then wrapped with xeroform gauze and dressed appropriately with a telfa and gauze bandage to ensure continued blood supply and protect the attached pedicle.

## 2024-08-09 RX ORDER — TRIAMCINOLONE ACETONIDE 1 MG/G
CREAM TOPICAL 2 TIMES DAILY
Qty: 30 G | Refills: 0 | Status: SHIPPED | OUTPATIENT
Start: 2024-08-09

## 2024-08-20 ENCOUNTER — TREATMENT (OUTPATIENT)
Dept: SPEECH THERAPY | Facility: CLINIC | Age: 69
End: 2024-08-20
Payer: COMMERCIAL

## 2024-08-20 DIAGNOSIS — I69.390 APRAXIA FOLLOWING CEREBROVASCULAR ACCIDENT: ICD-10-CM

## 2024-08-20 DIAGNOSIS — I69.320 APHASIA FOLLOWING CEREBRAL INFARCTION: ICD-10-CM

## 2024-08-20 PROCEDURE — 92507 TX SP LANG VOICE COMM INDIV: CPT | Mod: GN

## 2024-08-20 ASSESSMENT — PAIN SCALES - GENERAL: PAINLEVEL_OUTOF10: 0 - NO PAIN

## 2024-08-20 ASSESSMENT — PAIN - FUNCTIONAL ASSESSMENT: PAIN_FUNCTIONAL_ASSESSMENT: 0-10

## 2024-08-20 NOTE — PROGRESS NOTES
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 Patient was unable to provide report beyond non-verbal head nod/shake of variable accuracy. He brought his Lingraphica SGD to today's visit. He identified having no pain using a visual pain scale provided by therapist. A note from patient's wife indicated that patient had difficulty spitting at a recent dentist appointment (d/t apraxia) and for need to spit out food in an attempt not to choke on it. Dentist's office was able to accommodate patient by using suction. Therapist requested addl information from patient's wife re: choking on food. Additionally, patient is reported to be verbalizing more at home.      General Visit Information:  Number of Authorized Treatments : Based on MN (Shea)  Total Number of Visits : 15      Pain Assessment:  Pain Assessment: 0-10  Pain Score: 0 - No pain      Objective     GOALS: Start date: 7/9/2024; End/re-eval date: 10/7/2024  By discharge:  LTG: Patient will comprehend communication and express information for basic wants/needs/ideas using total communication in order to increased participation in the home and community  "environments.  > GOAL STATUS: Ongoing; making progress    STGs:   1. Patient will perform auditory comprehension tasks (e.g. yes/no questions, etc.) using total communication with 90% accuracy given mild cueing.  > ADDRESSED: Informally. Patient verbalized \"no\" in response to a question today. His yes/no responses seemed reliable in the majority of casual scenarios.  > GOAL STATUS: Ongoing; making progress    2. Patient will imitate oral movements for basic sound combinations (VC, CV, etc.) with or without vocal production in 9/10 opportunities given 1:1 verbal and visual models.  > ADDRESSED: Patient spontaneously verbalized \"no\" today. On other occasions, patient visibly tried to formulate words and vocalize in response to therapist's questions. With initial errorless learning, patient was able to navigate to the \"therapy exercises\" tab on his SGD and performed various oral motor exercises for lips/tongue with approx 70% accuracy given mild addl assist and modeling. Patient had difficulty formulating sounds to produce the word \"water\" and his brother's name (Derick).  > GOAL STATUS: Ongoing; making progress    3. Patient will perform reading comprehension tasks at the single word through single sentence levels using total communication with 80% accuracy given mild cueing.  > NOT ADDRESSED.  > GOAL STATUS: Ongoing; making progress    4. Patient will perform written expression tasks at the single word level with 80% accuracy given mild cueing.  > ADDRESSED: Patient was unable to type his brother's name (Derick) independently. Given all the letters of his name for patient to copy, he required assist to locate x1/4 letters of brother's name for accurate typing.   > GOAL STATUS: Ongoing; making progress    *With prompting (e.g. show me how you would navigate to your mouth exercises folder), patient was able to navigate his SGD to functionally communicate information or to engage in therapeutic practice with mild cueing and " initial use of errorless learning principles.      Outpatient Education:  Individual(s) Educated: Patient, aide  Verbal Education : Summary of session performance  Risk and Benefits Discussed with Patient/Caregiver/Other: yes  Patient/Caregiver Demonstrated Understanding: yes  Plan of Care Discussed and Agreed Upon: yes  Patient Response to Education: Patient/Caregiver Verbalized Understanding of Information, Patient/Caregiver Asked Appropriate Questions

## 2024-08-27 ENCOUNTER — APPOINTMENT (OUTPATIENT)
Dept: SPEECH THERAPY | Facility: CLINIC | Age: 69
End: 2024-08-27
Payer: COMMERCIAL

## 2024-08-29 ENCOUNTER — APPOINTMENT (OUTPATIENT)
Dept: SPEECH THERAPY | Facility: CLINIC | Age: 69
End: 2024-08-29
Payer: COMMERCIAL

## 2024-09-04 ENCOUNTER — PATIENT OUTREACH (OUTPATIENT)
Dept: PRIMARY CARE | Facility: CLINIC | Age: 69
End: 2024-09-04
Payer: MEDICARE

## 2024-09-04 DIAGNOSIS — I69.351 HEMIPLEGIA AND HEMIPARESIS FOLLOWING CEREBRAL INFARCTION AFFECTING RIGHT DOMINANT SIDE (MULTI): ICD-10-CM

## 2024-09-04 DIAGNOSIS — I69.320 APHASIA FOLLOWING CEREBRAL INFARCTION: ICD-10-CM

## 2024-09-04 DIAGNOSIS — I63.132 CEREBRAL INFARCTION DUE TO EMBOLISM OF LEFT CAROTID ARTERY (MULTI): ICD-10-CM

## 2024-09-04 NOTE — PROGRESS NOTES
Chart Reviewed Prior to Patient Outreach     Spoke with pt's wife Zabrina. She states that pt is getting a leg brace on 9/20/24 which will allow him to increase his mobility. Once this happens, he can begin PT again. He continues to work with Speech Therapy and per the Speech Therapist's note, New is making progress and verbalizing more frequently. He will be working with Speech Therapy twice per week going forward.     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 He sleeps in a hospital bed and is confined to the first floor of their house, but his goal is to be able to regain his mobility and coordination so he can go upstairs and sleep in his own bed.  Zabrina states that he is not currently in pain and that he generally has no complaints of pain. They are working with a home health care agency for aide services for 4 hours per day and she pays out of pocket for two other aides so she can work from home. She is a manager in IT services and works 12 hour days, 5 days per week.     Zabrina states that at this time, they do not have any needs.

## 2024-09-11 ENCOUNTER — TREATMENT (OUTPATIENT)
Dept: SPEECH THERAPY | Facility: CLINIC | Age: 69
End: 2024-09-11
Payer: MEDICARE

## 2024-09-11 DIAGNOSIS — I69.390 APRAXIA FOLLOWING CEREBROVASCULAR ACCIDENT: ICD-10-CM

## 2024-09-11 DIAGNOSIS — I69.320 APHASIA FOLLOWING CEREBRAL INFARCTION: ICD-10-CM

## 2024-09-11 PROCEDURE — 92507 TX SP LANG VOICE COMM INDIV: CPT | Mod: GN

## 2024-09-11 ASSESSMENT — PAIN - FUNCTIONAL ASSESSMENT: PAIN_FUNCTIONAL_ASSESSMENT: 0-10

## 2024-09-11 ASSESSMENT — PAIN SCALES - GENERAL: PAINLEVEL_OUTOF10: 0 - NO PAIN

## 2024-09-11 NOTE — PROGRESS NOTES
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 Wife reported that patient's daytime aide quit 30 minutes before his ride was to pick him up for therapy today. Since last visit, wife stated that patient has been showing persistent signs of improvement, including verbalization of a sentence that shocked both her and his aide when he produced it. Additionally, patient laughed yesterday with prolongation as well as baseline vocal ability. He is communicating more using his SGD.      General Visit Information:  Number of Authorized Treatments : Based on MN (Shea)  Total Number of Visits : 16      Pain Assessment:  Pain Assessment: 0-10  Pain Score: 0 - No pain      Objective     GOALS: Start date: 7/9/2024; End/re-eval date: 10/7/2024  By discharge:  LTG: Patient will comprehend communication and express information for basic wants/needs/ideas using total communication in order to increased participation in the home and community environments.  > GOAL STATUS: Ongoing; making progress    STGs:   1. Patient will perform auditory comprehension tasks (e.g. yes/no questions, etc.) using total communication with 90% accuracy given mild cueing.  >  "ADDRESSED: Informally. Patient provided nonverbal responses to yes/no questions only today. His yes/no responses seemed reliable in the majority of casual scenarios.  > GOAL STATUS: Ongoing; making progress    2. Patient will imitate oral movements for basic sound combinations (VC, CV, etc.) with or without vocal production in 9/10 opportunities given 1:1 verbal and visual models.  > ADDRESSED: Patient mouthed what appeared to be \"I don't know\" on x1 occasional today. No, other spontaneous speech or vocalization attempts were noted. Patient independently navigated to the \"therapy exercises\" tab on his SGD and performed various oral motor exercises for lips/tongue with approx 80% accuracy given mild addl assist and modeling. Patient had difficulty formulating isolated speech sounds including /m, t, k, f/ despite max verbal and visual modeling. Therapist cued patient to take a controlled breath in order to produce a sigh. He had difficulty doing this. If patient is able to achieve volitional control of breath, voiceless fricatives can be more effectively targeted.  > GOAL STATUS: Ongoing; making progress    3. Patient will perform reading comprehension tasks at the single word through single sentence levels using total communication with 80% accuracy given mild cueing.  > NOT ADDRESSED.  > GOAL STATUS: Ongoing; making progress    4. Patient will perform written expression tasks at the single word level with 80% accuracy given mild cueing.  > NOT ADDRESSED.  > GOAL STATUS: Ongoing; making progress    *During a conversational exchange this date, therapist asked patient the name of a place he likes to eat at. Patient navigated to his restaurants page to communicate \"[employer].\" When therapist provided information about what she liked to order from [employer], patient spontaneously navigated to his \"food\" page to find a picture of an ice cream cone, indicating that is what he likes to order from [employer]. This is the " first time patient has ever provided reciprocal communication without specifically being prompted!      Outpatient Education:  Individual(s) Educated: Patient, wife  Verbal Education : Summary of session performance; HEP  Risk and Benefits Discussed with Patient/Caregiver/Other: yes  Patient/Caregiver Demonstrated Understanding: yes  Plan of Care Discussed and Agreed Upon: yes  Patient Response to Education: Patient/Caregiver Verbalized Understanding of Information, Patient/Caregiver Asked Appropriate Questions

## 2024-09-13 ENCOUNTER — APPOINTMENT (OUTPATIENT)
Dept: PRIMARY CARE | Facility: CLINIC | Age: 69
End: 2024-09-13
Payer: COMMERCIAL

## 2024-09-16 ENCOUNTER — APPOINTMENT (OUTPATIENT)
Dept: NEUROLOGY | Facility: CLINIC | Age: 69
End: 2024-09-16
Payer: COMMERCIAL

## 2024-09-16 VITALS — SYSTOLIC BLOOD PRESSURE: 129 MMHG | HEART RATE: 65 BPM | TEMPERATURE: 97.7 F | DIASTOLIC BLOOD PRESSURE: 66 MMHG

## 2024-09-16 DIAGNOSIS — I69.320 APHASIA FOLLOWING CEREBRAL INFARCTION: ICD-10-CM

## 2024-09-16 DIAGNOSIS — I69.351 HEMIPLEGIA AND HEMIPARESIS FOLLOWING CEREBRAL INFARCTION AFFECTING RIGHT DOMINANT SIDE (MULTI): Primary | ICD-10-CM

## 2024-09-16 PROCEDURE — 1159F MED LIST DOCD IN RCRD: CPT | Performed by: PSYCHIATRY & NEUROLOGY

## 2024-09-16 PROCEDURE — 1123F ACP DISCUSS/DSCN MKR DOCD: CPT | Performed by: PSYCHIATRY & NEUROLOGY

## 2024-09-16 PROCEDURE — 99213 OFFICE O/P EST LOW 20 MIN: CPT | Performed by: PSYCHIATRY & NEUROLOGY

## 2024-09-16 PROCEDURE — 3074F SYST BP LT 130 MM HG: CPT | Performed by: PSYCHIATRY & NEUROLOGY

## 2024-09-16 PROCEDURE — 3078F DIAST BP <80 MM HG: CPT | Performed by: PSYCHIATRY & NEUROLOGY

## 2024-09-16 NOTE — PROGRESS NOTES
NEUROLOGY OUTPATIENT FOLLOW-UP NOTE    Assessment/Plan   Diagnoses and all orders for this visit:  Hemiplegia and hemiparesis following cerebral infarction affecting right dominant side (Multi)  -     Referral to Neurology; Future  Aphasia following cerebral infarction      IMPRESSION:  Residual mixed (though mostly expressive) aphasia and right hemiplegia from large LMCA-distribution infarct with hemorrhagic conversion.     PLAN:  Refer to [facility] for potential right arm botox  Continue speech therapy  Continue ASA and atorvastatin for stroke prophylaxis.  I will see him in six months or prn    Bean Vu Jr., M.D., FAAN   ----------    Subjective     New Pierre is a 68 y.o. year old male here for follow-up.    Can say full sentences sometimes, but aphasia is generally unchanged since the last visit.  He is being fitted for a brace for the right leg so he can stand.  However, not much improvement of the right hemiparesis.  The right arm is hypertonic and the patient's wife reports that his physical therapist told her to ask about botulinum toxin for the right arm.    No past medical history on file.  No past surgical history on file.  Social History     Tobacco Use    Smoking status: Never    Smokeless tobacco: Never   Substance Use Topics    Alcohol use: Never     family history is not on file.    Current Outpatient Medications:     aspirin 325 mg tablet, Take 1 tablet (325 mg) by mouth once daily., Disp: , Rfl:     atorvastatin (Lipitor) 40 mg tablet, Take 1 tablet (40 mg) by mouth once daily., Disp: 90 tablet, Rfl: 1    famotidine (Pepcid) 20 mg tablet, Take 1 tablet (20 mg) by mouth 2 times a day. (Patient not taking: Reported on 9/16/2024), Disp: 180 tablet, Rfl: 1    metroNIDAZOLE (Metrogel) 0.75 % gel, Apply topically 2 times a day. Apply twice daily to rosacea, Disp: 45 g, Rfl: 2    QUEtiapine (SEROquel) 25 mg tablet, Take 1 tablet (25 mg) by mouth once daily at bedtime., Disp: 90 tablet, Rfl:  1    sertraline (Zoloft) 100 mg tablet, Take 1 tablet (100 mg) by mouth once daily., Disp: 100 tablet, Rfl: 1    triamcinolone (Kenalog) 0.1 % cream, APPLY TOPICALLY 2 TIMES A DAY. APPLY TO AFFECTED AREA 1-2 TIMES DAILY AS NEEDED., Disp: 30 g, Rfl: 0  Allergies   Allergen Reactions    Chlorhexidine Unknown       Objective     /66   Pulse 65   Temp 36.5 °C (97.7 °F)     CONSTITUTIONAL:  No acute distress     CARDIOVASCULAR:  Normal pulses in the distal legs, mild edema of the right leg, but no edema of either arm or the left leg.  No carotid bruits.     MENTAL STATUS:  Awake, alert.     SPEECH AND LANGUAGE:  Mute.  Can demonstrate function of objects when asked.  Follows most commands, has some difficulty with complex commands.     FUNDOSCOPIC:  No papilledema     CRANIAL NERVES:  II-Vision present, has right hemanopsia     III/IV/VI--EOMs are present in all directions.  Pupils are symmetrically reactive in dim light.  Minor right ptosis.     V--Normal facial sensation.     VII--Right facial asymmetry involving lips, right eyelid does not close fully, and slight involvement of forehead.     VIII--Hearing present to voice bilaterally.     IX/X--Symmetric soft palate rise.     XI--Right trapezius is plegic, left has normal power .     XII--Tongue protrudes without deviation.     MOTOR:  Spastic right hemiplegia.  Partial contractures in elbow, wrist, and finger flexion, but I can fully extend the right arm at the elbow and the fingers.  No contractures in the right leg.  Normal power, tone, and bulk in the left arm and leg.     SENSORY:  Present pin sensation in both arms and both legs, reduced in the right leg.     COORDINATION:  Normal finger-to-nose and heel-to-shin testing in the left arm and leg, unable in the right limbs because of plegia.     REFLEXES are brisker on the right limbs than the left.  The plantar responses are flexor.     GAIT deferred because of hemiplegia.      Bean Vu Jr.,  L[facility]

## 2024-09-17 ENCOUNTER — APPOINTMENT (OUTPATIENT)
Dept: SPEECH THERAPY | Facility: CLINIC | Age: 69
End: 2024-09-17
Payer: MEDICARE

## 2024-09-17 ENCOUNTER — DOCUMENTATION (OUTPATIENT)
Dept: SPEECH THERAPY | Facility: CLINIC | Age: 69
End: 2024-09-17
Payer: MEDICARE

## 2024-09-17 NOTE — PROGRESS NOTES
Speech-Language Pathology                 Therapy Communication Note    Patient Name: New Pierre  MRN: [id number]  Today's Date: 9/17/2024     Discipline: Speech Language Pathology    Missed Visit Reason: Patient cancelled his scheduled Speech Therapy appointment this date 2/2 transportation.    Missed Time: Cancel

## 2024-09-18 ENCOUNTER — TREATMENT (OUTPATIENT)
Dept: SPEECH THERAPY | Facility: CLINIC | Age: 69
End: 2024-09-18
Payer: MEDICARE

## 2024-09-18 DIAGNOSIS — I69.390 APRAXIA FOLLOWING CEREBROVASCULAR ACCIDENT: ICD-10-CM

## 2024-09-18 DIAGNOSIS — I69.320 APHASIA FOLLOWING CEREBRAL INFARCTION: ICD-10-CM

## 2024-09-18 PROCEDURE — 92507 TX SP LANG VOICE COMM INDIV: CPT | Mod: GN

## 2024-09-18 ASSESSMENT — PAIN - FUNCTIONAL ASSESSMENT: PAIN_FUNCTIONAL_ASSESSMENT: 0-10

## 2024-09-18 ASSESSMENT — PAIN SCALES - GENERAL: PAINLEVEL_OUTOF10: 0 - NO PAIN

## 2024-09-18 NOTE — PROGRESS NOTES
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 He brought his SGD to today's visit.      General Visit Information:  Number of Authorized Treatments : Based on MN (Shea)  Total Number of Visits : 17      Pain Assessment:  Pain Assessment: 0-10  Pain Score: 0 - No pain      Objective     GOALS: Start date: 7/9/2024; End/re-eval date: 10/7/2024  By discharge:  LTG: Patient will comprehend communication and express information for basic wants/needs/ideas using total communication in order to increased participation in the home and community environments.  > GOAL STATUS: Ongoing; making progress    STGs:   1. Patient will perform auditory comprehension tasks (e.g. yes/no questions, etc.) using total communication with 90% accuracy given mild cueing.  > ADDRESSED: Patient provided responses to yes/no questions using his SGD with approx 75% accuracy given frequent repetition x1-2. Therapist encouraged patient not to guess at answers but to utilize the button his SGD to request repetition in order to be sure of his responses.  > GOAL STATUS: Ongoing; making progress    2. Patient will imitate oral movements for basic sound combinations (VC, CV,  "etc.) with or without vocal production in 9/10 opportunities given 1:1 verbal and visual models.  > ADDRESSED: Patient formulated isolated speech sounds including /m, t, k, f/ given max verbal and visual modeling.  > GOAL STATUS: Ongoing; making progress    3. Patient will perform reading comprehension tasks at the single word through single sentence levels using total communication with 80% accuracy given mild cueing.  > NOT ADDRESSED.  > GOAL STATUS: Ongoing; making progress    4. Patient will perform written expression tasks at the single word level with 80% accuracy given mild cueing.  > ADDRESSED: Patient typed/copied \"New, hi, darcyluis\" during today's visit. He was faster to locate buttons to spell words correctly in comparison to previous sessions. When unable to find a letter, therapist encouraged patient to scan each row until he found the intended letter.  > GOAL STATUS: Ongoing; making progress      Outpatient Education:  Individual(s) Educated: Patient, aide  Verbal Education : Summary of session performance; HEP  Risk and Benefits Discussed with Patient/Caregiver/Other: yes  Patient/Caregiver Demonstrated Understanding: yes  Plan of Care Discussed and Agreed Upon: yes  Patient Response to Education: Patient/Caregiver Verbalized Understanding of Information, Patient/Caregiver Asked Appropriate Questions  "

## 2024-09-24 ENCOUNTER — TREATMENT (OUTPATIENT)
Dept: SPEECH THERAPY | Facility: CLINIC | Age: 69
End: 2024-09-24
Payer: MEDICARE

## 2024-09-24 DIAGNOSIS — I69.390 APRAXIA FOLLOWING CEREBROVASCULAR ACCIDENT: ICD-10-CM

## 2024-09-24 DIAGNOSIS — I69.320 APHASIA FOLLOWING CEREBRAL INFARCTION: ICD-10-CM

## 2024-09-24 PROCEDURE — 92507 TX SP LANG VOICE COMM INDIV: CPT | Mod: GN

## 2024-09-24 ASSESSMENT — PAIN - FUNCTIONAL ASSESSMENT: PAIN_FUNCTIONAL_ASSESSMENT: 0-10

## 2024-09-24 ASSESSMENT — PAIN SCALES - GENERAL: PAINLEVEL_OUTOF10: 0 - NO PAIN

## 2024-09-24 NOTE — PROGRESS NOTES
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 He brought his SGD to today's visit.      General Visit Information:  Number of Authorized Treatments : Based on MN (Shea)  Total Number of Visits : 18      Pain Assessment:  Pain Assessment: 0-10  Pain Score: 0 - No pain      Objective     GOALS: Start date: 7/9/2024; End/re-eval date: 10/7/2024  By discharge:  LTG: Patient will comprehend communication and express information for basic wants/needs/ideas using total communication in order to increased participation in the home and community environments.  > GOAL STATUS: Ongoing; making progress    STGs:   1. Patient will perform auditory comprehension tasks (e.g. yes/no questions, etc.) using total communication with 90% accuracy given mild cueing.  > ADDRESSED: Patient provided responses to yes/no questions using his SGD with 60% accuracy (9/15) given x0-2 repetitions. Therapist encouraged patient not to guess at answers but to utilize the button his SGD to request repetition in order to be sure of his responses. This required max prompting for use. Repetition did not result in a significant improvement in accuracy. Yes/no questions were then  "addressed using a combined verbal/written approach, and patient's accuracy improved to 80% accuracy (8/10) given x0-2 repetitions. Patient cont'd to require max prompting to utilize button to request repetition.  > GOAL STATUS: Ongoing; making progress    2. Patient will imitate oral movements for basic sound combinations (VC, CV, etc.) with or without vocal production in 9/10 opportunities given 1:1 verbal and visual models.  > NOT ADDRESSED. Informally, patient was prompted to say \"no\" at the end of the visit, and he demo'd oral/verbal groping to produce.  > GOAL STATUS: Ongoing; making progress    3. Patient will perform reading comprehension tasks at the single word through single sentence levels using total communication with 80% accuracy given mild cueing.  > ADDRESSED: See goal #1.  > GOAL STATUS: Ongoing; making progress    4. Patient will perform written expression tasks at the single word level with 80% accuracy given mild cueing.  > ADDRESSED: Patient typed/copied \"Ignacio Wolff\" during today's visit. He was significantly faster to locate buttons to spell words correctly in comparison to previous sessions. When unable to find a letter, therapist encouraged patient to scan each row until he found the intended letter. Patient typed \"New\" from memory with x3/5 letters identified and located/typed independently.  > GOAL STATUS: Ongoing; making progress      Outpatient Education:  Individual(s) Educated: Patient, aide  Verbal Education : Summary of session performance; HEP  Risk and Benefits Discussed with Patient/Caregiver/Other: yes  Patient/Caregiver Demonstrated Understanding: yes  Plan of Care Discussed and Agreed Upon: yes  Patient Response to Education: Patient/Caregiver Verbalized Understanding of Information, Patient/Caregiver Asked Appropriate Questions  "

## 2024-09-25 ENCOUNTER — TREATMENT (OUTPATIENT)
Dept: SPEECH THERAPY | Facility: CLINIC | Age: 69
End: 2024-09-25
Payer: MEDICARE

## 2024-09-25 DIAGNOSIS — I69.320 APHASIA FOLLOWING CEREBRAL INFARCTION: ICD-10-CM

## 2024-09-25 DIAGNOSIS — I69.390 APRAXIA FOLLOWING CEREBROVASCULAR ACCIDENT: ICD-10-CM

## 2024-09-25 PROCEDURE — 92507 TX SP LANG VOICE COMM INDIV: CPT | Mod: GN

## 2024-09-25 ASSESSMENT — PAIN - FUNCTIONAL ASSESSMENT: PAIN_FUNCTIONAL_ASSESSMENT: 0-10

## 2024-09-25 ASSESSMENT — PAIN SCALES - GENERAL: PAINLEVEL_OUTOF10: 0 - NO PAIN

## 2024-09-25 NOTE — PROGRESS NOTES
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 He brought his SGD to today's visit.      General Visit Information:  Number of Authorized Treatments : Based on MN (Shea)  Total Number of Visits : 18      Pain Assessment:  Pain Assessment: 0-10  Pain Score: 0 - No pain      Objective     GOALS: Start date: 7/9/2024; End/re-eval date: 10/7/2024  By discharge:  LTG: Patient will comprehend communication and express information for basic wants/needs/ideas using total communication in order to increased participation in the home and community environments.  > GOAL STATUS: Ongoing; making progress    STGs:   1. Patient will perform auditory comprehension tasks (e.g. yes/no questions, etc.) using total communication with 90% accuracy given mild cueing.  > NOT ADDRESSED.  > GOAL STATUS: Ongoing; making progress    2. Patient will imitate oral movements for basic sound combinations (VC, CV, etc.) with or without vocal production in 9/10 opportunities given 1:1 verbal and visual models.  > NOT ADDRESSED.  > GOAL STATUS: Ongoing; making progress    3. Patient will perform reading comprehension tasks at the single word through single sentence levels  "using total communication with 80% accuracy given mild cueing.  > NOT ADDRESSED.  > GOAL STATUS: Ongoing; making progress    4. Patient will perform written expression tasks at the single word level with 80% accuracy given mild cueing.  > ADDRESSED: Patient typed/copied \"Ignacio Wolff\" during today's visit. He was significantly faster to locate buttons to spell words correctly in comparison to previous sessions. When unable to find a letter, therapist encouraged patient to scan each row until he found the intended letter. Patient typed \"New\" from memory with x3/5 letters identified and located/typed independently.  > GOAL STATUS: Ongoing; making progress    *Exploration of expressive language skills targeted. Using pictured scenes, therapist used buttons programmed on patient's SGD to have patient identify who/subject and action/verb in each picture which he did with approx 50% accuracy indep. Continue exploration at next session.      Outpatient Education:  Individual(s) Educated: Patient, aide  Verbal Education : Summary of session performance; HEP  Risk and Benefits Discussed with Patient/Caregiver/Other: yes  Patient/Caregiver Demonstrated Understanding: yes  Plan of Care Discussed and Agreed Upon: yes  Patient Response to Education: Patient/Caregiver Verbalized Understanding of Information, Patient/Caregiver Asked Appropriate Questions  "

## 2024-10-01 ENCOUNTER — APPOINTMENT (OUTPATIENT)
Dept: SPEECH THERAPY | Facility: CLINIC | Age: 69
End: 2024-10-01
Payer: MEDICARE

## 2024-10-01 ENCOUNTER — PATIENT OUTREACH (OUTPATIENT)
Dept: PRIMARY CARE | Facility: CLINIC | Age: 69
End: 2024-10-01
Payer: MEDICARE

## 2024-10-01 ENCOUNTER — DOCUMENTATION (OUTPATIENT)
Dept: PRIMARY CARE | Facility: CLINIC | Age: 69
End: 2024-10-01

## 2024-10-01 DIAGNOSIS — I69.351 HEMIPLEGIA AND HEMIPARESIS FOLLOWING CEREBRAL INFARCTION AFFECTING RIGHT DOMINANT SIDE (MULTI): ICD-10-CM

## 2024-10-01 DIAGNOSIS — I63.9 ISCHEMIC CEREBROVASCULAR ACCIDENT (CVA) (MULTI): Primary | ICD-10-CM

## 2024-10-01 DIAGNOSIS — I63.132 CEREBRAL INFARCTION DUE TO EMBOLISM OF LEFT CAROTID ARTERY (MULTI): ICD-10-CM

## 2024-10-01 NOTE — PROGRESS NOTES
Chart Reviewed Prior to Patient Outreach     Pt's wife Zabrina called stating that they need a referral for PT now that he has a new leg brace from [employer].     Per Zabrina, pt has made excellent progress with Speech Therapy and can speak in full sentences now. He is also learning some American Sign Language. Zabrina states that overall, New's motivation to regain function remains high.       They saw the Neurologist, who wrote the referral for New to receive Botox injections to relieve contractures. He is now able to lift a 5# wt with his hand.     He is seeing the Eye doctor to determine if his vision has been affected by the CVA.     His appetite is good and Zabrina feels that his wt is stable. Zabrina plans to take him out to a restaurant for his birthday on 10[birth year]22 and this will be the first time that he's been out to a restaurant since his CVA last October.     Zabrina states that at this time, New's only need is for the referral to PT.

## 2024-10-01 NOTE — PROGRESS NOTES
Pt's wife Zabrina called and asked for a new referral for outpatient Physical Therapy. She stated that pt needs a new referral due to receiving a new leg brace from [employer].     Pt will be attending PT at the outpatient rehab facility at [address] [place] [address] in [place]. Thank you.

## 2024-10-02 ENCOUNTER — TREATMENT (OUTPATIENT)
Dept: SPEECH THERAPY | Facility: CLINIC | Age: 69
End: 2024-10-02
Payer: MEDICARE

## 2024-10-02 DIAGNOSIS — I69.390 APRAXIA FOLLOWING CEREBROVASCULAR ACCIDENT: ICD-10-CM

## 2024-10-02 DIAGNOSIS — I69.320 APHASIA FOLLOWING CEREBRAL INFARCTION: ICD-10-CM

## 2024-10-02 PROCEDURE — 92507 TX SP LANG VOICE COMM INDIV: CPT | Mod: GN

## 2024-10-02 ASSESSMENT — PAIN SCALES - GENERAL: PAINLEVEL_OUTOF10: 0 - NO PAIN

## 2024-10-02 ASSESSMENT — PAIN - FUNCTIONAL ASSESSMENT: PAIN_FUNCTIONAL_ASSESSMENT: 0-10

## 2024-10-02 NOTE — PROGRESS NOTES
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 He brought his SGD to today's visit. His wife sent along a note for therapist to notify her of what patient has been practicing at home, including oral motor exercises with videos on his SGD as well as typing his name and other, short 4-letter words (e.g. walk, talk, etc.).      General Visit Information:  Number of Authorized Treatments : Based on MN (Shea)  Total Number of Visits : 19      Pain Assessment:  Pain Assessment: 0-10  Pain Score: 0 - No pain      Objective     GOALS: Start date: 7/9/2024; End/re-eval date: 10/7/2024  By discharge:  LTG: Patient will comprehend communication and express information for basic wants/needs/ideas using total communication in order to increase participation in the home and community environments.  > GOAL STATUS: Ongoing; making progress    STGs:   1. Patient will perform auditory comprehension tasks (e.g. yes/no questions, etc.) using total communication with 90% accuracy given mild cueing.  > NOT ADDRESSED.  > GOAL STATUS: Ongoing; making progress    2. Patient will imitate oral movements for basic sound combinations (VC, CV, etc.) with or without vocal  production in 9/10 opportunities given 1:1 verbal and visual models.  > NOT ADDRESSED.  > GOAL STATUS: Ongoing; making progress    3. Patient will perform reading comprehension tasks at the single word through single sentence levels using total communication with 80% accuracy given mild cueing.  > NOT ADDRESSED.  > GOAL STATUS: Ongoing; making progress    4. Patient will perform written expression tasks at the single word level with 80% accuracy given mild cueing.  > NOT ADDRESSED.  > GOAL STATUS: Ongoing; making progress    5. Patient will perform expressive language tasks using total communication with 90% accuracy given mild cueing.  > ADDRESSED: Sentence formation activity targeted again this date. Using pictured scenes, therapist used buttons programmed on patient's SGD to have patient identify who/subject and action/verb in each picture which he did with approx 80% accuracy indep (therapist supporting navigation of SGD only), a significant improvement from last session. Continue at next session.  > GOAL STATUS: Added 10/2; ongoing; making progress      Outpatient Education:  Individual(s) Educated: Patient, aide  Verbal Education : Summary of session performance; HEP  Risk and Benefits Discussed with Patient/Caregiver/Other: yes  Patient/Caregiver Demonstrated Understanding: yes  Plan of Care Discussed and Agreed Upon: yes  Patient Response to Education: Patient/Caregiver Verbalized Understanding of Information, Patient/Caregiver Asked Appropriate Questions

## 2024-10-16 ENCOUNTER — EVALUATION (OUTPATIENT)
Dept: PHYSICAL THERAPY | Facility: CLINIC | Age: 69
End: 2024-10-16
Payer: MEDICARE

## 2024-10-16 VITALS — DIASTOLIC BLOOD PRESSURE: 70 MMHG | SYSTOLIC BLOOD PRESSURE: 144 MMHG | HEART RATE: 71 BPM

## 2024-10-16 DIAGNOSIS — R26.89 OTHER ABNORMALITIES OF GAIT AND MOBILITY: Primary | ICD-10-CM

## 2024-10-16 DIAGNOSIS — I69.351 HEMIPLEGIA AND HEMIPARESIS FOLLOWING CEREBRAL INFARCTION AFFECTING RIGHT DOMINANT SIDE (MULTI): ICD-10-CM

## 2024-10-16 DIAGNOSIS — I63.9 ISCHEMIC CEREBROVASCULAR ACCIDENT (CVA) (MULTI): ICD-10-CM

## 2024-10-16 PROCEDURE — 97535 SELF CARE MNGMENT TRAINING: CPT | Mod: GP | Performed by: PHYSICAL THERAPIST

## 2024-10-16 PROCEDURE — 97161 PT EVAL LOW COMPLEX 20 MIN: CPT | Mod: GP | Performed by: PHYSICAL THERAPIST

## 2024-10-16 ASSESSMENT — ENCOUNTER SYMPTOMS
DEPRESSION: 1
OCCASIONAL FEELINGS OF UNSTEADINESS: 1
LOSS OF SENSATION IN FEET: 0

## 2024-10-16 ASSESSMENT — PAIN SCALES - GENERAL: PAINLEVEL_OUTOF10: 0 - NO PAIN

## 2024-10-16 ASSESSMENT — PAIN - FUNCTIONAL ASSESSMENT: PAIN_FUNCTIONAL_ASSESSMENT: 0-10

## 2024-10-16 NOTE — PROGRESS NOTES
Physical Therapy    Physical Therapy Evaluation and Treatment      Patient Name: New Pierre  MRN: [id number]  Today's Date: 10/18/2024  Medicare  Medicare POC end date:  Time Entry:   Time Calculation  Start Time: 1435  Stop Time: 1530  Time Calculation (min): 55 min  PT Evaluation Time Entry  PT Evaluation (Low) Time Entry: 30  PT Therapeutic Procedures Time Entry  Self-Care/Home Mgmt Training: 15     Assessment:  Patient is a 68 year old male referred to [facility]'s outpatient physical therapy services s/p chronic CVA with right hemiparesis. Pt. Was recently seen at our facility under a different clinicians care. The patient returned today with a new KAFO device for his right hemiparetic side.  Currently, the brace is set at a degree of extension that is very difficult to lock due to the patient hemiparesis and forwardly flexed posture and retropulsion.   The patient will benefit from having the angle of the brace-lock changed to a more flexed position in addition to having a pully mechanism to unlock the brace to make it easier for family/caregivers to use. I discussed with the patient and caregiver that I am recommending the company that distributed the brace to attend a physical therapy visit for us to work on improving the fit and efficacy of the brace and determine if brace is an effective adaptive piece of equipment.  The brace goes up to the patients proximal thigh which also made low scoot pivot transfer difficult as it got caught in the patient's chair.  Mr. Pierre will benefit from a new custom wheelchair as well as the patient's current standard chair does not fit the patient as it is too small and does not fit the patient's current resting posture, placing patient as risk for skin breakdown.  Pt. Will benefit from brief plan of care to help with orthotic management, provide caregiver training, review HEP and assist pt. In getting a new wheelchair.       Plan: review HEP with caregiver, stand  "pivot transfer assessment with [employer]  OP PT Plan  Treatment/Interventions: Cryotherapy, Education/ Instruction, Gait training, Neuromuscular re-education, Self care/ home management, Therapeutic activities, Therapeutic exercises, Orthosis fit/ training, Hot pack  PT Plan: Skilled PT  PT Frequency: 1 time per week  Duration: 6 visits  Onset Date: 10/01/24  Certification Period Start Date: 10/16/24  Certification Period End Date: 01/14/25  Rehab Potential: Poor    Current Problem:   1. Other abnormalities of gait and mobility  Follow Up In Physical Therapy      2. Ischemic cerebrovascular accident (CVA) (Multi)  Referral to Physical Therapy    Follow Up In Physical Therapy      3. Hemiplegia and hemiparesis following cerebral infarction affecting right dominant side (Multi)  Referral to Physical Therapy    Follow Up In Physical Therapy          Subjective    Due to severe global aphasia.  Pt. Required assistance of his caregiver, extra time and need for PT provide options for answers including yes/no responses.  Pt. Per caregiver pt. got a new brace in the past couple weeks and his wife wants the caregivers to practice with it. They have used the brace during transfers but the brace is not locked. Per caregiver he wears the brace 3 hours a day.  [employer] helped him with his brace.  Pt. Caregiver \"Little\" present during full visit providing assist with history. Pt. Denies pain.     CURRENT FUNCTIONAL LIMITATIONS: Needs assist for ADLs (dressing, washing, transfers), has a bed side commode but doesn't use, wears depends.  Pt. Does sponge baths.  Patient requires physical assist for stand/pivot transfers and per caregiver they perform stand pivot transfers without a device.    CURRENT DME:  cane, manual w/c, hospital bed      SOCIAL HISTORY/LIVING ARRANGEMENT:   Patient lives with wife in a single family home with ramped entrance  Patient sleeps in hospital bed and uses bed side commode  Spends time in the main " "level of the family home     EXERCISE/ACTIVITY LEVEL: pt. Is doing stretches and strengtheningfrom prior therapy     PATIENT GOAL: \"be able to pivot better and take some steps\"        Precautions: HTN, aphasia,      Vital Signs: 144/70 sitting, automatic cuff, HR=71 BPM     Pain: 0/10        Objective        MMT: this may not be an accurate measure of pt. Strength due to pt. With global aphasia and difficulty understanding directions of manual muscle testing  Muscle Right LE Left LE    Hip Flexion (L1-L2) 0/5 4/5   Hip Abduction 0/5 4+/5   Knee Flexion (L3) 0/5 4/5   Knee Extension (L4) 0/5 4+/5   Ankle Dorsiflexion (L5) 0/5 5/5   Ankle Plantarflexion (S1) 0/5 4/5      OBSERVATION: R UE flexor synergy   POSTURE: severe posterior pelvic tilt, L sided pelvic obliquity with L sided trunk lean  TRANSFERS:   Sit<>stand from w/c with enrike walker=mod-A, with KAFO on RLE (unable to lock into extension due to pt. With severely forwardly flexed posture and maximal assist required to get brace into full extension   Stand pivot transfer with no device=mod-A PT blocking R knee              W/C -> Mat: moderate assist 1 via squat pivot   BALANCE:               SITTING: Good without UE support              STANDING: Poor, Requires Mod A x 1 and Enrike-cane to maintain standing for 30 sec. With retropulsion noted   Standing balance with KAFO: moderate assist x2 person assist for 30 sec. (PT locking KAFO into extension and caregiver holding pt. By gait belt)    *due to time constraints, stand pivot transfer with enrike walker will be performed next visit    Treatments:    Self Care:  Educated pt. And caregiver on recommendation for new wheelchair to improve efficiency with transfers, improve seated positioning, reduce risk of pressure ulcer  -Educated pt. And caregiver on my recommendation for pt. Family To reach out to Huong (provider who administered KAFO) to adjust angle of brace to a lesser degree of extension due to its' current " angle not being an efficient angle to get knee locked. In addition I am suggesting a pully release to allow brace to unlock easier by caregiver  -notified pt. Caregiver that right now the KAFO is not an effective piece of equipment to use at its' current locking angle, however we will discuss further once the orthotic company has come to work on the fit of the brace    EDUCATION:  -see flowsheet/ treatment       Goals:  Active       PT Problem       PT Goal 1       Start:  10/18/24    Expected End:  01/14/25       Pt. Caregiver will be independent in completing HEP with pt. By end of POC to manage pt. Symptoms, help pt. Maintain ROM and prevent further functional decline.         PT Goal 2       Start:  10/18/24    Expected End:  01/14/25       Wheelchair evaluation will be performed within current PT POC in order to improve pt. Positioning in his chair, reduce risk of pressure ulcers.         PT Goal 3       Start:  10/18/24    Expected End:  01/14/25       Pt. Caregiver will be able to independently lock and unlock the patient's KAFO in standing in order to improve R knee stability in standing for transfers, ADLS including bathing.

## 2024-10-28 ENCOUNTER — OFFICE VISIT (OUTPATIENT)
Dept: NEUROLOGY | Facility: HOSPITAL | Age: 69
End: 2024-10-28
Payer: MEDICARE

## 2024-10-28 VITALS
SYSTOLIC BLOOD PRESSURE: 131 MMHG | TEMPERATURE: 98.6 F | DIASTOLIC BLOOD PRESSURE: 70 MMHG | HEART RATE: 71 BPM | RESPIRATION RATE: 18 BRPM

## 2024-10-28 DIAGNOSIS — I69.351 HEMIPLEGIA AND HEMIPARESIS FOLLOWING CEREBRAL INFARCTION AFFECTING RIGHT DOMINANT SIDE (MULTI): ICD-10-CM

## 2024-10-28 PROCEDURE — 1036F TOBACCO NON-USER: CPT | Performed by: PSYCHIATRY & NEUROLOGY

## 2024-10-28 PROCEDURE — 3075F SYST BP GE 130 - 139MM HG: CPT | Performed by: PSYCHIATRY & NEUROLOGY

## 2024-10-28 PROCEDURE — 1159F MED LIST DOCD IN RCRD: CPT | Performed by: PSYCHIATRY & NEUROLOGY

## 2024-10-28 PROCEDURE — 3078F DIAST BP <80 MM HG: CPT | Performed by: PSYCHIATRY & NEUROLOGY

## 2024-10-28 PROCEDURE — 1123F ACP DISCUSS/DSCN MKR DOCD: CPT | Performed by: PSYCHIATRY & NEUROLOGY

## 2024-10-28 PROCEDURE — 99205 OFFICE O/P NEW HI 60 MIN: CPT | Performed by: PSYCHIATRY & NEUROLOGY

## 2024-10-28 PROCEDURE — 1126F AMNT PAIN NOTED NONE PRSNT: CPT | Performed by: PSYCHIATRY & NEUROLOGY

## 2024-10-28 PROCEDURE — 99215 OFFICE O/P EST HI 40 MIN: CPT | Mod: GC | Performed by: PSYCHIATRY & NEUROLOGY

## 2024-10-28 RX ORDER — BACLOFEN 10 MG/1
TABLET ORAL
Qty: 94 TABLET | Refills: 0 | Status: SHIPPED | OUTPATIENT
Start: 2024-10-28 | End: 2025-02-02

## 2024-10-28 ASSESSMENT — PAIN SCALES - GENERAL: PAINLEVEL_OUTOF10: 0-NO PAIN

## 2024-10-29 ENCOUNTER — TELEPHONE (OUTPATIENT)
Dept: NEUROLOGY | Facility: CLINIC | Age: 69
End: 2024-10-29

## 2024-10-29 ENCOUNTER — APPOINTMENT (OUTPATIENT)
Dept: PRIMARY CARE | Facility: CLINIC | Age: 69
End: 2024-10-29
Payer: MEDICARE

## 2024-10-29 VITALS — OXYGEN SATURATION: 97 % | HEART RATE: 79 BPM | SYSTOLIC BLOOD PRESSURE: 120 MMHG | DIASTOLIC BLOOD PRESSURE: 80 MMHG

## 2024-10-29 DIAGNOSIS — Z00.00 MEDICARE ANNUAL WELLNESS VISIT, SUBSEQUENT: Primary | ICD-10-CM

## 2024-10-29 DIAGNOSIS — R01.1 MURMUR, HEART: ICD-10-CM

## 2024-10-29 DIAGNOSIS — G81.11 RIGHT SPASTIC HEMIPLEGIA (MULTI): ICD-10-CM

## 2024-10-29 DIAGNOSIS — R73.01 ABNORMAL FASTING GLUCOSE: ICD-10-CM

## 2024-10-29 DIAGNOSIS — I10 PRIMARY HYPERTENSION: ICD-10-CM

## 2024-10-29 DIAGNOSIS — I69.351 HEMIPLEGIA AND HEMIPARESIS FOLLOWING CEREBRAL INFARCTION AFFECTING RIGHT DOMINANT SIDE (MULTI): ICD-10-CM

## 2024-10-29 DIAGNOSIS — E78.2 MIXED HYPERLIPIDEMIA: ICD-10-CM

## 2024-10-29 DIAGNOSIS — G47.09 OTHER INSOMNIA: ICD-10-CM

## 2024-10-29 DIAGNOSIS — F41.8 DEPRESSION WITH ANXIETY: ICD-10-CM

## 2024-10-29 PROBLEM — J96.10 CHRONIC RESPIRATORY FAILURE: Status: RESOLVED | Noted: 2023-08-17 | Resolved: 2024-10-29

## 2024-10-29 PROCEDURE — 1160F RVW MEDS BY RX/DR IN RCRD: CPT | Performed by: FAMILY MEDICINE

## 2024-10-29 PROCEDURE — 1123F ACP DISCUSS/DSCN MKR DOCD: CPT | Performed by: FAMILY MEDICINE

## 2024-10-29 PROCEDURE — 3074F SYST BP LT 130 MM HG: CPT | Performed by: FAMILY MEDICINE

## 2024-10-29 PROCEDURE — 99214 OFFICE O/P EST MOD 30 MIN: CPT | Performed by: FAMILY MEDICINE

## 2024-10-29 PROCEDURE — 1159F MED LIST DOCD IN RCRD: CPT | Performed by: FAMILY MEDICINE

## 2024-10-29 PROCEDURE — G0439 PPPS, SUBSEQ VISIT: HCPCS | Performed by: FAMILY MEDICINE

## 2024-10-29 PROCEDURE — 3079F DIAST BP 80-89 MM HG: CPT | Performed by: FAMILY MEDICINE

## 2024-10-29 RX ORDER — SERTRALINE HYDROCHLORIDE 100 MG/1
100 TABLET, FILM COATED ORAL DAILY
Qty: 100 TABLET | Refills: 1 | Status: SHIPPED | OUTPATIENT
Start: 2024-10-29

## 2024-10-29 RX ORDER — QUETIAPINE FUMARATE 25 MG/1
25 TABLET, FILM COATED ORAL NIGHTLY
Qty: 90 TABLET | Refills: 1 | Status: SHIPPED | OUTPATIENT
Start: 2024-10-29

## 2024-10-29 RX ORDER — ATORVASTATIN CALCIUM 40 MG/1
40 TABLET, FILM COATED ORAL DAILY
Qty: 90 TABLET | Refills: 1 | Status: SHIPPED | OUTPATIENT
Start: 2024-10-29

## 2024-10-29 ASSESSMENT — ANXIETY QUESTIONNAIRES
7. FEELING AFRAID AS IF SOMETHING AWFUL MIGHT HAPPEN: NOT AT ALL
IF YOU CHECKED OFF ANY PROBLEMS ON THIS QUESTIONNAIRE, HOW DIFFICULT HAVE THESE PROBLEMS MADE IT FOR YOU TO DO YOUR WORK, TAKE CARE OF THINGS AT HOME, OR GET ALONG WITH OTHER PEOPLE: NOT DIFFICULT AT ALL
5. BEING SO RESTLESS THAT IT IS HARD TO SIT STILL: NOT AT ALL
4. TROUBLE RELAXING: NOT AT ALL
GAD7 TOTAL SCORE: 0
2. NOT BEING ABLE TO STOP OR CONTROL WORRYING: NOT AT ALL
6. BECOMING EASILY ANNOYED OR IRRITABLE: NOT AT ALL
1. FEELING NERVOUS, ANXIOUS, OR ON EDGE: NOT AT ALL
3. WORRYING TOO MUCH ABOUT DIFFERENT THINGS: NOT AT ALL

## 2024-10-29 ASSESSMENT — PATIENT HEALTH QUESTIONNAIRE - PHQ9
4. FEELING TIRED OR HAVING LITTLE ENERGY: NOT AT ALL
3. TROUBLE FALLING OR STAYING ASLEEP OR SLEEPING TOO MUCH: SEVERAL DAYS
2. FEELING DOWN, DEPRESSED OR HOPELESS: NOT AT ALL
10. IF YOU CHECKED OFF ANY PROBLEMS, HOW DIFFICULT HAVE THESE PROBLEMS MADE IT FOR YOU TO DO YOUR WORK, TAKE CARE OF THINGS AT HOME, OR GET ALONG WITH OTHER PEOPLE: NOT DIFFICULT AT ALL
7. TROUBLE CONCENTRATING ON THINGS, SUCH AS READING THE NEWSPAPER OR WATCHING TELEVISION: NOT AT ALL
1. LITTLE INTEREST OR PLEASURE IN DOING THINGS: SEVERAL DAYS
SUM OF ALL RESPONSES TO PHQ9 QUESTIONS 1 AND 2: 1
8. MOVING OR SPEAKING SO SLOWLY THAT OTHER PEOPLE COULD HAVE NOTICED. OR THE OPPOSITE, BEING SO FIGETY OR RESTLESS THAT YOU HAVE BEEN MOVING AROUND A LOT MORE THAN USUAL: NOT AT ALL
9. THOUGHTS THAT YOU WOULD BE BETTER OFF DEAD, OR OF HURTING YOURSELF: NOT AT ALL
5. POOR APPETITE OR OVEREATING: NOT AT ALL
6. FEELING BAD ABOUT YOURSELF - OR THAT YOU ARE A FAILURE OR HAVE LET YOURSELF OR YOUR FAMILY DOWN: NOT AT ALL
SUM OF ALL RESPONSES TO PHQ QUESTIONS 1-9: 2

## 2024-10-30 ENCOUNTER — TREATMENT (OUTPATIENT)
Dept: SPEECH THERAPY | Facility: CLINIC | Age: 69
End: 2024-10-30
Payer: MEDICARE

## 2024-10-30 DIAGNOSIS — I69.320 APHASIA FOLLOWING CEREBRAL INFARCTION: ICD-10-CM

## 2024-10-30 DIAGNOSIS — I69.390 APRAXIA FOLLOWING CEREBROVASCULAR ACCIDENT: Primary | ICD-10-CM

## 2024-10-30 PROCEDURE — 92507 TX SP LANG VOICE COMM INDIV: CPT | Mod: GN

## 2024-10-30 ASSESSMENT — PAIN - FUNCTIONAL ASSESSMENT: PAIN_FUNCTIONAL_ASSESSMENT: 0-10

## 2024-10-30 ASSESSMENT — PAIN SCALES - GENERAL: PAINLEVEL_OUTOF10: 0 - NO PAIN

## 2024-11-01 ENCOUNTER — PATIENT OUTREACH (OUTPATIENT)
Dept: PRIMARY CARE | Facility: CLINIC | Age: 69
End: 2024-11-01
Payer: MEDICARE

## 2024-11-01 DIAGNOSIS — I69.351 HEMIPLEGIA AND HEMIPARESIS FOLLOWING CEREBRAL INFARCTION AFFECTING RIGHT DOMINANT SIDE (MULTI): ICD-10-CM

## 2024-11-01 DIAGNOSIS — G81.11 RIGHT SPASTIC HEMIPLEGIA (MULTI): ICD-10-CM

## 2024-11-06 ENCOUNTER — TREATMENT (OUTPATIENT)
Dept: SPEECH THERAPY | Facility: CLINIC | Age: 69
End: 2024-11-06
Payer: MEDICARE

## 2024-11-06 DIAGNOSIS — I69.390 APRAXIA FOLLOWING CEREBROVASCULAR ACCIDENT: ICD-10-CM

## 2024-11-06 DIAGNOSIS — I69.320 APHASIA FOLLOWING CEREBRAL INFARCTION: ICD-10-CM

## 2024-11-06 PROCEDURE — 92507 TX SP LANG VOICE COMM INDIV: CPT | Mod: GN

## 2024-11-06 ASSESSMENT — PAIN SCALES - GENERAL: PAINLEVEL_OUTOF10: 0 - NO PAIN

## 2024-11-06 ASSESSMENT — PAIN - FUNCTIONAL ASSESSMENT: PAIN_FUNCTIONAL_ASSESSMENT: 0-10

## 2024-11-06 NOTE — PROGRESS NOTES
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 He brought his SGD to today's visit.       General Visit Information:  Number of Authorized Treatments : Based on MN (Shea)  Total Number of Visits : 21      Pain Assessment:  Pain Assessment: 0-10  Pain Score: 0 - No pain      Objective     Re-Assessment 10/30/2024:    Oral Mechanism Exam / Cranial Nerve Exam:  Trigeminal Nerve (V)     Jaw ROM: WFL  Facial Nerve (VII)     Asymmetry at rest: Right sided facial weakness     Lip pucker: Reduced (R)     Smile: Reduced (R)     Lip/smile alternation: DNT  Glossopharyngeal, Vagus (IX, X)     Uvula at rest: WFL     Palate at rest: WFL     Palatal elevation /a/: CNT (unable to volitionally vocalize)  Hypoglossal (XII)     Tongue at rest: WFL     Tongue protrusion: WFL     Side to side: Reduced (R)     Tongue elevation: WFL      [place] Diagnostic Aphasia Evaluation (BDAE) Short Form:  --Word Comprehension (auditory): 10/14 (71%) [prev. 8/14 (57%)]  --Word/Picture Match (F/O 4 Words): 8/15 (53%)  --Short Sentence Reading Comprehension (F/O 4 Choices): 2/4 (50%)     Other:  --Yes/No Questions (yes/no via SGD): 5/8 (63%) [prev.  4/6 (67%)]  --Following Commands (1-Step): 1/3 (33%) [prev. 0/2 (0%)]      GOALS: Start date: 10/30/2024; End/re-eval date: 1/28/2025  By discharge:  LTG: Patient will comprehend communication and express information for basic wants/needs/ideas using total communication in order to increase participation in the home and community environments.  > GOAL STATUS: Ongoing; making progress    STGs:   1. Patient will perform auditory comprehension tasks (e.g. yes/no questions, etc.) using total communication with 90% accuracy given mild cueing.  > NOT ADDRESSED.  > GOAL STATUS: Ongoing; making progress    2. Patient will imitate oral movements for basic sound combinations (VC, CV, etc.) with or without vocal production in 9/10 opportunities given 1:1 verbal and visual models.  > NOT ADDRESSED.  > GOAL STATUS: Ongoing; making progress    3. Patient will perform reading comprehension tasks at the single word through single sentence levels using total communication with 80% accuracy given mild cueing.  > ADDRESSED: Informally, patient utilized icon labels to help him select correct responses during picture description activity. Accuracy of patient reading comprehension of words difficult to ascertain.   > GOAL STATUS: Ongoing; making progress    4. Patient will perform written expression tasks at the single word level with 80% accuracy given mild cueing.  > NOT ADDRESSED.  > GOAL STATUS: Ongoing; making progress    5. Patient will perform expressive language tasks using total communication with 90% accuracy given mild cueing.  > ADDRESSED: With help from [employer] remote support, therapist initiated use of the Workspace feature on patient's [employer] device. Workspace feature was used to create sentences including subject+verb to describe pictures during today's visit. Using buttons programmed on patient's SGD, patient formed subject+verb sentences with approx 80% accuracy given mild cueing/prompting (therapist additionally  supported navigation of SGD).  > GOAL STATUS: Ongoing; making progress      Outpatient Education:  Individual(s) Educated: Patient, aide  Verbal Education : Use of TouchTalk workspace for sentence practice  Risk and Benefits Discussed with Patient/Caregiver/Other: yes  Patient/Caregiver Demonstrated Understanding: yes  Plan of Care Discussed and Agreed Upon: yes  Patient Response to Education: Patient/Caregiver Verbalized Understanding of Information, Patient/Caregiver Asked Appropriate Questions

## 2024-11-14 ENCOUNTER — TREATMENT (OUTPATIENT)
Dept: SPEECH THERAPY | Facility: CLINIC | Age: 69
End: 2024-11-14
Payer: MEDICARE

## 2024-11-14 ENCOUNTER — TREATMENT (OUTPATIENT)
Dept: PHYSICAL THERAPY | Facility: CLINIC | Age: 69
End: 2024-11-14
Payer: MEDICARE

## 2024-11-14 DIAGNOSIS — I69.390 APRAXIA FOLLOWING CEREBROVASCULAR ACCIDENT: ICD-10-CM

## 2024-11-14 DIAGNOSIS — R26.89 OTHER ABNORMALITIES OF GAIT AND MOBILITY: Primary | ICD-10-CM

## 2024-11-14 DIAGNOSIS — I63.9 ISCHEMIC CEREBROVASCULAR ACCIDENT (CVA) (MULTI): ICD-10-CM

## 2024-11-14 DIAGNOSIS — I69.351 HEMIPLEGIA AND HEMIPARESIS FOLLOWING CEREBRAL INFARCTION AFFECTING RIGHT DOMINANT SIDE (MULTI): ICD-10-CM

## 2024-11-14 DIAGNOSIS — I69.320 APHASIA FOLLOWING CEREBRAL INFARCTION: ICD-10-CM

## 2024-11-14 PROCEDURE — 92507 TX SP LANG VOICE COMM INDIV: CPT | Mod: GN

## 2024-11-14 PROCEDURE — 97112 NEUROMUSCULAR REEDUCATION: CPT | Mod: GP | Performed by: PHYSICAL THERAPIST

## 2024-11-14 ASSESSMENT — PAIN SCALES - WONG BAKER: WONGBAKER_NUMERICALRESPONSE: NO HURT

## 2024-11-14 ASSESSMENT — PAIN - FUNCTIONAL ASSESSMENT
PAIN_FUNCTIONAL_ASSESSMENT: 0-10
PAIN_FUNCTIONAL_ASSESSMENT: WONG-BAKER FACES

## 2024-11-14 ASSESSMENT — PAIN SCALES - GENERAL: PAINLEVEL_OUTOF10: 0 - NO PAIN

## 2024-11-14 NOTE — PROGRESS NOTES
"Physical Therapy    Physical Therapy Treatment    Patient Name: New Pierre  MRN: [id number]  Today's Date: 11/14/2024  Medicare  Visit number: 2  Time Entry:   Time Calculation  Start Time: 1116  Stop Time: 1145  Time Calculation (min): 29 min     PT Therapeutic Procedures Time Entry  Neuromuscular Re-Education Time Entry: 29       Assessment:   Pt. Returned today with KAFO donned, since pt. Was seen he had the KAFO adjusted however there is no release pully which I think would make it easier to use. Pt. Continues to require 2 person assist to lock KAFO into extension. The patient has retropulsion, severe right hemiparesis and poor standing balance which makes utilization of brace difficult.  Right now I recommended taht the KAFO be utilized for standing practice with the patient when he is utilizing the joel walker, and pt. Will continue to require 2 persons to lock and unlock the brace. Pt. Completed very small shuffling steps with dependent assit today with KAFO donned.       Plan: future w/c evaluation, practice standing and ambulation with KAFO       Current Problem  1. Other abnormalities of gait and mobility  Follow Up In Physical Therapy      2. Ischemic cerebrovascular accident (CVA) (Multi)  Follow Up In Physical Therapy      3. Hemiplegia and hemiparesis following cerebral infarction affecting right dominant side (Multi)  Follow Up In Physical Therapy                Subjective    Pt. Unable to provide verbal response when asked how he is doing due to expressive aphasia, however pt. Nodded as a response. Pt. Shook his head when asked if he had pain today. Pt. Caregiver \"Kathie\" present during visit. She reports that the patient got the KAFO adjusted.  Pt. Spouse \"sam\" provided pt. With letter to bring to therapy to discuss next streps for his custom wheelchair, and notified me taht the patient will be getting botox in his upper body and he is now on a muscle relaxant.      Precautions: HTN, global " aphasia, apraxia     Vital Signs: not taken     Pain  Pain Assessment  Pain Assessment: Salazar-Baker FACES  Salazar-Baker FACES Pain Rating: No hurt    Objective   Cognition: global aphasia, apraxia, executive function deficits     Pt. Arrived to visit in standard wheelchair with KAFO donned     Treatments:    Neuro Re-Ed:  -Sit<>stand x2 with hemiwalker with moderate assistx1 and min-A of a 2nd person with PT locking R knee into extension to lock KAFO  -Standing with hemiwalker with knee brace locking knee into extension 1'x2 with mod-Ax1 with SBA of 2nd person  -functional mobility in // bars with dependent assist, x1 person providing CGA behind pt. With w/c behind pt. And therapist lifting pt. RLE and helping pt. With lateral WS, step length was more of a shuffle of 1-2 inches at a time, performed with mirror for visual feedback    OP EDUCATION:  -see treatment       Goals:  Active       PT Problem       PT Goal 1       Start:  10/18/24    Expected End:  01/14/25       Pt. Caregiver will be independent in completing HEP with pt. By end of POC to manage pt. Symptoms, help pt. Maintain ROM and prevent further functional decline.         PT Goal 2       Start:  10/18/24    Expected End:  01/14/25       Wheelchair evaluation will be performed within current PT POC in order to improve pt. Positioning in his chair, reduce risk of pressure ulcers.         PT Goal 3       Start:  10/18/24    Expected End:  01/14/25       Pt. Caregiver will be able to independently lock and unlock the patient's KAFO in standing in order to improve R knee stability in standing for transfers, ADLS including bathing.

## 2024-11-14 NOTE — PROGRESS NOTES
Speech-Language Pathology    SLP Adult Outpatient Speech-Language Pathology Treatment     Patient Name: New Pierre  MRN: [id number]  Today's Date: 11/14/2024  Time Calculation  Start Time: 1015  Stop Time: 1100  Time Calculation (min): 45 min      Current Problem:         ICD-10-CM    Aphasia following cerebral infarction I69.320    Apraxia following cerebrovascular accident I69.390       SLP Assessment:  SLP TX Intervention Outcome: Making Progress Towards Goals; Goals Reviewed  Treatment Provided: Yes, see Objective for details  Treatment Tolerance: Patient tolerated treatment well      Plan:  SLP TX Plan: Continue Plan of Care  SLP Frequency: 1-2x per week  Discussed POC: Patient  Discussed Risks/Benefits: Yes, Patient, caregiver  Patient/Caregiver Agreeable: Yes      Subjective   Patient arrived to and attended treatment accompanied by his aide. He brought his SGD to today's visit.       General Visit Information:  Number of Authorized Treatments : Based on MN (Shea)  Total Number of Visits : 22      Pain Assessment:  Pain Assessment: 0-10  Pain Score: 0 - No pain      Objective     Re-Assessment 10/30/2024:    Oral Mechanism Exam / Cranial Nerve Exam:  Trigeminal Nerve (V)     Jaw ROM: WFL  Facial Nerve (VII)     Asymmetry at rest: Right sided facial weakness     Lip pucker: Reduced (R)     Smile: Reduced (R)     Lip/smile alternation: DNT  Glossopharyngeal, Vagus (IX, X)     Uvula at rest: WFL     Palate at rest: WFL     Palatal elevation /a/: CNT (unable to volitionally vocalize)  Hypoglossal (XII)     Tongue at rest: WFL     Tongue protrusion: WFL     Side to side: Reduced (R)     Tongue elevation: WFL      [place] Diagnostic Aphasia Evaluation (BDAE) Short Form:  --Word Comprehension (auditory): 10/14 (71%) [prev. 8/14 (57%)]  --Word/Picture Match (F/O 4 Words): 8/15 (53%)  --Short Sentence Reading Comprehension (F/O 4 Choices): 2/4 (50%)     Other:  --Yes/No Questions (yes/no via SGD): 5/8 (63%) [prev.  4/6 (67%)]  --Following Commands (1-Step): 1/3 (33%) [prev. 0/2 (0%)]      GOALS: Start date: 10/30/2024; End/re-eval date: 1/28/2025  By discharge:  LTG: Patient will comprehend communication and express information for basic wants/needs/ideas using total communication in order to increase participation in the home and community environments.  > GOAL STATUS: Ongoing; making progress    STGs:   1. Patient will perform auditory comprehension tasks (e.g. yes/no questions, etc.) using total communication with 90% accuracy given mild cueing.  > NOT ADDRESSED. Informally, patient's auditory comprehension skills were required for responding to questions during picture description task (e.g. what is the person in the picture doing?, etc.). He required frequent repetition for best comprehension. During spontaneous conversational exchange today, patient intermittently demo'd evident comprehension of therapist's statements based upon observable changes in his facial expression (e.g. in response to humor, etc.).  > GOAL STATUS: Ongoing; making progress    2. Patient will imitate oral movements for basic sound combinations (VC, CV, etc.) with or without vocal production in 9/10 opportunities given 1:1 verbal and visual models.  > NOT ADDRESSED.  > GOAL STATUS: Ongoing; making progress    3. Patient will perform reading comprehension tasks at the single word through single sentence levels using total communication with 80% accuracy given mild cueing.  > ADDRESSED: Informally, patient utilized icon labels to help him select correct responses during picture description activity. Accuracy of patient reading comprehension of words difficult to ascertain.   > GOAL STATUS: Ongoing; making progress    4. Patient will perform written expression tasks at the single word level with 80% accuracy given mild cueing.  > NOT ADDRESSED.  > GOAL STATUS: Ongoing; making progress    5. Patient will perform expressive language tasks using total  "communication with 90% accuracy given mild cueing.  > ADDRESSED: Therapist initiated use of the Workspace feature on patient's [employer] device again today. Workspace feature was used to create sentences including subject+verb to describe pictures during today's visit. Additionally, folders for object+\"where\" also added in alignment with Response Elaboration (RET) approach. Using buttons programmed on patient's SGD, patient formed subject+verb+object (+\"where\") sentences with approx 80% accuracy given mod-max cueing/prompting (therapist additionally supported navigation of SGD). When patient was visibly unsure of how to respond/formulate a sentence, therapist cued patient to scan icons one by one until he found an icon that seemed to fit the presented picture.  > GOAL STATUS: Ongoing; making progress      Outpatient Education:  Individual(s) Educated: Patient, aide  Verbal Education : Use of TouchTalk workspace for sentence practice; Response Elaboration Training (RET)  Risk and Benefits Discussed with Patient/Caregiver/Other: yes  Patient/Caregiver Demonstrated Understanding: yes  Plan of Care Discussed and Agreed Upon: yes  Patient Response to Education: Patient/Caregiver Verbalized Understanding of Information, Patient/Caregiver Asked Appropriate Questions  "

## 2024-11-19 ENCOUNTER — TREATMENT (OUTPATIENT)
Dept: PHYSICAL THERAPY | Facility: CLINIC | Age: 69
End: 2024-11-19
Payer: MEDICARE

## 2024-11-19 ENCOUNTER — TREATMENT (OUTPATIENT)
Dept: SPEECH THERAPY | Facility: CLINIC | Age: 69
End: 2024-11-19
Payer: MEDICARE

## 2024-11-19 DIAGNOSIS — I69.351 HEMIPLEGIA AND HEMIPARESIS FOLLOWING CEREBRAL INFARCTION AFFECTING RIGHT DOMINANT SIDE (MULTI): ICD-10-CM

## 2024-11-19 DIAGNOSIS — I69.390 APRAXIA FOLLOWING CEREBROVASCULAR ACCIDENT: ICD-10-CM

## 2024-11-19 DIAGNOSIS — R26.89 OTHER ABNORMALITIES OF GAIT AND MOBILITY: Primary | ICD-10-CM

## 2024-11-19 DIAGNOSIS — I63.9 ISCHEMIC CEREBROVASCULAR ACCIDENT (CVA) (MULTI): ICD-10-CM

## 2024-11-19 DIAGNOSIS — I69.320 APHASIA FOLLOWING CEREBRAL INFARCTION: ICD-10-CM

## 2024-11-19 PROCEDURE — 97112 NEUROMUSCULAR REEDUCATION: CPT | Mod: GP | Performed by: PHYSICAL THERAPIST

## 2024-11-19 PROCEDURE — 97530 THERAPEUTIC ACTIVITIES: CPT | Mod: GP | Performed by: PHYSICAL THERAPIST

## 2024-11-19 PROCEDURE — 92507 TX SP LANG VOICE COMM INDIV: CPT | Mod: GN

## 2024-11-19 ASSESSMENT — PAIN - FUNCTIONAL ASSESSMENT
PAIN_FUNCTIONAL_ASSESSMENT: WONG-BAKER FACES
PAIN_FUNCTIONAL_ASSESSMENT: 0-10

## 2024-11-19 ASSESSMENT — PAIN SCALES - GENERAL: PAINLEVEL_OUTOF10: 0 - NO PAIN

## 2024-11-19 ASSESSMENT — PAIN SCALES - WONG BAKER: WONGBAKER_NUMERICALRESPONSE: NO HURT

## 2024-11-19 NOTE — PROGRESS NOTES
Physical Therapy    Physical Therapy Treatment    Patient Name: New Pierre  MRN: [id number]  Today's Date: 11/19/2024  Medicare  Visit number: 3  Time Entry:   Time Calculation  Start Time: 1615  Stop Time: 1708  Time Calculation (min): 53 min             Assessment:   Pt. Continues to require 2 persons to utilize KAFO.  The patient's spouse and one of his caregivers were present during today's visit.  The patient's spouse reports that the patient was able to stand about 5 minutes before receiving his KAFO.  Currently the patient is standing about 5 minutes with his KAFO donned.  Discussed that the KAFO is not practical for transfers at this time. We will trial some visits without the KAFO to see if the patient performs better without it.  The KAFO is hard to lock into full extension and also adds weight to his hemiparetic side which makes it hard to lift. Pt. Completed functional mobility with KRISH walker over a 7 ft. Distance today requiring assist of 3 persons to stand up to KRISH walker.  The patient required min-A x1 for trunk stability and max-A x1 of a 2nd person to lift RLE in swing phase of gait/ providing trunk supportwith a 3rd providing close wheelchair follow. Pt. Will benefit from further practice with KRISH walker to see if this will improve his standing strength, transfers and balance.     Plan: future w/c evaluation, KRISH walker       Current Problem  1. Other abnormalities of gait and mobility  Follow Up In Physical Therapy      2. Ischemic cerebrovascular accident (CVA) (Multi)  Follow Up In Physical Therapy      3. Hemiplegia and hemiparesis following cerebral infarction affecting right dominant side (Multi)  Follow Up In Physical Therapy                Subjective    Pt. Spouse reports that the patient has more motion in his right foot. He stood for 3 minutes on Saturday with the hemiwalker    Precautions: HTN, global aphasia, apraxia, high fall risk     Vital Signs: not taken     Pain  Pain  Assessment  Pain Assessment: Salazar-Baker FACES  Salazar-Baker FACES Pain Rating: No hurt    Objective   Cognition: global aphasia, apraxia, executive function deficits     Pt. Arrived to visit in standard wheelchair with KAFO donned  Sit<>stand=moderate assist with retropulsion noted, utilizing joel walker once standing     Treatments:    There Act:  -Sit<>stand x2 with hemiwalker with moderate assistx1 and min-A of a 2nd person with PT locking R knee into extension to lock KAFO  -Standing with hemiwalker with knee brace locking knee into extension 2'x3with mod-Ax1 with SBA of 2nd person  Neuro Re-Ed:  -functional mobility with KRISH walker, 3 person assist with x1 person providing min-A at trunk, x1 person providing max-A at trunk and assist to lift RLE in swing phase and 3rd person providing w/c follow 7' distance x1    OP EDUCATION:  -see treatment       Goals:  Active       PT Problem       PT Goal 1       Start:  10/18/24    Expected End:  01/14/25       Pt. Caregiver will be independent in completing HEP with pt. By end of POC to manage pt. Symptoms, help pt. Maintain ROM and prevent further functional decline.         PT Goal 2       Start:  10/18/24    Expected End:  01/14/25       Wheelchair evaluation will be performed within current PT POC in order to improve pt. Positioning in his chair, reduce risk of pressure ulcers.         PT Goal 3       Start:  10/18/24    Expected End:  01/14/25       Pt. Caregiver will be able to independently lock and unlock the patient's KAFO in standing in order to improve R knee stability in standing for transfers, ADLS including bathing.

## 2024-11-19 NOTE — PROGRESS NOTES
Speech-Language Pathology    SLP Adult Outpatient Speech-Language Pathology Treatment     Patient Name: New Pierre  MRN: [id number]  Today's Date: 11/19/2024  Time Calculation  Start Time: 1530  Stop Time: 1615  Time Calculation (min): 45 min      Current Problem:         ICD-10-CM    Aphasia following cerebral infarction I69.320    Apraxia following cerebrovascular accident I69.390       SLP Assessment:  SLP TX Intervention Outcome: Making Progress Towards Goals; Goals Reviewed  Treatment Provided: Yes, see Objective for details  Treatment Tolerance: Patient tolerated treatment well      Plan:  SLP TX Plan: Continue Plan of Care  SLP Frequency: 1-2x per week  Discussed POC: Patient  Discussed Risks/Benefits: Yes, Patient, caregiver  Patient/Caregiver Agreeable: Yes      Subjective   Patient arrived to and attended treatment accompanied by his wife. He brought his SGD to today's visit.       General Visit Information:  Number of Authorized Treatments : Based on MN (Shea)  Total Number of Visits : 23      Pain Assessment:  Pain Assessment: 0-10  Pain Score: 0 - No pain      Objective     Re-Assessment 10/30/2024:    Oral Mechanism Exam / Cranial Nerve Exam:  Trigeminal Nerve (V)     Jaw ROM: WFL  Facial Nerve (VII)     Asymmetry at rest: Right sided facial weakness     Lip pucker: Reduced (R)     Smile: Reduced (R)     Lip/smile alternation: DNT  Glossopharyngeal, Vagus (IX, X)     Uvula at rest: WFL     Palate at rest: WFL     Palatal elevation /a/: CNT (unable to volitionally vocalize)  Hypoglossal (XII)     Tongue at rest: WFL     Tongue protrusion: WFL     Side to side: Reduced (R)     Tongue elevation: WFL      [place] Diagnostic Aphasia Evaluation (BDAE) Short Form:  --Word Comprehension (auditory): 10/14 (71%) [prev. 8/14 (57%)]  --Word/Picture Match (F/O 4 Words): 8/15 (53%)  --Short Sentence Reading Comprehension (F/O 4 Choices): 2/4 (50%)     Other:  --Yes/No Questions (yes/no via SGD): 5/8 (63%) [prev.  4/6 (67%)]  --Following Commands (1-Step): 1/3 (33%) [prev. 0/2 (0%)]      GOALS: Start date: 10/30/2024; End/re-eval date: 1/28/2025  By discharge:  LTG: Patient will comprehend communication and express information for basic wants/needs/ideas using total communication in order to increase participation in the home and community environments.  > GOAL STATUS: Ongoing; making progress    STGs:   1. Patient will perform auditory comprehension tasks (e.g. yes/no questions, etc.) using total communication with 90% accuracy given mild cueing.  > NOT ADDRESSED. Informally, patient's auditory comprehension skills were required for responding to questions during picture description task (e.g. what is the person in the picture doing?, etc.). He required frequent repetition for best comprehension. During spontaneous conversational exchange today, patient intermittently demo'd evident comprehension of therapist's statements based upon observable changes in his facial expression (e.g. in response to humor, etc.).  > GOAL STATUS: Ongoing; making progress    2. Patient will imitate oral movements for basic sound combinations (VC, CV, etc.) with or without vocal production in 9/10 opportunities given 1:1 verbal and visual models.  > NOT ADDRESSED.  > GOAL STATUS: Ongoing; making progress    3. Patient will perform reading comprehension tasks at the single word through single sentence levels using total communication with 80% accuracy given mild cueing.  > ADDRESSED: Informally, patient utilized icon labels to help him select correct responses during picture description activity. Accuracy of patient reading comprehension of words difficult to ascertain.   > GOAL STATUS: Ongoing; making progress    4. Patient will perform written expression tasks at the single word level with 80% accuracy given mild cueing.  > NOT ADDRESSED.  > GOAL STATUS: Ongoing; making progress    5. Patient will perform expressive language tasks using total  "communication with 90% accuracy given mild cueing.  > ADDRESSED: Therapist initiated use of the Workspace feature on patient's [employer] device again today. Workspace feature was used to create sentences including subject+verb+object to describe pictures during today's visit. Response Elaboration (RET) approach was used to expand sentences to include \"where\" information. Using buttons programmed on patient's SGD, patient formed subject+verb+object (+\"where\") sentences with approx 80% accuracy given mod cueing/prompting (therapist additionally supported navigation of SGD, however, this also demo'd improvement with patient engaging in more independent navigation). When patient was visibly unsure of how to respond/formulate a sentence, therapist cued patient to scan icons one by one until he found an icon that seemed to fit the presented picture.  > GOAL STATUS: Ongoing; making progress      Outpatient Education:  Individual(s) Educated: Patient, wife  Verbal Education : Use of [employer] workspace for sentence practice; Response Elaboration Training (RET)  Risk and Benefits Discussed with Patient/Caregiver/Other: yes  Patient/Caregiver Demonstrated Understanding: yes  Plan of Care Discussed and Agreed Upon: yes  Patient Response to Education: Patient/Caregiver Verbalized Understanding of Information, Patient/Caregiver Asked Appropriate Questions  "

## 2024-11-22 ENCOUNTER — TELEPHONE (OUTPATIENT)
Dept: NEUROLOGY | Facility: CLINIC | Age: 69
End: 2024-11-22
Payer: MEDICARE

## 2024-11-22 DIAGNOSIS — R56.9 SEIZURE (MULTI): Primary | ICD-10-CM

## 2024-11-22 NOTE — TELEPHONE ENCOUNTER
Spoke with pt's wife  Eyes were open and fixed.  Last time it occurred, he was having hypoxia.  However, this time, he hadn't eaten anything.  Lasted a few seconds and then he was back to his usual.  EMS summoned, and BP OK, heart rate OK, blood sugar unknown if done.  Pt declined ED.  Appt scheduled for 11/27.  I ordered EEG, though I doubt seizure.

## 2024-11-27 ENCOUNTER — APPOINTMENT (OUTPATIENT)
Dept: NEUROLOGY | Facility: CLINIC | Age: 69
End: 2024-11-27
Payer: MEDICARE

## 2024-11-27 VITALS — DIASTOLIC BLOOD PRESSURE: 65 MMHG | HEART RATE: 69 BPM | TEMPERATURE: 97.8 F | SYSTOLIC BLOOD PRESSURE: 117 MMHG

## 2024-11-27 DIAGNOSIS — I69.351 HEMIPLEGIA AND HEMIPARESIS FOLLOWING CEREBRAL INFARCTION AFFECTING RIGHT DOMINANT SIDE (MULTI): ICD-10-CM

## 2024-11-27 DIAGNOSIS — I69.320 APHASIA FOLLOWING CEREBRAL INFARCTION: ICD-10-CM

## 2024-11-27 DIAGNOSIS — G40.209 PARTIAL SYMPTOMATIC EPILEPSY WITH COMPLEX PARTIAL SEIZURES, NOT INTRACTABLE, WITHOUT STATUS EPILEPTICUS (MULTI): Primary | ICD-10-CM

## 2024-11-27 PROCEDURE — 1160F RVW MEDS BY RX/DR IN RCRD: CPT | Performed by: PSYCHIATRY & NEUROLOGY

## 2024-11-27 PROCEDURE — 99213 OFFICE O/P EST LOW 20 MIN: CPT | Performed by: PSYCHIATRY & NEUROLOGY

## 2024-11-27 PROCEDURE — 1123F ACP DISCUSS/DSCN MKR DOCD: CPT | Performed by: PSYCHIATRY & NEUROLOGY

## 2024-11-27 PROCEDURE — 3074F SYST BP LT 130 MM HG: CPT | Performed by: PSYCHIATRY & NEUROLOGY

## 2024-11-27 PROCEDURE — 3078F DIAST BP <80 MM HG: CPT | Performed by: PSYCHIATRY & NEUROLOGY

## 2024-11-27 PROCEDURE — 1159F MED LIST DOCD IN RCRD: CPT | Performed by: PSYCHIATRY & NEUROLOGY

## 2024-11-27 PROCEDURE — 1036F TOBACCO NON-USER: CPT | Performed by: PSYCHIATRY & NEUROLOGY

## 2024-11-27 NOTE — PROGRESS NOTES
NEUROLOGY OUTPATIENT FOLLOW-UP NOTE    Assessment/Plan   Diagnoses and all orders for this visit:  Partial symptomatic epilepsy with complex partial seizures, not intractable, without status epilepticus (Multi)  Hemiplegia and hemiparesis following cerebral infarction affecting right dominant side (Multi)  Aphasia following cerebral infarction      IMPRESSION:  Probable single complex partial seizure in the context of residual right hemiplegia and aphasia from large LMCA-distribution infarct.    PLAN:  Awaiting EEG, will likely start levetiracetam afterward.  I will follow up with him after the EEG.      Bean Vu Jr., M.D., FAAN   ----------    Subjective     New Pierre is a 69 y.o. year old male here for early follow-up.    Six days ago, he had an episode in which he was found staring off into space to the right, for about 30 seconds witnessed.  EMS was summoned and at their arrival, glucose, blood pressure, and vitals were not significantly abnormal.  He declined transport to the hospital.  No other recent illness.    He saw Dr. Vasquez and botulinum toxin is planned.  On baclofen, he is able to keep his right arm extended better; his had is in his lap instead of by his chest as at the last visit.    No past medical history on file.  No past surgical history on file.  Social History     Tobacco Use    Smoking status: Never    Smokeless tobacco: Never   Substance Use Topics    Alcohol use: Never     family history is not on file.    Current Outpatient Medications:     aspirin 325 mg tablet, Take 1 tablet (325 mg) by mouth once daily., Disp: , Rfl:     atorvastatin (Lipitor) 40 mg tablet, Take 1 tablet (40 mg) by mouth once daily., Disp: 90 tablet, Rfl: 1    baclofen (Lioresal) 10 mg tablet, Take 0.5 tablets (5 mg) by mouth once daily at bedtime for 7 days, THEN 1 tablet (10 mg) once daily at bedtime., Disp: 94 tablet, Rfl: 0    metroNIDAZOLE (Metrogel) 0.75 % gel, Apply topically 2 times a day. Apply  twice daily to rosacea, Disp: 45 g, Rfl: 2    QUEtiapine (SEROquel) 25 mg tablet, Take 1 tablet (25 mg) by mouth once daily at bedtime., Disp: 90 tablet, Rfl: 1    sertraline (Zoloft) 100 mg tablet, Take 1 tablet (100 mg) by mouth once daily., Disp: 100 tablet, Rfl: 1    triamcinolone (Kenalog) 0.1 % cream, APPLY TOPICALLY 2 TIMES A DAY. APPLY TO AFFECTED AREA 1-2 TIMES DAILY AS NEEDED., Disp: 30 g, Rfl: 0  Allergies   Allergen Reactions    Chlorhexidine Unknown       Objective     /65   Pulse 69   Temp 36.6 °C (97.8 °F)     CONSTITUTIONAL:  No acute distress     CARDIOVASCULAR:  Normal pulses in the distal legs, mild edema of the right leg, but no edema of either arm or the left leg.  No carotid bruits.     MENTAL STATUS:  Awake, alert.     SPEECH AND LANGUAGE:  Mute.  Can demonstrate function of objects when asked.  Follows most commands, has some difficulty with complex commands.     FUNDOSCOPIC:  No papilledema     CRANIAL NERVES:  II-Vision present, has right hemanopsia     III/IV/VI--EOMs are present in all directions.  Pupils are symmetrically reactive in dim light.  Minor right ptosis.     V--Normal facial sensation.     VII--Right facial asymmetry involving lips, right eyelid does not close fully, and slight involvement of forehead.     VIII--Hearing present to voice bilaterally.     IX/X--Symmetric soft palate rise.     XI--Right trapezius is plegic, left has normal power .     XII--Tongue protrudes without deviation.     MOTOR:  Spastic right hemiplegia.  Partial contractures in elbow, wrist, and finger flexion, but I can fully extend the right arm at the elbow and the fingers.  Further, he can now keep his right hand by his leg, rather than by his chest as at the last visit.  No contractures in the right leg.  Normal power, tone, and bulk in the left arm and leg.     SENSORY:  Present pin sensation in both arms and both legs, reduced in the right leg.     COORDINATION:  Normal finger-to-nose and  heel-to-shin testing in the left arm and leg, unable in the right limbs because of plegia.     REFLEXES are brisker on the right limbs than the left.  The plantar responses are flexor.     GAIT deferred because of hemiplegia.      Bean Vu Jr., M.D., [facility]

## 2024-12-03 ENCOUNTER — PATIENT OUTREACH (OUTPATIENT)
Dept: PRIMARY CARE | Facility: CLINIC | Age: 69
End: 2024-12-03
Payer: MEDICARE

## 2024-12-03 DIAGNOSIS — I69.351 HEMIPLEGIA AND HEMIPARESIS FOLLOWING CEREBRAL INFARCTION AFFECTING RIGHT DOMINANT SIDE (MULTI): ICD-10-CM

## 2024-12-03 DIAGNOSIS — I63.9 ISCHEMIC CEREBROVASCULAR ACCIDENT (CVA) (MULTI): ICD-10-CM

## 2024-12-03 NOTE — PROGRESS NOTES
Chart Reviewed Prior to Patient Outreach     Spoke with Patient's Wife Zabrina Today.     General:  Pt had a focal seizure on 11/21. He was evaluated by EMS and he declined to go to the ED. His Neurologist Dr. Vu has ordered an EEG that is scheduled for 12/10/24 at [facility]. He continues to work with PT and SLP at the [facility] in [place] and they both note that he is making progress. Patient's current Short Term Goal is to be able to ascend the stairs in his house so he can shower in their upstairs bathroom and sleep in his own bed. He and Zabrina's 33rd wedding anniversary is on 1/11/2025 and he is aiming to accomplish this goal on that date.     Neurological:   As noted above, pt had a focal seizure on 11/21/24. His Neurologist has ordered an EEG for 12/10/24.       Medications:  We reviewed New's medications today and Zabrina states that there have been no changes since last month's call.     Current Medications:  atorvastatin (Lipitor) 40 mg tablet-Take 1 tablet (40 mg) by mouth once daily.     QUEtiapine (SEROquel) 25 mg tablet-Take 1 tablet (25 mg) by mouth once daily at bedtime.     sertraline (Zoloft) 100 mg tablet- Take 1 tablet (100 mg) by mouth once daily.     baclofen (Lioresal) 10 mg tablet- Take 0.5 tablets (5 mg) by mouth once daily at bedtime for 7 days, THEN 1 tablet (10 mg) once daily at bedtime.     triamcinolone (Kenalog) 0.1 % cream-APPLY TOPICALLY 2 TIMES A DAY. APPLY TO AFFECTED AREA 1-2 TIMES DAILY AS NEEDED. Pt is not taking this as it causes skin irritation per pt's wife.     metroNIDAZOLE (Metrogel) 0.75 % gel-Apply topically 2 times a day. Apply twice daily to rosacea     Needs:   Pt's wife Zabrina states that New received a Jury Summons and will need a letter from Dr. Foreman excusing him from Jury Duty. She plans to send this into the office via [employer]. She has also had difficulty in securing a home health aide for Sundays and may need  some assistance with this in the future.

## 2024-12-04 ENCOUNTER — TREATMENT (OUTPATIENT)
Dept: SPEECH THERAPY | Facility: CLINIC | Age: 69
End: 2024-12-04
Payer: MEDICARE

## 2024-12-04 DIAGNOSIS — I69.390 APRAXIA FOLLOWING CEREBROVASCULAR ACCIDENT: ICD-10-CM

## 2024-12-04 DIAGNOSIS — I69.320 APHASIA FOLLOWING CEREBRAL INFARCTION: ICD-10-CM

## 2024-12-04 PROCEDURE — 92507 TX SP LANG VOICE COMM INDIV: CPT | Mod: GN

## 2024-12-04 ASSESSMENT — PAIN - FUNCTIONAL ASSESSMENT: PAIN_FUNCTIONAL_ASSESSMENT: 0-10

## 2024-12-04 ASSESSMENT — PAIN SCALES - GENERAL: PAINLEVEL_OUTOF10: 0 - NO PAIN

## 2024-12-04 NOTE — PROGRESS NOTES
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 He brought his SGD to today's visit. When asked what patient had for Thanksgiving dinner (shared by wife at last session), patient correctly navigated to the answer (lasagna) on his device with mild trial/error.      General Visit Information:  Number of Authorized Treatments : Based on MN (Shea)  Total Number of Visits : 24      Pain Assessment:  Pain Assessment: 0-10  Pain Score: 0 - No pain      Objective     Re-Assessment 10/30/2024:    Oral Mechanism Exam / Cranial Nerve Exam:  Trigeminal Nerve (V)     Jaw ROM: WFL  Facial Nerve (VII)     Asymmetry at rest: Right sided facial weakness     Lip pucker: Reduced (R)     Smile: Reduced (R)     Lip/smile alternation: DNT  Glossopharyngeal, Vagus (IX, X)     Uvula at rest: WFL     Palate at rest: WFL     Palatal elevation /a/: CNT (unable to volitionally vocalize)  Hypoglossal (XII)     Tongue at rest: WFL     Tongue protrusion: WFL     Side to side: Reduced (R)     Tongue elevation: WFL      [place] Diagnostic Aphasia Evaluation (BDAE) Short Form:  --Word Comprehension (auditory): 10/14 (71%) [prev. 8/14  (57%)]  --Word/Picture Match (F/O 4 Words): 8/15 (53%)  --Short Sentence Reading Comprehension (F/O 4 Choices): 2/4 (50%)     Other:  --Yes/No Questions (yes/no via SGD): 5/8 (63%) [prev. 4/6 (67%)]  --Following Commands (1-Step): 1/3 (33%) [prev. 0/2 (0%)]      GOALS: Start date: 10/30/2024; End/re-eval date: 1/28/2025  By discharge:  LTG: Patient will comprehend communication and express information for basic wants/needs/ideas using total communication in order to increase participation in the home and community environments.  > GOAL STATUS: Ongoing; making progress    STGs:   1. Patient will perform auditory comprehension tasks (e.g. yes/no questions, etc.) using total communication with 90% accuracy given mild cueing.  > NOT ADDRESSED. Informally, patient's auditory comprehension skills were required for responding to questions during picture description task (e.g. what is the person in the picture doing?, etc.). He required mild repetition for best comprehension. During spontaneous conversational exchange today, patient intermittently demo'd evident comprehension of therapist's statements based upon observable changes in his facial expression (e.g. in response to humor, etc.).  > GOAL STATUS: Ongoing; making progress    2. Patient will imitate oral movements for basic sound combinations (VC, CV, etc.) with or without vocal production in 9/10 opportunities given 1:1 verbal and visual models.  > NOT ADDRESSED.  > GOAL STATUS: Ongoing; making progress    3. Patient will perform reading comprehension tasks at the single word through single sentence levels using total communication with 80% accuracy given mild cueing.  > ADDRESSED: Informally, patient utilized icon labels to help him select correct responses during picture description activity. Accuracy of patient reading comprehension of words is difficult to ascertain.   > GOAL STATUS: Ongoing; making progress    4. Patient will perform written expression tasks at  "the single word level with 80% accuracy given mild cueing.  > ADDRESSED: Therapist asked patient to type his name using the keyboard feature on his SGD. This was targeted months ago in treatment but has not been formally addressed in therapy since that time. Patient's wife and aides have been practicing typing skills with him. Without any prior cueing or word exposure, patient typed 4/5 letters of his name independently, 5/5 given minimal cueing.  > GOAL STATUS: Ongoing; making progress    5. Patient will perform expressive language tasks using total communication with 90% accuracy given mild cueing.  > ADDRESSED: Therapist initiated use of the Workspace feature on patient's dakick device again today. Workspace feature was used to create sentences including subject+verb+object to describe pictures during today's visit. Response Elaboration (RET) approach was used to expand sentences to include \"where\" information. Using buttons programmed on patient's SGD, patient formed subject+verb+object (+\"where\") sentences with approx 80% accuracy given mod cueing/prompting (therapist additionally supported navigation of SGD, however, this also demo'd maintenance of improvement with patient engaging in more independent navigation). When patient was visibly unsure of how to respond/formulate a sentence, therapist cued patient to scan icons one by one until he found an icon that seemed to fit the presented picture.  > GOAL STATUS: Ongoing; making progress      Outpatient Education:  Individual(s) Educated: Patient, aide  Verbal Education : Use of [employer] workspace for sentence practice; Response Elaboration Training (RET)  Risk and Benefits Discussed with Patient/Caregiver/Other: yes  Patient/Caregiver Demonstrated Understanding: yes  Plan of Care Discussed and Agreed Upon: yes  Patient Response to Education: Patient/Caregiver Verbalized Understanding of Information, Patient/Caregiver Asked Appropriate " Questions

## 2024-12-10 ENCOUNTER — TREATMENT (OUTPATIENT)
Dept: PHYSICAL THERAPY | Facility: CLINIC | Age: 69
End: 2024-12-10
Payer: MEDICARE

## 2024-12-10 ENCOUNTER — HOSPITAL ENCOUNTER (OUTPATIENT)
Dept: NEUROLOGY | Facility: HOSPITAL | Age: 69
Discharge: HOME | End: 2024-12-10
Payer: MEDICARE

## 2024-12-10 DIAGNOSIS — R26.89 OTHER ABNORMALITIES OF GAIT AND MOBILITY: Primary | ICD-10-CM

## 2024-12-10 DIAGNOSIS — I69.351 HEMIPLEGIA AND HEMIPARESIS FOLLOWING CEREBRAL INFARCTION AFFECTING RIGHT DOMINANT SIDE (MULTI): ICD-10-CM

## 2024-12-10 DIAGNOSIS — R56.9 SEIZURE (MULTI): ICD-10-CM

## 2024-12-10 DIAGNOSIS — I63.9 ISCHEMIC CEREBROVASCULAR ACCIDENT (CVA) (MULTI): ICD-10-CM

## 2024-12-10 PROCEDURE — 97542 WHEELCHAIR MNGMENT TRAINING: CPT | Mod: GP | Performed by: PHYSICAL THERAPIST

## 2024-12-10 PROCEDURE — 95819 EEG AWAKE AND ASLEEP: CPT | Performed by: PSYCHIATRY & NEUROLOGY

## 2024-12-10 PROCEDURE — 95819 EEG AWAKE AND ASLEEP: CPT

## 2024-12-10 ASSESSMENT — PAIN SCALES - WONG BAKER: WONGBAKER_NUMERICALRESPONSE: NO HURT

## 2024-12-10 ASSESSMENT — PAIN - FUNCTIONAL ASSESSMENT: PAIN_FUNCTIONAL_ASSESSMENT: 0-10

## 2024-12-10 NOTE — PROGRESS NOTES
Physical Therapy    Physical Therapy Treatment/Wheelchair Assessment    Patient Name: New Pierre  MRN: [id number]  Today's Date: 12/10/2024  Medicare  Visit number: 4  Time Entry:   Time Calculation  Start Time: 1535  Stop Time: 1615  Time Calculation (min): 40 min     PT Therapeutic Procedures Time Entry  Wheelchair Management Time Entry: 40       Assessment:   Wheelchair evaluation performed with MICHAEL Carty.  The patient's current standard wheelchair does not fit the patient's needs due to it being to narrow.  The patient is not safe to use a walker, POC, crutches, cane to ambulate due to severe right hemiparesis and high fall risk.  The patient will benefit from a tilt in space wheelchair to reduce risk of skin breakdown and make it easier for his caregivers to assist with scooting into his seat for proper positioning.  The patient is unable to self position due to severity of right hemiparesis.  The patient presents with abnormal posture right sided pelvic obliquity, right hip external rotation and excessive posterior pelvic tilt.       Plan: KRISH walker       Current Problem  1. Other abnormalities of gait and mobility  Follow Up In Physical Therapy      2. Ischemic cerebrovascular accident (CVA) (Multi)  Follow Up In Physical Therapy      3. Hemiplegia and hemiparesis following cerebral infarction affecting right dominant side (Multi)  Follow Up In Physical Therapy                Subjective    Pt. Spouse reports that he uses his wheelchair to get around by crossing his legs and using his left arm.  She is worried about a new wheelchair not fitting in their home.  Pt. Spouse and pt. Caregiver present during full visit.    Precautions: HTN, global aphasia, apraxia, high fall risk     Vital Signs: not taken     Pain  Pain Assessment  Pain Assessment: 0-10  Salazar-Baker FACES Pain Rating: No hurt    Objective   Cognition: global aphasia, apraxia, executive function deficits     Posture: right sided pelvic obliquity,  excessive posterior tilt, R hip in external rotation, RUE held into flexion of the elbow and pronation of the wrist, hand in hand flexion    Observation: pt. Arrived to visit in wheelchair requiring dependent assist  Stand pivot transfer: moderate assist with PT blocking R knee  Seated balance: good without upper extremity support  Standing balance: poor, requires moderate assist and joel-cane to maintain upright, retropulsion noted in standing    MMT: this may not be an accurate measure of pt. Strength due to pt. With global aphasia and difficulty understanding directions of manual muscle testing  Muscle Right LE Left LE    Hip Flexion (L1-L2) 0/5 4/5   Hip Abduction 0/5 4+/5   Knee Flexion (L3) 0/5 4/5   Knee Extension (L4) 0/5 4+/5   Ankle Dorsiflexion (L5) 0/5 5/5   Ankle Plantarflexion (S1) 0/5 4/5     OBSERVATION: R UE flexor synergy   LE ROM:  R ankle DF ROM=-10'   R knee extension=-5'     OP EDUCATION:  -educated pt. Spouse and caregiver on benefits of tilt in space mechanism for wheelchair to reduce caregiver burden of helping pt. To reposition in his wheelchair  -discussed benefits of a new custom wheelchair seat in order to reduce risk of pressure ulcers and fit patient properly as his current chair is too small         Goals:  Active       PT Problem       PT Goal 1       Start:  10/18/24    Expected End:  01/14/25       Pt. Caregiver will be independent in completing HEP with pt. By end of POC to manage pt. Symptoms, help pt. Maintain ROM and prevent further functional decline.         PT Goal 2       Start:  10/18/24    Expected End:  01/14/25       Wheelchair evaluation will be performed within current PT POC in order to improve pt. Positioning in his chair, reduce risk of pressure ulcers.         PT Goal 3       Start:  10/18/24    Expected End:  01/14/25       Pt. Caregiver will be able to independently lock and unlock the patient's KAFO in standing in order to improve R knee stability in standing  for transfers, ADLS including bathing.

## 2024-12-11 ENCOUNTER — DOCUMENTATION (OUTPATIENT)
Dept: SPEECH THERAPY | Facility: CLINIC | Age: 69
End: 2024-12-11
Payer: MEDICARE

## 2024-12-11 NOTE — PROGRESS NOTES
Speech-Language Pathology                 Therapy Communication Note    Patient Name: New Pierre  MRN: [id number]  Today's Date: 12/11/2024     Discipline: Speech Language Pathology    Missed Visit Reason: Patient was a no show/no call to his scheduled Speech Therapy appointment this date.    Missed Time: No Show

## 2024-12-13 DIAGNOSIS — G40.209 PARTIAL SYMPTOMATIC EPILEPSY WITH COMPLEX PARTIAL SEIZURES, NOT INTRACTABLE, WITHOUT STATUS EPILEPTICUS (MULTI): Primary | ICD-10-CM

## 2024-12-13 RX ORDER — LEVETIRACETAM 500 MG/1
500 TABLET ORAL 2 TIMES DAILY
Qty: 60 TABLET | Refills: 3 | Status: SHIPPED | OUTPATIENT
Start: 2024-12-13 | End: 2025-04-12

## 2024-12-17 ENCOUNTER — TREATMENT (OUTPATIENT)
Dept: PHYSICAL THERAPY | Facility: CLINIC | Age: 69
End: 2024-12-17
Payer: MEDICARE

## 2024-12-17 ENCOUNTER — TREATMENT (OUTPATIENT)
Dept: SPEECH THERAPY | Facility: CLINIC | Age: 69
End: 2024-12-17
Payer: MEDICARE

## 2024-12-17 DIAGNOSIS — I63.9 ISCHEMIC CEREBROVASCULAR ACCIDENT (CVA) (MULTI): ICD-10-CM

## 2024-12-17 DIAGNOSIS — I69.390 APRAXIA FOLLOWING CEREBROVASCULAR ACCIDENT: ICD-10-CM

## 2024-12-17 DIAGNOSIS — G81.11 RIGHT SPASTIC HEMIPLEGIA (MULTI): ICD-10-CM

## 2024-12-17 DIAGNOSIS — I69.351 HEMIPLEGIA AND HEMIPARESIS FOLLOWING CEREBRAL INFARCTION AFFECTING RIGHT DOMINANT SIDE (MULTI): ICD-10-CM

## 2024-12-17 DIAGNOSIS — R26.89 OTHER ABNORMALITIES OF GAIT AND MOBILITY: Primary | ICD-10-CM

## 2024-12-17 DIAGNOSIS — I69.320 APHASIA FOLLOWING CEREBRAL INFARCTION: ICD-10-CM

## 2024-12-17 PROCEDURE — 97112 NEUROMUSCULAR REEDUCATION: CPT | Mod: GP | Performed by: PHYSICAL THERAPIST

## 2024-12-17 PROCEDURE — 97530 THERAPEUTIC ACTIVITIES: CPT | Mod: GP | Performed by: PHYSICAL THERAPIST

## 2024-12-17 PROCEDURE — 92507 TX SP LANG VOICE COMM INDIV: CPT | Mod: GN

## 2024-12-17 ASSESSMENT — PAIN SCALES - GENERAL: PAINLEVEL_OUTOF10: 0 - NO PAIN

## 2024-12-17 ASSESSMENT — PAIN - FUNCTIONAL ASSESSMENT: PAIN_FUNCTIONAL_ASSESSMENT: 0-10

## 2024-12-17 NOTE — PROGRESS NOTES
"Physical Therapy    Physical Therapy Treatment    Patient Name: New Pierre  MRN: [id number]  Today's Date: 12/18/2024  Medicare  Visit number: 5  Time Entry:   Time Calculation  Start Time: 1622  Stop Time: 1700  Time Calculation (min): 38 min     PT Therapeutic Procedures Time Entry  Neuromuscular Re-Education Time Entry: 15  Therapeutic Activity Time Entry: 23       Assessment:   Mr. Pierre continues to require moderate assist for balance in standing at times due to retropulsion and requires upper extremity support in standing balance due to severity of right hemiparesis.  Pt. Required 3 person assist for functional mobility with PT provided assistance for swing phase and blocking right knee with 2nd person holding gait belt and 3rd person providing close wheelchair follow. Pt. Was unable to ambulate with only 2 person assist due to fear which led to increased retropulsion.  Progress check next visit.     Plan: progress check next visit       Current Problem  1. Other abnormalities of gait and mobility  Follow Up In Physical Therapy      2. Ischemic cerebrovascular accident (CVA) (Multi)  Follow Up In Physical Therapy      3. Hemiplegia and hemiparesis following cerebral infarction affecting right dominant side (Multi)  Follow Up In Physical Therapy      4. Right spastic hemiplegia (Multi)                  Subjective    Pt. Spouse reports that the patient stood at then laundry room sink, he pulled himself up on his own and he was there for 3-4 minutes.  Pt. Spouse \"Zabrina\" present during full visit    Precautions: HTN, global aphasia, apraxia, high fall risk     Vital Signs: not taken     Pain  Pain Assessment  Pain Assessment: 0-10  0-10 (Numeric) Pain Score: 0 - No pain    Objective   Cognition: global aphasia, apraxia, executive function deficits     Pt. Arrived to visit in standard wheelchair with KAFO nemo  Sit<>stand=moderate assist with retropulsion noted, utilizing joel walker once standing   " "  Treatments:    There Act:  -standing with KRISH walker 2'x2 with mod-A x2 with PT blocking R knee and providing VC to stand \"tall\"  -scooting forward and to the left in wheelchair with minimal assist with BLE's on 4-inch step    Neuro Re-Ed:  -attempted functional mobility with KRISH walker for mod-Ax2 with 2nd person providing close w/c follow and PT assisting at blocking R knee and assisting with swing phase however pt. Was significantly retropulsive and required sitting rest.  -functional mobility with KRISH walker, 3 person assist with x1 person providing min-A at trunk, x1 person providing maximal assist for RLE in swing phase and blocking R knee in stance phase for safety and 3rd person providing w/c follow 7' distance x1    OP EDUCATION:  -static standing daily, sit<>stands       Goals:  Active       PT Problem       PT Goal 1       Start:  10/18/24    Expected End:  01/14/25       Pt. Caregiver will be independent in completing HEP with pt. By end of POC to manage pt. Symptoms, help pt. Maintain ROM and prevent further functional decline.         PT Goal 2       Start:  10/18/24    Expected End:  01/14/25       Wheelchair evaluation will be performed within current PT POC in order to improve pt. Positioning in his chair, reduce risk of pressure ulcers.         PT Goal 3       Start:  10/18/24    Expected End:  01/14/25       Pt. Caregiver will be able to independently lock and unlock the patient's KAFO in standing in order to improve R knee stability in standing for transfers, ADLS including bathing.               "

## 2024-12-17 NOTE — PROGRESS NOTES
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 He brought his SGD to today's visit.      General Visit Information:  Number of Authorized Treatments : Based on MN (Shea)  Total Number of Visits : 25      Pain Assessment:  Pain Assessment: 0-10  Pain Score: 0 - No pain      Objective     Re-Assessment 10/30/2024:    Oral Mechanism Exam / Cranial Nerve Exam:  Trigeminal Nerve (V)     Jaw ROM: WFL  Facial Nerve (VII)     Asymmetry at rest: Right sided facial weakness     Lip pucker: Reduced (R)     Smile: Reduced (R)     Lip/smile alternation: DNT  Glossopharyngeal, Vagus (IX, X)     Uvula at rest: WFL     Palate at rest: WFL     Palatal elevation /a/: CNT (unable to volitionally vocalize)  Hypoglossal (XII)     Tongue at rest: WFL     Tongue protrusion: WFL     Side to side: Reduced (R)     Tongue elevation: WFL      [place] Diagnostic Aphasia Evaluation (BDAE) Short Form:  --Word Comprehension (auditory): 10/14 (71%) [prev. 8/14 (57%)]  --Word/Picture Match (F/O 4 Words): 8/15 (53%)  --Short Sentence Reading Comprehension (F/O 4 Choices): 2/4 (50%)     Other:  --Yes/No Questions (yes/no via SGD): 5/8 (63%) [prev.  4/6 (67%)]  --Following Commands (1-Step): 1/3 (33%) [prev. 0/2 (0%)]      GOALS: Start date: 10/30/2024; End/re-eval date: 1/28/2025  By discharge:  LTG: Patient will comprehend communication and express information for basic wants/needs/ideas using total communication in order to increase participation in the home and community environments.  > GOAL STATUS: Ongoing; making progress    STGs:   1. Patient will perform auditory comprehension tasks (e.g. yes/no questions, etc.) using total communication with 90% accuracy given mild cueing.  > NOT ADDRESSED. Informally, patient's auditory comprehension skills were required for responding to questions during picture description task (e.g. what is the person in the picture doing?, etc.). He required mild repetition for best comprehension. During spontaneous conversational exchange today, patient intermittently demo'd evident comprehension of therapist's statements based upon observable changes in his facial expression (e.g. in response to humor, etc.).  > GOAL STATUS: Ongoing; making progress    2. Patient will imitate oral movements for basic sound combinations (VC, CV, etc.) with or without vocal production in 9/10 opportunities given 1:1 verbal and visual models.  > NOT ADDRESSED.  > GOAL STATUS: Ongoing; making progress    3. Patient will perform reading comprehension tasks at the single word through single sentence levels using total communication with 80% accuracy given mild cueing.  > ADDRESSED: Informally, patient utilized icon labels to help him select correct responses during picture description activity. Accuracy of patient reading comprehension of words is difficult to ascertain. He required mod-max cueing for best use of written labels accompanying pictures.  > GOAL STATUS: Ongoing; making progress    4. Patient will perform written expression tasks at the single word level with 80% accuracy given mild cueing.  > ADDRESSED: Therapist asked patient to type  "his name using the keyboard feature on his SGD. At last visit, patient typed 4/5 letters of his name independently without any prior cueing or word exposure. Today, patient typed \"Aj\" and required mild cueing to type \"New\" correctly. Other words, \"the\" and \"yellow,\" words that patient has practiced typing at home, were also targeted. Patient required max assist for accurate spelling.  > GOAL STATUS: Ongoing; making progress    5. Patient will perform expressive language tasks using total communication with 90% accuracy given mild cueing.  > ADDRESSED: Therapist initiated use of the Workspace feature on patient's [facility] device again today. Workspace feature was used to create sentences including subject+verb+object to describe pictures during today's visit. Using buttons programmed on patient's SGD, patient formed subject+verb+object sentences with approx 80% accuracy given mod cueing/prompting (therapist additionally supported navigation of SGD, however, this also demo'd maintenance of improvement with patient engaging in more independent navigation). When patient was visibly unsure of how to respond/formulate a sentence, therapist cued patient to scan icons one by one and to focus on written word labels accompanying pictures until he found an icon that seemed to fit the presented picture.  > GOAL STATUS: Ongoing; making progress      Outpatient Education:  Individual(s) Educated: Patient, wife  Verbal Education : Summary of session performance; use of Touch[employer] workspace for sentence practice  Risk and Benefits Discussed with Patient/Caregiver/Other: yes  Patient/Caregiver Demonstrated Understanding: yes  Plan of Care Discussed and Agreed Upon: yes  Patient Response to Education: Patient/Caregiver Verbalized Understanding of Information, Patient/Caregiver Asked Appropriate Questions  "

## 2024-12-19 ENCOUNTER — TELEPHONE (OUTPATIENT)
Dept: NEUROLOGY | Facility: CLINIC | Age: 69
End: 2024-12-19
Payer: MEDICARE

## 2024-12-19 ENCOUNTER — APPOINTMENT (OUTPATIENT)
Dept: PHYSICAL THERAPY | Facility: CLINIC | Age: 69
End: 2024-12-19
Payer: MEDICARE

## 2024-12-19 DIAGNOSIS — G40.209 PARTIAL SYMPTOMATIC EPILEPSY WITH COMPLEX PARTIAL SEIZURES, NOT INTRACTABLE, WITHOUT STATUS EPILEPTICUS (MULTI): Primary | ICD-10-CM

## 2024-12-20 RX ORDER — LEVETIRACETAM 100 MG/ML
500 SOLUTION ORAL 2 TIMES DAILY
Qty: 900 ML | Refills: 3 | Status: SHIPPED | OUTPATIENT
Start: 2024-12-20 | End: 2025-12-20

## 2024-12-20 NOTE — TELEPHONE ENCOUNTER
Spoke with pt's wife.  Levetiracetam regular release can't be crushed, and pt can't swallow whole tablets.  He therefore hasn't had the medication all week.  Requested a change to liquid form.  I wrote for it and cancelled the tablet.

## 2024-12-31 ENCOUNTER — TREATMENT (OUTPATIENT)
Dept: PHYSICAL THERAPY | Facility: CLINIC | Age: 69
End: 2024-12-31
Payer: MEDICARE

## 2024-12-31 ENCOUNTER — APPOINTMENT (OUTPATIENT)
Dept: PHYSICAL THERAPY | Facility: CLINIC | Age: 69
End: 2024-12-31
Payer: MEDICARE

## 2024-12-31 DIAGNOSIS — I69.351 HEMIPLEGIA AND HEMIPARESIS FOLLOWING CEREBRAL INFARCTION AFFECTING RIGHT DOMINANT SIDE (MULTI): ICD-10-CM

## 2024-12-31 DIAGNOSIS — I63.9 ISCHEMIC CEREBROVASCULAR ACCIDENT (CVA) (MULTI): ICD-10-CM

## 2024-12-31 DIAGNOSIS — R26.89 OTHER ABNORMALITIES OF GAIT AND MOBILITY: Primary | ICD-10-CM

## 2024-12-31 PROCEDURE — 97530 THERAPEUTIC ACTIVITIES: CPT | Mod: GP | Performed by: PHYSICAL THERAPIST

## 2024-12-31 PROCEDURE — 97112 NEUROMUSCULAR REEDUCATION: CPT | Mod: GP | Performed by: PHYSICAL THERAPIST

## 2024-12-31 ASSESSMENT — PAIN - FUNCTIONAL ASSESSMENT: PAIN_FUNCTIONAL_ASSESSMENT: 0-10

## 2024-12-31 ASSESSMENT — PAIN SCALES - GENERAL: PAINLEVEL_OUTOF10: 0 - NO PAIN

## 2024-12-31 NOTE — PROGRESS NOTES
Physical Therapy    Physical Therapy Treatment/Progress Check    Patient Name: New Pierre  MRN: [id number]  Today's Date: 12/31/2024  Medicare  POC end date: 1/14/25  Visit number: 5  Time Entry:   Time Calculation  Start Time: 1223  Stop Time: 1316  Time Calculation (min): 53 min     PT Therapeutic Procedures Time Entry  Neuromuscular Re-Education Time Entry: 10  Therapeutic Activity Time Entry: 43       Assessment:   Progress check performed today to assess pt. Progress towards his long term goals and determine need for continued physical therapy services.  Mr. Pierre has made excellent progress with his functional transfers and functional mobility capability since beginning of POC.  The patient's POC started off focusing on trailing pt. KAFO device. However due to the weight of the KAFO and difficulty locking the device, it was not efficacious for the patient's standing or walking. Recently, the POC has switched focus to high intensity gait training due to pt. Showing potential to ambulate with assistance.  The patient is ambulating with dependent assist with the KRISH walker and 3 persons to assist, ambulating a total of 40 ft. Today, which the patient has not been able to do in the past.  Mr. Pierre has also improved his low scoot pivot transfers from moderate assist to CGA->minimal assist, which is a sign of excellent functional improvement.   The patient is also requiring less physical assist to shift his weight in his wheelchair.  The patient's performance on his stand pivot transfers was limited today due to pt. Fear of falling. The patient often feels more comfortable with his caregivers that he is more familiar with during transfers which results in pt. Demonstrating reduced performance on transfers and increased retropulsion.  With the patient's current progress I would like to add a new plan of care to continue to improve the patient's function, reduce overall deconditioning, reduce the amount of physical  assist from his caregivers, and provide future CVA prevention.  New goals have been established to accommodate for the patient's improvements thus far.     Plan: high intensity gait training       Current Problem  1. Other abnormalities of gait and mobility  Follow Up In Physical Therapy      2. Ischemic cerebrovascular accident (CVA) (Multi)  Follow Up In Physical Therapy      3. Hemiplegia and hemiparesis following cerebral infarction affecting right dominant side (Multi)  Follow Up In Physical Therapy                Subjective    Pt. Spouse reports that pt. Went up a step with one caregiver's assistance, he held onto a ledge.  He is up to standing once a day, up to 6 minutes, sometimes at the laundry room sink and sometimes with the joel walker.    Precautions: HTN, global aphasia, apraxia, high fall risk     Vital Signs: not taken     Pain  Pain Assessment  Pain Assessment: 0-10  0-10 (Numeric) Pain Score: 0 - No pain    Objective   Cognition: global aphasia, apraxia, executive function deficits     TRANSFERS:  W/C -> Mat squat pivot:  12/31/24= CGA->min-A  IE= moderate assist 1 via squat pivot   Stand pivot transfer from w/c<>mat table:   12/31/24=Max-A with PT blocking R knee   IE=mod-A with PT blocking R knee  W/C<>Car transfer   12/31/24=dependent assist low scoot pivot with x2 persons    FUNCTIONAL MOBILITY:   12/31/24=40'x1 with KRISH walker with dependent assist, PT providing max-A for lifting RLE in swing phase, 2nd person providing CGA and 3rd person providing w/c follow   IE=unable     Treatments:    There Act:  -low scoot pivot transfer from w/c<>mat table CGA->min-A  -stand pivot transfer into vehicle at dependent assist  -stand pivot transfer w/c<>mat table at max-A  -scooting forward and to the left in wheelchair with minimal assist with BLE's on 4-inch step    Neuro Re-Ed:  -40'x1 with KRISH walker with dependent assist, PT providing max-A for lifting RLE in swing phase, 2nd person providing CGA and  3rd person providing w/c follow    OP EDUCATION:  -static standing daily, sit<>stands       Goals:  Active       PT Problem       PT Goal 1       Start:  12/31/24    Expected End:  03/31/25       Pt. Will ambulate 50 ft. With LRAD by end of POC to indicate improved functional mobility endurance.         PT Goal 2       Start:  12/31/24    Expected End:  03/31/25       Pt. Will complete low scoot pivot transfers at [facility] by end of POC to indicate improved independence with transfers, reduce caregiver burden.         PT Goal 3       Start:  12/31/24    Expected End:  03/31/25       Pt. Caregiver and spouse will be able to assist pt. With ambulation with LRAD by end of POC to allow pt. To ambulate in his home, maintain lower extremity strength, bone health and reduce physical deconditioning.         PT Goal 4       Start:  12/31/24    Expected End:  03/31/25       Pt. Will stand with LRAD and minimal assist for 5 minutes to assist with ADLS such as toileting.            Resolved       PT Problem       PT Goal 1 (Met)       Start:  10/18/24    Expected End:  01/14/25    Resolved:  12/31/24    Pt. Caregiver will be independent in completing HEP with pt. By end of POC to manage pt. Symptoms, help pt. Maintain ROM and prevent further functional decline.         PT Goal 2 (Met)       Start:  10/18/24    Expected End:  01/14/25    Resolved:  12/31/24    Wheelchair evaluation will be performed within current PT POC in order to improve pt. Positioning in his chair, reduce risk of pressure ulcers.         PT Goal 3 (Not met)       Start:  10/18/24    Expected End:  01/14/25    Resolved:  12/31/24    Pt. Caregiver will be able to independently lock and unlock the patient's KAFO in standing in order to improve R knee stability in standing for transfers, ADLS including bathing.        Goal Note       NOT MET: due to difficulty locking KAFO

## 2025-01-03 ENCOUNTER — APPOINTMENT (OUTPATIENT)
Dept: SPEECH THERAPY | Facility: CLINIC | Age: 70
End: 2025-01-03
Payer: MEDICARE

## 2025-01-09 ENCOUNTER — TELEPHONE (OUTPATIENT)
Dept: NEUROLOGY | Facility: CLINIC | Age: 70
End: 2025-01-09
Payer: MEDICARE

## 2025-01-16 ENCOUNTER — TREATMENT (OUTPATIENT)
Dept: SPEECH THERAPY | Facility: CLINIC | Age: 70
End: 2025-01-16
Payer: MEDICARE

## 2025-01-16 ENCOUNTER — TREATMENT (OUTPATIENT)
Dept: PHYSICAL THERAPY | Facility: CLINIC | Age: 70
End: 2025-01-16
Payer: MEDICARE

## 2025-01-16 DIAGNOSIS — I69.320 APHASIA FOLLOWING CEREBRAL INFARCTION: ICD-10-CM

## 2025-01-16 DIAGNOSIS — I63.9 ISCHEMIC CEREBROVASCULAR ACCIDENT (CVA) (MULTI): ICD-10-CM

## 2025-01-16 DIAGNOSIS — I69.390 APRAXIA FOLLOWING CEREBROVASCULAR ACCIDENT: ICD-10-CM

## 2025-01-16 DIAGNOSIS — I69.351 HEMIPLEGIA AND HEMIPARESIS FOLLOWING CEREBRAL INFARCTION AFFECTING RIGHT DOMINANT SIDE (MULTI): ICD-10-CM

## 2025-01-16 DIAGNOSIS — G81.11 RIGHT SPASTIC HEMIPLEGIA (MULTI): ICD-10-CM

## 2025-01-16 DIAGNOSIS — R26.89 OTHER ABNORMALITIES OF GAIT AND MOBILITY: Primary | ICD-10-CM

## 2025-01-16 PROCEDURE — 97112 NEUROMUSCULAR REEDUCATION: CPT | Mod: GP | Performed by: PHYSICAL THERAPIST

## 2025-01-16 PROCEDURE — 97530 THERAPEUTIC ACTIVITIES: CPT | Mod: GP | Performed by: PHYSICAL THERAPIST

## 2025-01-16 PROCEDURE — 92507 TX SP LANG VOICE COMM INDIV: CPT | Mod: GN

## 2025-01-16 ASSESSMENT — PAIN SCALES - GENERAL
PAINLEVEL_OUTOF10: 0 - NO PAIN
PAINLEVEL_OUTOF10: 0 - NO PAIN

## 2025-01-16 ASSESSMENT — PAIN - FUNCTIONAL ASSESSMENT
PAIN_FUNCTIONAL_ASSESSMENT: 0-10
PAIN_FUNCTIONAL_ASSESSMENT: 0-10

## 2025-01-16 NOTE — PROGRESS NOTES
Speech-Language Pathology    SLP Adult Outpatient Speech-Language Pathology Treatment     Patient Name: New Pierre  MRN: [id number]  Today's Date: 1/16/2025  Time Calculation  Start Time: 1545  Stop Time: 1630  Time Calculation (min): 45 min       Current Problem:         ICD-10-CM    Aphasia following cerebral infarction I69.320    Apraxia following cerebrovascular accident I69.390       SLP Assessment:  SLP TX Intervention Outcome: Making Progress Towards Goals  Treatment Provided: Yes, see Objective for details  Treatment Tolerance: Patient tolerated treatment well      Plan:  SLP TX Plan: Continue Plan of Care  SLP Frequency: 1x per week  Discussed POC: Patient  Discussed Risks/Benefits: Yes, Patient, caregiver  Patient/Caregiver Agreeable: Yes  Next Session Plan: Re-assess      Subjective   Patient arrived to and attended treatment accompanied by his wife. He brought his SGD to today's visit. SGD was utilized as part of today's treatment. Patient with presence of a cough today that wife reported is sinus related.      General Visit Information:  Number of Authorized Treatments : Based on MN (Shea)  Total Number of Visits : 26      Pain Assessment:  Pain Assessment: 0-10  Pain Score: 0 - No pain      Objective     Re-Assessment 10/30/2024:    Oral Mechanism Exam / Cranial Nerve Exam:  Trigeminal Nerve (V)     Jaw ROM: WFL  Facial Nerve (VII)     Asymmetry at rest: Right sided facial weakness     Lip pucker: Reduced (R)     Smile: Reduced (R)     Lip/smile alternation: DNT  Glossopharyngeal, Vagus (IX, X)     Uvula at rest: WFL     Palate at rest: WFL     Palatal elevation /a/: CNT (unable to volitionally vocalize)  Hypoglossal (XII)     Tongue at rest: WFL     Tongue protrusion: WFL     Side to side: Reduced (R)     Tongue elevation: WFL      [place] Diagnostic Aphasia Evaluation (BDAE) Short Form:  --Word Comprehension (auditory): 10/14 (71%) [prev. 8/14 (57%)]  --Word/Picture Match (F/O 4 Words): 8/15  (53%)  --Short Sentence Reading Comprehension (F/O 4 Choices): 2/4 (50%)     Other:  --Yes/No Questions (yes/no via SGD): 5/8 (63%) [prev. 4/6 (67%)]  --Following Commands (1-Step): 1/3 (33%) [prev. 0/2 (0%)]      GOALS: Start date: 10/30/2024; End/re-eval date: 1/28/2025  By discharge:  LTG: Patient will comprehend communication and express information for basic wants/needs/ideas using total communication in order to increase participation in the home and community environments.  > GOAL STATUS: Ongoing; making progress    STGs:   1. Patient will perform auditory comprehension tasks (e.g. yes/no questions, etc.) using total communication with 90% accuracy given mild cueing.  > NOT ADDRESSED. Informally, patient's auditory comprehension skills were required for responding to questions during picture description task (e.g. what is the person in the picture doing?, etc.). He required mild repetition for best comprehension. During spontaneous conversational exchange today, patient intermittently demo'd evident comprehension of therapist's statements based upon observable changes in his facial expression (e.g. in response to humor, etc.).  > GOAL STATUS: Ongoing; making progress    2. Patient will imitate oral movements for basic sound combinations (VC, CV, etc.) with or without vocal production in 9/10 opportunities given 1:1 verbal and visual models.  > NOT ADDRESSED.  > GOAL STATUS: Ongoing    3. Patient will perform reading comprehension tasks at the single word through single sentence levels using total communication with 80% accuracy given mild cueing.  > ADDRESSED: Informally, patient utilized icon labels to help him select correct responses during picture description activity. Accuracy of patient reading comprehension of words is difficult to ascertain. He required mod-max cueing for best use of written labels accompanying pictures this date.  > GOAL STATUS: Ongoing; making progress    4. Patient will perform  written expression tasks at the single word level with 80% accuracy given mild cueing.  > ADDRESSED: Therapist asked patient to type his name using the keyboard feature on his SGD. Copy and Recall Treatment (CART) was used for typing/spelling of patient's name. Patient copied his name x3, rapidly finding and selecting correct letters for first attempt with minimal assist. In x2/3 opportunities, patient struggled to locate and correctly select letters for copying. Patient was unable to type letters of his name independently for recall step, requiring max assist.  > GOAL STATUS: Ongoing; limited progress    5. Patient will perform expressive language tasks using total communication with 90% accuracy given mild cueing.  > ADDRESSED: Therapist initiated use of the Workspace feature on patient's [employer] device again today. Workspace feature was used to create sentences including subject+verb+object to describe pictures during today's visit. Using buttons programmed on patient's SGD, patient formed subject+verb+object sentences with approx 70% accuracy given mod cueing/prompting (therapist additionally supported navigation of SGD). Patient had most difficulty identifying subject/who in each sentence using icons, especially when icons did not identically match the appearance of the pictured person(s). When patient was visibly unsure of how to respond/formulate a sentence, therapist cued patient to scan icons one by one and to focus on written word labels accompanying pictures until he found an icon that seemed to fit the presented picture.  > GOAL STATUS: Ongoing; limited progress      Outpatient Education:  Individual(s) Educated: Patient, wife  Verbal Education : Summary of session performance; use of TouchTalk workspace for sentence practice; Copy and Recall Treatment (CART) for written expression  Risk and Benefits Discussed with Patient/Caregiver/Other: yes  Patient/Caregiver Demonstrated Understanding:  yes  Plan of Care Discussed and Agreed Upon: yes  Patient Response to Education: Patient/Caregiver Verbalized Understanding of Information, Patient/Caregiver Asked Appropriate Questions

## 2025-01-16 NOTE — PROGRESS NOTES
"Physical Therapy    Physical Therapy Treatment    Patient Name: New Pierre  MRN: [id number]  Today's Date: 1/16/2025  Medicare  POC end date: 3/31/25  Visit number: 5  Time Entry:   Time Calculation  Start Time: 1705  Stop Time: 1745  Time Calculation (min): 40 min     PT Therapeutic Procedures Time Entry  Neuromuscular Re-Education Time Entry: 25  Therapeutic Activity Time Entry: 15       Assessment:   Pt. Demonstrated difficulty with functional mobility at the beginning of the visit. However at trial number 4 the patient was able to progress to completing 20 ft. Of functional mobility at dependent assist. The patient has a high tendency for pain during standing/functional mobility which leads to retropulsion in standing.  The patient requires verbal cues to occasionally push the KRISH walker in a forward direction in order to promote increased anterior WS to accommodate for retropulsion.     Plan: high intensity gait training       Current Problem  1. Other abnormalities of gait and mobility  Follow Up In Physical Therapy      2. Ischemic cerebrovascular accident (CVA) (Multi)  Follow Up In Physical Therapy      3. Hemiplegia and hemiparesis following cerebral infarction affecting right dominant side (Multi)  Follow Up In Physical Therapy      4. Right spastic hemiplegia (Multi)  Follow Up In Physical Therapy                Subjective    Pt. Spouse reports that their caregiver cannot come today because of the weather.  They have a few different caregivers but they dont always come.  Pt. Spouse \"Zabrina\" present during full visit.     Precautions: HTN, global aphasia, apraxia, high fall risk     Vital Signs: not taken     Pain  Pain Assessment  Pain Assessment: 0-10  0-10 (Numeric) Pain Score: 0 - No pain    Objective     Pt. Arrived to visit at dependent assist in wheelchair.     Treatments:    There Act:  -sit<>stands from w/c to standing with KRISH walker x4 reps with max-A x1, CGA of 2nd person  -scooting f/B in w/c " at min-A    Neuro Re-Ed:  -functional mobility 3'x2, 20'x1 with KRISH walker with dependent assist, PT providing max-A for lifting RLE in swing phase, 2nd person providing CGA and 3rd person providing w/c follow    (Pt. Was SOB following functional mobility, post activity pt. HR inc. To 105 and SaO2 was at 96%)    OP EDUCATION:  -static standing daily, sit<>stands       Goals:  Active       PT Problem       PT Goal 1       Start:  12/31/24    Expected End:  03/31/25       Pt. Will ambulate 50 ft. With LRAD by end of POC to indicate improved functional mobility endurance.         PT Goal 2       Start:  12/31/24    Expected End:  03/31/25       Pt. Will complete low scoot pivot transfers at CGA by end of POC to indicate improved independence with transfers, reduce caregiver burden.         PT Goal 3       Start:  12/31/24    Expected End:  03/31/25       Pt. Caregiver and spouse will be able to assist pt. With ambulation with LRAD by end of POC to allow pt. To ambulate in his home, maintain lower extremity strength, bone health and reduce physical deconditioning.         PT Goal 4       Start:  12/31/24    Expected End:  03/31/25       Pt. Will stand with LRAD and minimal assist for 5 minutes to assist with ADLS such as toileting.            Resolved       PT Problem       PT Goal 1 (Met)       Start:  10/18/24    Expected End:  01/14/25    Resolved:  12/31/24    Pt. Caregiver will be independent in completing HEP with pt. By end of POC to manage pt. Symptoms, help pt. Maintain ROM and prevent further functional decline.         PT Goal 2 (Met)       Start:  10/18/24    Expected End:  01/14/25    Resolved:  12/31/24    Wheelchair evaluation will be performed within current PT POC in order to improve pt. Positioning in his chair, reduce risk of pressure ulcers.         PT Goal 3 (Not met)       Start:  10/18/24    Expected End:  01/14/25    Resolved:  12/31/24    Pt. Caregiver will be able to independently lock and  unlock the patient's KAFO in standing in order to improve R knee stability in standing for transfers, ADLS including bathing.        Goal Note       NOT MET: due to difficulty locking KAFO

## 2025-01-21 ENCOUNTER — TREATMENT (OUTPATIENT)
Dept: SPEECH THERAPY | Facility: CLINIC | Age: 70
End: 2025-01-21
Payer: MEDICARE

## 2025-01-21 ENCOUNTER — TREATMENT (OUTPATIENT)
Dept: PHYSICAL THERAPY | Facility: CLINIC | Age: 70
End: 2025-01-21
Payer: MEDICARE

## 2025-01-21 DIAGNOSIS — R26.89 OTHER ABNORMALITIES OF GAIT AND MOBILITY: Primary | ICD-10-CM

## 2025-01-21 DIAGNOSIS — I69.390 APRAXIA FOLLOWING CEREBROVASCULAR ACCIDENT: ICD-10-CM

## 2025-01-21 DIAGNOSIS — I69.351 HEMIPLEGIA AND HEMIPARESIS FOLLOWING CEREBRAL INFARCTION AFFECTING RIGHT DOMINANT SIDE (MULTI): ICD-10-CM

## 2025-01-21 DIAGNOSIS — I69.320 APHASIA FOLLOWING CEREBRAL INFARCTION: ICD-10-CM

## 2025-01-21 DIAGNOSIS — G81.11 RIGHT SPASTIC HEMIPLEGIA (MULTI): ICD-10-CM

## 2025-01-21 DIAGNOSIS — I63.9 ISCHEMIC CEREBROVASCULAR ACCIDENT (CVA) (MULTI): ICD-10-CM

## 2025-01-21 PROCEDURE — 97530 THERAPEUTIC ACTIVITIES: CPT | Mod: GP | Performed by: PHYSICAL THERAPIST

## 2025-01-21 PROCEDURE — 92507 TX SP LANG VOICE COMM INDIV: CPT | Mod: GN

## 2025-01-21 PROCEDURE — 97112 NEUROMUSCULAR REEDUCATION: CPT | Mod: GP | Performed by: PHYSICAL THERAPIST

## 2025-01-21 ASSESSMENT — PAIN - FUNCTIONAL ASSESSMENT
PAIN_FUNCTIONAL_ASSESSMENT: 0-10
PAIN_FUNCTIONAL_ASSESSMENT: 0-10

## 2025-01-21 ASSESSMENT — PAIN SCALES - GENERAL
PAINLEVEL_OUTOF10: 0 - NO PAIN
PAINLEVEL_OUTOF10: 0 - NO PAIN

## 2025-01-21 NOTE — PROGRESS NOTES
Physical Therapy    Physical Therapy Treatment    Patient Name: New Pierre  MRN: [id number]  Today's Date: 1/22/2025  Medicare  POC end date: 3/31/25  Visit number: 8  Time Entry:   Time Calculation  Start Time: 1630  Stop Time: 1715  Time Calculation (min): 45 min     PT Therapeutic Procedures Time Entry  Neuromuscular Re-Education Time Entry: 30  Therapeutic Activity Time Entry: 10       Assessment:   Mr. Pierre is demonstrating improved confidence and independence during transfers. Pt. Reported that today the patient transferred from his wheelchair to the chair at the optometry office which he has never done before.  The patient ambulated 3 separate bouts this date, continuing to require 3 person assist to complete this task. The patient requires PT assist to lift RLE in swing phase and to push the walker forward. Pt. Continues to demonstrate a high degree of retropulsion which makes it difficult for him to push the walker anteriorly to get a forward momentum.     Plan: high intensity gait training       Current Problem  1. Other abnormalities of gait and mobility  Follow Up In Physical Therapy      2. Ischemic cerebrovascular accident (CVA) (Multi)  Follow Up In Physical Therapy      3. Hemiplegia and hemiparesis following cerebral infarction affecting right dominant side (Multi)  Follow Up In Physical Therapy      4. Right spastic hemiplegia (Multi)  Follow Up In Physical Therapy                Subjective    Pt. Spouse reports that the patient went to the eye doctor and everything was good.  He transerred to the optometry chair today which has hasn't done since the stroke.    Precautions: HTN, global aphasia, apraxia, high fall risk     Vital Signs: not taken     Pain  Pain Assessment  Pain Assessment: 0-10  0-10 (Numeric) Pain Score: 0 - No pain    Objective     Pt. Arrived to visit at [facility] assist in wheelchair.     Treatments:    There Act:  -sit<>stands from w/c to standing with KRISH walker x4 reps with  max-A x1, CGA of 2nd person  -scooting f/B in w/c at min-A     Neuro Re-Ed:  -functional mobility 20'x1, 30'x1, 15'x1 with KRISH walker with dependent assist, PT providing max-A for lifting RLE in swing phase and pushing walker forward, 2nd person providing min-A and 3rd person providing w/c follow    (HR=99 BPM post activity)     OP EDUCATION:  -static standing daily, sit<>stands       Goals:  Active       PT Problem       PT Goal 1       Start:  12/31/24    Expected End:  03/31/25       Pt. Will ambulate 50 ft. With LRAD by end of POC to indicate improved functional mobility endurance.         PT Goal 2       Start:  12/31/24    Expected End:  03/31/25       Pt. Will complete low scoot pivot transfers at CGA by end of POC to indicate improved independence with transfers, reduce caregiver burden.         PT Goal 3       Start:  12/31/24    Expected End:  03/31/25       Pt. Caregiver and spouse will be able to assist pt. With ambulation with LRAD by end of POC to allow pt. To ambulate in his home, maintain lower extremity strength, bone health and reduce physical deconditioning.         PT Goal 4       Start:  12/31/24    Expected End:  03/31/25       Pt. Will stand with LRAD and minimal assist for 5 minutes to assist with ADLS such as toileting.            Resolved       PT Problem       PT Goal 1 (Met)       Start:  10/18/24    Expected End:  01/14/25    Resolved:  12/31/24    Pt. Caregiver will be independent in completing HEP with pt. By end of POC to manage pt. Symptoms, help pt. Maintain ROM and prevent further functional decline.         PT Goal 2 (Met)       Start:  10/18/24    Expected End:  01/14/25    Resolved:  12/31/24    Wheelchair evaluation will be performed within current PT POC in order to improve pt. Positioning in his chair, reduce risk of pressure ulcers.         PT Goal 3 (Not met)       Start:  10/18/24    Expected End:  01/14/25    Resolved:  12/31/24    Pt. Caregiver will be able to  independently lock and unlock the patient's KAFO in standing in order to improve R knee stability in standing for transfers, ADLS including bathing.        Goal Note       NOT MET: due to difficulty locking KAFO

## 2025-01-23 ENCOUNTER — PATIENT OUTREACH (OUTPATIENT)
Dept: PRIMARY CARE | Facility: CLINIC | Age: 70
End: 2025-01-23
Payer: MEDICARE

## 2025-01-23 DIAGNOSIS — I69.351 HEMIPLEGIA AND HEMIPARESIS FOLLOWING CEREBRAL INFARCTION AFFECTING RIGHT DOMINANT SIDE (MULTI): ICD-10-CM

## 2025-01-23 DIAGNOSIS — I63.9 ISCHEMIC CEREBROVASCULAR ACCIDENT (CVA) (MULTI): ICD-10-CM

## 2025-01-23 PROCEDURE — 99490 CHRNC CARE MGMT STAFF 1ST 20: CPT | Performed by: FAMILY MEDICINE

## 2025-01-23 NOTE — PROGRESS NOTES
Chart Reviewed Prior to Patient Outreach     Spoke with Patient's Wife Zabrina Today.     General:  Per Zabrina, New continues to make good progress with Speech Therapy and Physical Therapy. He was able to transfer from his wheelchair to the examination chair at the Optometrist's office the other day which is a major milestone for him. Zabrina also reports that New did have diarrhea for several days which they felt might be a side effect of the liquid Levetiracetam. She states that this has since resolved. Zabrina relates that they have had extreme difficulty in being able to retain home health aides and Zabrina is not able to move New on her own.     Zabrina states that at New's optometry visit, the Optometrist reviewed his medications and stated that this medication can have consequences for his vision, and he informed Zabrina that she should inquire with Dr. Foreman as to whether or not the Sertraline is providing a meaningful improvement in his mood and if she feels he should continue it given that it may impact his vision. I informed that I would convey that question to Dr. Foreman. Zabrina also reports that New has chronic congestion and post-nasal drip which causes coughing and she is wondering if a nasal spray may improve these symptoms.     Neurological:   Pt had a focal seizure on 11/21/24. Pt underwent an EEG on 12/10/24 which found no seizure activity.     Medications:  We reviewed New's medications today and Zabrina states that New has been prescribed Levetiracetam in liquid form as noted below.     Current Medications:  levETIRAcetam (Keppra) 100 mg/mL solution -Take 5 mL (500 mg) by mouth 2 times a day.     atorvastatin (Lipitor) 40 mg tablet-Take 1 tablet (40 mg) by mouth once daily.     QUEtiapine (SEROquel) 25 mg tablet-Take 1 tablet (25 mg) by mouth once daily at bedtime.     sertraline (Zoloft) 100 mg tablet- Take 1 tablet (100 mg) by mouth once daily.     baclofen (Lioresal) 10 mg tablet- Take 0.5  tablets (5 mg) by mouth once daily at bedtime for 7 days, THEN 1 tablet (10 mg) once daily at bedtime.     triamcinolone (Kenalog) 0.1 % cream-APPLY TOPICALLY 2 TIMES A DAY. APPLY TO AFFECTED AREA 1-2 TIMES DAILY AS NEEDED. Pt is not taking this as it causes skin irritation per pt's wife.     metroNIDAZOLE (Metrogel) 0.75 % gel-Apply topically 2 times a day. Apply twice daily to rosacea     Needs:   Zabrina wishes to discuss the optometrist's concerns regarding Sertraline and New's vision. She also would like to know if Dr. Foreman feels that a nasal spray might improve New's chronic nasal congestion.

## 2025-01-28 ENCOUNTER — TREATMENT (OUTPATIENT)
Dept: PHYSICAL THERAPY | Facility: CLINIC | Age: 70
End: 2025-01-28
Payer: MEDICARE

## 2025-01-28 ENCOUNTER — TREATMENT (OUTPATIENT)
Dept: SPEECH THERAPY | Facility: CLINIC | Age: 70
End: 2025-01-28
Payer: MEDICARE

## 2025-01-28 DIAGNOSIS — R26.89 OTHER ABNORMALITIES OF GAIT AND MOBILITY: Primary | ICD-10-CM

## 2025-01-28 DIAGNOSIS — I69.320 APHASIA FOLLOWING CEREBRAL INFARCTION: ICD-10-CM

## 2025-01-28 DIAGNOSIS — I63.9 ISCHEMIC CEREBROVASCULAR ACCIDENT (CVA) (MULTI): ICD-10-CM

## 2025-01-28 DIAGNOSIS — I69.351 HEMIPLEGIA AND HEMIPARESIS FOLLOWING CEREBRAL INFARCTION AFFECTING RIGHT DOMINANT SIDE (MULTI): ICD-10-CM

## 2025-01-28 DIAGNOSIS — I69.390 APRAXIA FOLLOWING CEREBROVASCULAR ACCIDENT: ICD-10-CM

## 2025-01-28 DIAGNOSIS — G81.11 RIGHT SPASTIC HEMIPLEGIA (MULTI): ICD-10-CM

## 2025-01-28 PROCEDURE — 92507 TX SP LANG VOICE COMM INDIV: CPT | Mod: GN

## 2025-01-28 PROCEDURE — 97110 THERAPEUTIC EXERCISES: CPT | Mod: GP | Performed by: PHYSICAL THERAPIST

## 2025-01-28 ASSESSMENT — PAIN SCALES - GENERAL: PAINLEVEL_OUTOF10: 0 - NO PAIN

## 2025-01-28 ASSESSMENT — PAIN - FUNCTIONAL ASSESSMENT: PAIN_FUNCTIONAL_ASSESSMENT: 0-10

## 2025-01-28 NOTE — PROGRESS NOTES
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       SLP Assessment:  SLP TX Intervention Outcome: Making Progress Towards Goals  Treatment Provided: Yes, see Objective for details  Treatment Tolerance: Patient tolerated treatment well      Plan:  SLP TX Plan: Continue Plan of Care  SLP Frequency: 1x/week  SLP Duration: 12 weeks  Discussed POC: Yes, Patient, wife  Discussed Risks/Benefits: Yes, Patient, wife  Patient/Caregiver Agreeable: Yes  Next Session Plan: Address reading and auditory comprehension for single words and phrases;  address writing/typing for single/closed set of words; HEP      Subjective   Patient arrived to treatment via transportation service and attended therapy independently. Patient brought his SGD and tablet to today's visit. Tablet used during the session.      General Visit Information:  Number of Authorized Treatments : Based on MN (Shea)  Total Number of Visits : 28      Pain Assessment:  Pain Assessment: 0-10  Pain Score: 0 - No pain      Objective     Re-Assessment 1/21/2025:    Oral Mechanism Exam / Cranial Nerve Exam:  Trigeminal Nerve (V)     Jaw ROM: WFL  Facial Nerve (VII)     Asymmetry at rest: Right sided facial weakness     Lip pucker: Reduced (R)     Smile: Reduced (R)     Lip/smile alternation: DNT  Glossopharyngeal, Vagus (IX, X)     Uvula at rest: WFL     Palate at rest: WFL     Palatal elevation /a/: CNT (unable to volitionally vocalize)  Hypoglossal (XII)     Tongue at rest: WFL     Tongue protrusion: WFL     Side to side: Reduced (R)     Tongue elevation: WFL      [place] Diagnostic Aphasia Evaluation (BDAE) Short Form:  --Word  Comprehension (auditory): 8/14 (57%) [prev. 10/14 (71%)]  --Word/Picture Match (F/O 4 Words): 8/15 (53%) [prev. 8/15 (53%)]  --Short Sentence Reading Comprehension (F/O 4 Choices): 2/4 (50%) [prev. 2/4 (50%)]     Other:  --Yes/No Questions (yes/no via SGD): 4/9 (44%) [prev. 5/8 (63%)]  --Following Commands (1-Step): 1/4 (25%) [prev. 1/3 (33%)]  --Written Expression (Typing name from memory): 3/5 letters indep., only 1 letter in correct position    *Limited progress evident across all assessment measures. Therapist requested input from patient's wife re: goal setting. Improved use of and independence with SGD identified as a goal. Patient indicated that he doesn't know how to use his device well vs not wanting to use it.       GOALS: Start date: 1/21/2025; End/re-eval date: 4/21/2025  By discharge:  LTG: Patient will comprehend communication and express information for basic wants/needs/ideas using total communication in order to increase participation in the home and community environments.  > GOAL STATUS: Ongoing    STGs:   1. Patient will perform auditory comprehension tasks (e.g. yes/no questions, etc.) using total communication with 90% accuracy given mild cueing.  > NOT ADDRESSED.  > GOAL STATUS: Ongoing      3. Patient will perform reading comprehension tasks at the single word level with 70% accuracy given mild cueing in order to improve navigation/use of SGD.  > ADDRESSED: Single word and phrase level reading addressed via [facility] and [employer]'s Language Therapy 4-in-1 apps. Patient correctly paired pictured objects to written single words (F/O 4 pictures) with 50% accuracy (3/6) independently and on initial attempt, 100% accuracy on second attempt. He paired 2-3 word phrases with pictures as follows: F/O 2 phrases: 100% (10/10) indep, initial attempt; F/O 3 phrases: 45% (10/23) indep, initial attempt, 97% on second attempt; F/O 4 phrases: 46% (6/13) indep, initial attempt, 85%  (8/13) on second attempt. Significant decline in accuracy when increasing F/O options to >2 was d/t increased similarity of choices with greater options (e.g. F/O 2: pretty face vs sewing machine; F/O 3: pink tongue vs pink coat vs yellow tongue).  > GOAL STATUS: Ongoing; progressing    4. Patient will perform written expression tasks at the single word level for closed set of 5-10 patient/caregiver-selected vocabulary with 70% accuracy given mild cueing in order to improve communicative interactions using SGD.  > NOT ADDRESSED.  > GOAL STATUS: Ongoing      6. Patient will navigate his speech generating device to select appropriate vocabulary items for functional communication in 3 out of 5 opportunities.  > NOT ADDRESSED.  > GOAL STATUS: Ongoing    7. Patient/caregivers will express understanding of and will report adherence to recommended HEP.  > NOT ADDRESSED.  > GOAL STATUS: Ongoing; progressing      Outpatient Education:  Individual(s) Educated: Patient  Verbal Education : Feedback for session performance; HEP instructions re: [facility]  Risk and Benefits Discussed with Patient/Caregiver/Other: yes  Patient/Caregiver Demonstrated Understanding: yes  Plan of Care Discussed and Agreed Upon: yes  Patient Response to Education: Patient/Caregiver Verbalized Understanding of Information, Patient/Caregiver Asked Appropriate Questions

## 2025-01-28 NOTE — PROGRESS NOTES
"Physical Therapy    Physical Therapy Treatment    Patient Name: New Pierre  MRN: [id number]  Today's Date: 1/28/2025  Medicare  POC end date: 3/31/25  Visit number: 9  Time Entry:   Time Calculation  Start Time: 1630  Stop Time: 1715  Time Calculation (min): 45 min     PT Therapeutic Procedures Time Entry  Therapeutic Exercise Time Entry: 45     Assessment:   Today's visit was spent reviewing new seated HEP exercise program in order to improve patient overall activity levels. The patient is at risk for significant deconditioning due to being wheelchair bound. The patient required PT demonstration for some of the seated exercises, however with practice the patient improved. I educated pt. And his caregiver on the importance of pt. Completed \"mobility minutes\" 2-3 minutes every 30 minutes to improve his metabolism and reduce risk of future CVA.     Plan: wheelchair evaluation, review HEP     Current Problem  1. Other abnormalities of gait and mobility  Follow Up In Physical Therapy      2. Ischemic cerebrovascular accident (CVA) (Multi)  Follow Up In Physical Therapy      3. Hemiplegia and hemiparesis following cerebral infarction affecting right dominant side (Multi)  Follow Up In Physical Therapy      4. Right spastic hemiplegia (Multi)  Follow Up In Physical Therapy                Subjective    Pt. Shook his head no when asked if he had pain.      Precautions: HTN, global aphasia, apraxia, high fall risk     Vital Signs: not taken     Pain: 0/10       Objective     Pt. Arrived to visit at [facility] assist in wheelchair.     Treatments:    There Ex: (all exercises performed with L non-hemiparetic side)  -seated ankle pumps 1'x1 with 3\" hold with PT demo and tactile cues initially for proper technique  -seated LAQ 1'x1 on L side with 3\" hold, PT demo  -seated hip marches 1'x1 on L side with PT demo initially and TC required  -seated isometric ADD with ball x10 with 5\" hold   -seated hip ABD x10 with 5\" hold  -PT " demo for L sided bridge   -provided printed handout for all HEP exercises    OP EDUCATION:  -static standing daily x2, sit<>stands    Every 30 minutes:   -calf raises, LAQ and hip marches 1 min each  -2x/day=hip ADD, ABD and bridges       Goals:  Active       PT Problem       PT Goal 1       Start:  12/31/24    Expected End:  03/31/25       Pt. Will ambulate 50 ft. With LRAD by end of POC to indicate improved functional mobility endurance.         PT Goal 2       Start:  12/31/24    Expected End:  03/31/25       Pt. Will complete low scoot pivot transfers at [facility] by end of POC to indicate improved independence with transfers, reduce caregiver burden.         PT Goal 3       Start:  12/31/24    Expected End:  03/31/25       Pt. Caregiver and spouse will be able to assist pt. With ambulation with LRAD by end of POC to allow pt. To ambulate in his home, maintain lower extremity strength, bone health and reduce physical deconditioning.         PT Goal 4       Start:  12/31/24    Expected End:  03/31/25       Pt. Will stand with LRAD and minimal assist for 5 minutes to assist with ADLS such as toileting.            Resolved       PT Problem       PT Goal 1 (Met)       Start:  10/18/24    Expected End:  01/14/25    Resolved:  12/31/24    Pt. Caregiver will be independent in completing HEP with pt. By end of POC to manage pt. Symptoms, help pt. Maintain ROM and prevent further functional decline.         PT Goal 2 (Met)       Start:  10/18/24    Expected End:  01/14/25    Resolved:  12/31/24    Wheelchair evaluation will be performed within current PT POC in order to improve pt. Positioning in his chair, reduce risk of pressure ulcers.         PT Goal 3 (Not met)       Start:  10/18/24    Expected End:  01/14/25    Resolved:  12/31/24    Pt. Caregiver will be able to independently lock and unlock the patient's KAFO in standing in order to improve R knee stability in standing for transfers, ADLS including bathing.         Goal Note       NOT MET: due to difficulty locking KAFO

## 2025-02-04 ENCOUNTER — TREATMENT (OUTPATIENT)
Dept: PHYSICAL THERAPY | Facility: CLINIC | Age: 70
End: 2025-02-04
Payer: MEDICARE

## 2025-02-04 ENCOUNTER — TREATMENT (OUTPATIENT)
Dept: SPEECH THERAPY | Facility: CLINIC | Age: 70
End: 2025-02-04
Payer: MEDICARE

## 2025-02-04 DIAGNOSIS — R26.89 OTHER ABNORMALITIES OF GAIT AND MOBILITY: Primary | ICD-10-CM

## 2025-02-04 DIAGNOSIS — I69.320 APHASIA FOLLOWING CEREBRAL INFARCTION: ICD-10-CM

## 2025-02-04 DIAGNOSIS — I69.390 APRAXIA FOLLOWING CEREBROVASCULAR ACCIDENT: ICD-10-CM

## 2025-02-04 DIAGNOSIS — I63.9 ISCHEMIC CEREBROVASCULAR ACCIDENT (CVA) (MULTI): ICD-10-CM

## 2025-02-04 DIAGNOSIS — I69.351 HEMIPLEGIA AND HEMIPARESIS FOLLOWING CEREBRAL INFARCTION AFFECTING RIGHT DOMINANT SIDE (MULTI): ICD-10-CM

## 2025-02-04 DIAGNOSIS — G81.11 RIGHT SPASTIC HEMIPLEGIA (MULTI): ICD-10-CM

## 2025-02-04 PROCEDURE — 97530 THERAPEUTIC ACTIVITIES: CPT | Mod: GP | Performed by: PHYSICAL THERAPIST

## 2025-02-04 PROCEDURE — 92507 TX SP LANG VOICE COMM INDIV: CPT | Mod: GN

## 2025-02-04 ASSESSMENT — PAIN - FUNCTIONAL ASSESSMENT: PAIN_FUNCTIONAL_ASSESSMENT: 0-10

## 2025-02-04 ASSESSMENT — PAIN SCALES - GENERAL: PAINLEVEL_OUTOF10: 0 - NO PAIN

## 2025-02-04 NOTE — PROGRESS NOTES
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       SLP Assessment:  SLP TX Intervention Outcome: Making Progress Towards Goals  Treatment Provided: Yes, see Objective for details  Treatment Tolerance: Patient tolerated treatment well      Plan:  SLP TX Plan: Continue Plan of Care  SLP Frequency: 1x/week  SLP Duration: 12 weeks  Discussed POC: Yes, Patient, wife  Discussed Risks/Benefits: Yes, Patient, wife  Patient/Caregiver Agreeable: Yes  Next Session Plan: Address reading and auditory comprehension for single words and phrases;  address writing/typing for single/closed set of words; HEP      Subjective   Patient arrived to treatment via transportation service and attended therapy independently. Patient brought his SGD and tablet to today's visit. Tablet used during the session.      General Visit Information:  Number of Authorized Treatments : Based on MN (Shea)  Total Number of Visits : 29      Pain Assessment:  Pain Assessment: 0-10  Pain Score: 0 - No pain      Objective     Re-Assessment 1/21/2025:    Oral Mechanism Exam / Cranial Nerve Exam:  Trigeminal Nerve (V)     Jaw ROM: WFL  Facial Nerve (VII)     Asymmetry at rest: Right sided facial weakness     Lip pucker: Reduced (R)     Smile: Reduced (R)     Lip/smile alternation: DNT  Glossopharyngeal, Vagus (IX, X)     Uvula at rest: WFL     Palate at rest: WFL     Palatal elevation /a/: CNT (unable to volitionally vocalize)  Hypoglossal (XII)     Tongue at rest: WFL     Tongue protrusion: WFL     Side to side: Reduced (R)     Tongue elevation: WFL      [place] Diagnostic Aphasia Evaluation (BDAE) Short Form:  --Word  Comprehension (auditory): 8/14 (57%) [prev. 10/14 (71%)]  --Word/Picture Match (F/O 4 Words): 8/15 (53%) [prev. 8/15 (53%)]  --Short Sentence Reading Comprehension (F/O 4 Choices): 2/4 (50%) [prev. 2/4 (50%)]     Other:  --Yes/No Questions (yes/no via SGD): 4/9 (44%) [prev. 5/8 (63%)]  --Following Commands (1-Step): 1/4 (25%) [prev. 1/3 (33%)]  --Written Expression (Typing name from memory): 3/5 letters indep., only 1 letter in correct position    *Limited progress evident across all assessment measures. Therapist requested input from patient's wife re: goal setting. Improved use of and independence with SGD identified as a goal. Patient indicated that he doesn't know how to use his device well vs not wanting to use it.       GOALS: Start date: 1/21/2025; End/re-eval date: 4/21/2025  By discharge:  LTG: Patient will comprehend communication and express information for basic wants/needs/ideas using total communication in order to increase participation in the home and community environments.  > GOAL STATUS: Ongoing    STGs:   1. Patient will perform auditory comprehension tasks (e.g. yes/no questions, etc.) using total communication with 90% accuracy given mild cueing.  > NOT ADDRESSED.  > GOAL STATUS: Ongoing      3. Patient will perform reading comprehension tasks at the single word level with 70% accuracy given mild cueing in order to improve navigation/use of SGD.  > ADDRESSED: Phrase level reading addressed via [employer]'s Language Therapy 4-in-1 apps. Patient paired 2-3 word phrases with pictures as follows: F/O 3 phrases: 90% accuracy (9/10) [prev. 45% (10/23)] indep, initial attempt, 100% on second attempt; F/O 4 phrases: 40% accuracy (4/10) [prev. 46% (6/13)] indep, initial attempt, 80% accuracy [prev. 85% (8/13)] on second attempt. Therapist encouraged patient to read all words for each choice given that some answers shared similarities (e.g. brown bear vs brown dog). Patient was often able to narrow  choices down to 2 and benefited from addl cueing to read all words to correctly select target responses.  > GOAL STATUS: Ongoing; progressing    4. Patient will perform written expression tasks at the single word level for closed set of 5-10 patient/caregiver-selected vocabulary with 70% accuracy given mild cueing in order to improve communicative interactions using SGD.  > ADDRESSED: On [employer] mandy, patient performed a fill in the blank spelling task for single words with 1 letter missing per word. Patient selected correct missing letters in 2/5 opportunities on first attempt independently, engaging in several trial/error attempts before selecting the correct letter and indicating the spelling as correct. Using Anagram Copy and Recall Treatment (ACRT), patient was asked to type his name. When unable, patient correctly reordered letters during Anagram stage given addl time. He copied his name x3 using SGD keyboard. He typed his name from memory with moderate cueing.  > GOAL STATUS: Ongoing; progressing      6. Patient will navigate his speech generating device to select appropriate vocabulary items for functional communication in 3 out of 5 opportunities.  > NOT ADDRESSED.  > GOAL STATUS: Ongoing    7. Patient/caregivers will express understanding of and will report adherence to recommended HEP.  > ADDRESSED: Therapist assigned [employer] activities for reading and writing for HEP. Written instructions provided. Patient indicated understanding.  > GOAL STATUS: Ongoing; progressing      Outpatient Education:  Individual(s) Educated: Patient  Verbal Education : Feedback for session performance; HEP instructions re: [employer] reading for phrases, writing for FIB and copying  Risk and Benefits Discussed with Patient/Caregiver/Other: yes  Patient/Caregiver Demonstrated Understanding: yes  Plan of Care Discussed and Agreed Upon: yes  Patient Response to Education: Patient/Caregiver Verbalized Understanding  of Information, Patient/Caregiver Asked Appropriate Questions

## 2025-02-04 NOTE — PROGRESS NOTES
Physical Therapy    Physical Therapy Treatment/Discharge visit    Patient Name: New Pierre  MRN: [id number]  Today's Date: 2/5/2025  Medicare  POC end date: 3/31/25  Visit number: 10  Time Entry:   Time Calculation  Start Time: 1705  Stop Time: 1730  Time Calculation (min): 25 min     PT Therapeutic Procedures Time Entry  Therapeutic Activity Time Entry: 25     Assessment:   Progress check and discharge visit performed today to assess pt. Progress towards his long term goals and determine need for continued physical therapy.  Mr. Pierre continues to require maximal assist for stand pivot transfers, pt. Is limited by severe right hemiparesis in addition to motor apraxia. Since beginning of POC the patient has improved his low squat pivots, which he is completing at minimal assist.  The patient completed high intensity gait training with 3 person assist over the last 5 treatments, however there has not been any carryover with this into his daily activities.  At this time pt. Has reached his maximum potential. Discussed with pt., spouse and caregiver that patient will be discharged due to reaching plateau in progress. Time was spent last visit educating pt. On HEP exercises that I have recommended that he complete hourly, discussed utilizing a timer to ensure compliance.  Pt. Will return in the future for a physical therapy wheelchair evaluation with an [facility] due to pt. And spouse being agreeable that pt. Would benefit from a new chair that fits him better and is lighter weight.       Plan: PT Discharge     Current Problem  1. Other abnormalities of gait and mobility  Follow Up In Physical Therapy      2. Ischemic cerebrovascular accident (CVA) (Multi)  Follow Up In Physical Therapy      3. Hemiplegia and hemiparesis following cerebral infarction affecting right dominant side (Multi)  Follow Up In Physical Therapy      4. Right spastic hemiplegia (Multi)  Follow Up In Physical Therapy                Subjective    Pt.  Shook his head no when asked if he had pain.      Precautions: HTN, global aphasia, apraxia, high fall risk     Vital Signs: not taken     Pain: 0/10       Objective     cognition: global aphasia, apraxia, executive function deficits  Posture: right sided pelvic obliquity, excessive posterior tilt, R hip in external rotation, RUE held into flexion of the elbow and pronation of the wrist, hand in hand flexion  Observation: pt. Arrived to visit in wheelchair requiring dependent assist  Stand pivot transfer: maximal assist with PT blocking R knee  Low scoot pivot from w/c-> mat table=min-A  Low scoot pivot from mat table->w/c=min-A  Seated balance: good without upper extremity support  Standing balance: poor, requires moderate assist and joel-cane to maintain upright, retropulsion noted in standing        Treatments:    There Act;  -stand pivot transfer from w/c to mat table x2 at maximal assist with PT blocking R knee  -low scoot pivot from w/c<>mat table x2 at minimal assist  -scooting F and to the L in w/c at minimal assist  -educated pt./caregiver on HEP recommendation of seated strengthening to be completed every hour    OP EDUCATION:  -static standing daily x2, sit<>stands  -every hour=calf raises, LAQ and hip marches 1 min each  -2x/day=hip ADD, ABD and bridges       Goals:  Active       PT Problem       PT Goal 1       Start:  12/31/24    Expected End:  03/31/25       Pt. Will ambulate 50 ft. With LRAD by end of POC to indicate improved functional mobility endurance.         PT Goal 2       Start:  12/31/24    Expected End:  03/31/25       Pt. Will complete low scoot pivot transfers at [facility] by end of POC to indicate improved independence with transfers, reduce caregiver burden.         PT Goal 3       Start:  12/31/24    Expected End:  03/31/25       Pt. Caregiver and spouse will be able to assist pt. With ambulation with LRAD by end of POC to allow pt. To ambulate in his home, maintain lower extremity strength,  bone health and reduce physical deconditioning.         PT Goal 4       Start:  12/31/24    Expected End:  03/31/25       Pt. Will stand with LRAD and minimal assist for 5 minutes to assist with ADLS such as toileting.            Resolved       PT Problem       PT Goal 1 (Met)       Start:  10/18/24    Expected End:  01/14/25    Resolved:  12/31/24    Pt. Caregiver will be independent in completing HEP with pt. By end of POC to manage pt. Symptoms, help pt. Maintain ROM and prevent further functional decline.         PT Goal 2 (Met)       Start:  10/18/24    Expected End:  01/14/25    Resolved:  12/31/24    Wheelchair evaluation will be performed within current PT POC in order to improve pt. Positioning in his chair, reduce risk of pressure ulcers.         PT Goal 3 (Not met)       Start:  10/18/24    Expected End:  01/14/25    Resolved:  12/31/24    Pt. Caregiver will be able to independently lock and unlock the patient's KAFO in standing in order to improve R knee stability in standing for transfers, ADLS including bathing.        Goal Note       NOT MET: due to difficulty locking KAFO

## 2025-02-06 ENCOUNTER — TELEPHONE (OUTPATIENT)
Dept: NEUROLOGY | Facility: CLINIC | Age: 70
End: 2025-02-06
Payer: MEDICARE

## 2025-02-06 NOTE — TELEPHONE ENCOUNTER
Zabrina (wife) called for Aj's refill of baclofen. He is doing very well on this dose . They have noticed marked benefit in his hands regarding the spasticity.   Please send Baclofen 10mg 1 q HS #90 with refills. To [facility] on file.  He has upcoming visit for Botox injections this spring.

## 2025-02-10 DIAGNOSIS — I69.351 HEMIPLEGIA AND HEMIPARESIS FOLLOWING CEREBRAL INFARCTION AFFECTING RIGHT DOMINANT SIDE (MULTI): ICD-10-CM

## 2025-02-10 RX ORDER — BACLOFEN 10 MG/1
TABLET ORAL
Qty: 90 TABLET | Refills: 1 | Status: SHIPPED | OUTPATIENT
Start: 2025-02-10 | End: 2025-08-09

## 2025-02-11 ENCOUNTER — DOCUMENTATION (OUTPATIENT)
Dept: SPEECH THERAPY | Facility: CLINIC | Age: 70
End: 2025-02-11
Payer: MEDICARE

## 2025-02-11 ENCOUNTER — APPOINTMENT (OUTPATIENT)
Dept: SPEECH THERAPY | Facility: CLINIC | Age: 70
End: 2025-02-11
Payer: MEDICARE

## 2025-02-11 NOTE — PROGRESS NOTES
Speech-Language Pathology                 Therapy Communication Note    Patient Name: New Pierre  MRN: [id number]  Today's Date: 2/11/2025     Discipline: Speech Language Pathology    Missed Visit Reason: Patient's wife cancelled his scheduled Speech Therapy appointment this date 2/2 l/o aide support to get patient to his appointment.    Missed Time: Cancel

## 2025-02-13 ENCOUNTER — PATIENT OUTREACH (OUTPATIENT)
Dept: PRIMARY CARE | Facility: CLINIC | Age: 70
End: 2025-02-13
Payer: MEDICARE

## 2025-02-13 DIAGNOSIS — I63.9 ISCHEMIC CEREBROVASCULAR ACCIDENT (CVA) (MULTI): ICD-10-CM

## 2025-02-13 DIAGNOSIS — I69.351 HEMIPLEGIA AND HEMIPARESIS FOLLOWING CEREBRAL INFARCTION AFFECTING RIGHT DOMINANT SIDE (MULTI): ICD-10-CM

## 2025-02-13 NOTE — PROGRESS NOTES
"Chart Reviewed Prior to Patient Outreach     Spoke with Patient's Wife Zabrina Today.     General:  Pt has been discharged from PT for the time being because the Physical Therapist determined that he has reached his max potential. He is now able to stand for 6 minutes and he continues to do his Home Exercise Program exercises daily to maintain his progress. He is in the process of being measured for a new wheelchair and once that has been delivered, he will work with PT again for transfers. He continues with Speech Therapy and is making progress. Zabrina states that New was able to say \"hello\" to her yesterday. Their primary need at this time is staffing a Home Health Aide regularly.     Neurological:   Pt had a focal seizure on 11/21/24. Pt underwent an EEG on 12/10/24 which found no seizure activity. He is scheduled to see Dr. Vu on 3/17/25.     Medications:  We reviewed New's medications today and Zabrina states that New has been prescribed Levetiracetam in liquid form as noted below. He is also taking Mucinex DM as needed for nasal congestion.     Current Medications:  levETIRAcetam (Keppra) 100 mg/mL solution -Take 5 mL (500 mg) by mouth 2 times a day.     atorvastatin (Lipitor) 40 mg tablet-Take 1 tablet (40 mg) by mouth once daily.     QUEtiapine (SEROquel) 25 mg tablet-Take 1 tablet (25 mg) by mouth once daily at bedtime.     sertraline (Zoloft) 100 mg tablet- Take 1 tablet (100 mg) by mouth once daily.     baclofen (Lioresal) 10 mg tablet- Take 0.5 tablets (5 mg) by mouth once daily at bedtime for 7 days, THEN 1 tablet (10 mg) once daily at bedtime.     triamcinolone (Kenalog) 0.1 % cream-APPLY TOPICALLY 2 TIMES A DAY. APPLY TO AFFECTED AREA 1-2 TIMES DAILY AS NEEDED. Pt is not taking this as it causes skin irritation per pt's wife.     metroNIDAZOLE (Metrogel) 0.75 % gel-Apply topically 2 times a day. Apply twice daily to rosacea     Mucinex DM (Guaifenisin and Dextromethorphan Hydrobromide) " extended release tablets 1200 mg/60 mg tablets - as needed for nasal congestion.     Needs:   Zabrina states that their primary need at this time is to secure regular Home Health Aide services.

## 2025-02-14 ENCOUNTER — OFFICE VISIT (OUTPATIENT)
Dept: PRIMARY CARE | Facility: CLINIC | Age: 70
End: 2025-02-14
Payer: MEDICARE

## 2025-02-14 DIAGNOSIS — Z23 NEED FOR PNEUMOCOCCAL 20-VALENT CONJUGATE VACCINATION: ICD-10-CM

## 2025-02-14 DIAGNOSIS — Z23 FLU VACCINE NEED: ICD-10-CM

## 2025-02-14 PROCEDURE — G0008 ADMIN INFLUENZA VIRUS VAC: HCPCS | Performed by: FAMILY MEDICINE

## 2025-02-14 PROCEDURE — 90662 IIV NO PRSV INCREASED AG IM: CPT | Performed by: FAMILY MEDICINE

## 2025-02-14 PROCEDURE — G0009 ADMIN PNEUMOCOCCAL VACCINE: HCPCS | Performed by: FAMILY MEDICINE

## 2025-02-14 PROCEDURE — 1123F ACP DISCUSS/DSCN MKR DOCD: CPT | Performed by: FAMILY MEDICINE

## 2025-02-14 PROCEDURE — 90677 PCV20 VACCINE IM: CPT | Performed by: FAMILY MEDICINE

## 2025-02-14 NOTE — PROGRESS NOTES
Patient was here today to receive his influenza vaccine and Pneumonia vaccine, patient tolerated well.

## 2025-02-15 LAB
ALBUMIN SERPL-MCNC: 4 G/DL (ref 3.6–5.1)
ALP SERPL-CCNC: 86 U/L (ref 35–144)
ALT SERPL-CCNC: 20 U/L (ref 9–46)
ANION GAP SERPL CALCULATED.4IONS-SCNC: 11 MMOL/L (CALC) (ref 7–17)
AST SERPL-CCNC: 18 U/L (ref 10–35)
BILIRUB SERPL-MCNC: 1 MG/DL (ref 0.2–1.2)
BUN SERPL-MCNC: 16 MG/DL (ref 7–25)
CALCIUM SERPL-MCNC: 9.1 MG/DL (ref 8.6–10.3)
CHLORIDE SERPL-SCNC: 103 MMOL/L (ref 98–110)
CO2 SERPL-SCNC: 25 MMOL/L (ref 20–32)
CREAT SERPL-MCNC: 0.69 MG/DL (ref 0.7–1.35)
EGFRCR SERPLBLD CKD-EPI 2021: 100 ML/MIN/1.73M2
EST. AVERAGE GLUCOSE BLD GHB EST-MCNC: 114 MG/DL
EST. AVERAGE GLUCOSE BLD GHB EST-SCNC: 6.3 MMOL/L
GLUCOSE SERPL-MCNC: 86 MG/DL (ref 65–99)
HBA1C MFR BLD: 5.6 % OF TOTAL HGB
POTASSIUM SERPL-SCNC: 4.2 MMOL/L (ref 3.5–5.3)
PROT SERPL-MCNC: 6.6 G/DL (ref 6.1–8.1)
SODIUM SERPL-SCNC: 139 MMOL/L (ref 135–146)

## 2025-02-18 ENCOUNTER — DOCUMENTATION (OUTPATIENT)
Dept: SPEECH THERAPY | Facility: CLINIC | Age: 70
End: 2025-02-18

## 2025-02-18 ENCOUNTER — APPOINTMENT (OUTPATIENT)
Dept: PHYSICAL THERAPY | Facility: CLINIC | Age: 70
End: 2025-02-18
Payer: MEDICARE

## 2025-02-18 ENCOUNTER — DOCUMENTATION (OUTPATIENT)
Dept: PHYSICAL THERAPY | Facility: CLINIC | Age: 70
End: 2025-02-18

## 2025-02-18 ENCOUNTER — APPOINTMENT (OUTPATIENT)
Dept: SPEECH THERAPY | Facility: CLINIC | Age: 70
End: 2025-02-18
Payer: MEDICARE

## 2025-02-18 NOTE — PROGRESS NOTES
Pt. Spouse called to cancel visit due to pt. Ride not showing up.  I spoke to patient at 4:30 and  W/C evaluation was rescheduled.

## 2025-02-18 NOTE — PROGRESS NOTES
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       SLP Assessment:  SLP TX Intervention Outcome: Making Progress Towards Goals  Treatment Provided: Yes, see Objective for details  Treatment Tolerance: Patient tolerated treatment well      Plan:  SLP TX Plan: Continue Plan of Care  SLP Frequency: 1x/week  SLP Duration: 12 weeks  Discussed POC: Yes, Patient, wife  Discussed Risks/Benefits: Yes, Patient, wife  Patient/Caregiver Agreeable: Yes  Next Session Plan: Address reading and auditory comprehension for single words and phrases;  address writing/typing for single/closed set of words; HEP      Subjective   Patient arrived to treatment via transportation service and attended therapy independently. Patient brought his SGD and tablet to today's visit. Tablet used during the session.      General Visit Information:  Number of Authorized Treatments : Based on MN (Shea)  Total Number of Visits : 29      Pain Assessment:  Pain Assessment: 0-10  Pain Score: 0 - No pain      Objective     Re-Assessment 1/21/2025:    Oral Mechanism Exam / Cranial Nerve Exam:  Trigeminal Nerve (V)     Jaw ROM: WFL  Facial Nerve (VII)     Asymmetry at rest: Right sided facial weakness     Lip pucker: Reduced (R)     Smile: Reduced (R)     Lip/smile alternation: DNT  Glossopharyngeal, Vagus (IX, X)     Uvula at rest: WFL     Palate at rest: WFL     Palatal elevation /a/: CNT (unable to volitionally vocalize)  Hypoglossal (XII)     Tongue at rest: WFL     Tongue protrusion: WFL     Side to side: Reduced (R)     Tongue elevation: WFL      [place] Diagnostic Aphasia Evaluation (BDAE) Short Form:  --Word Comprehension (auditory): 8/14 (57%) [prev. 10/14 (71%)]  --Word/Picture Match (F/O 4 Words): 8/15 (53%) [prev. 8/15 (53%)]  --Short Sentence Reading Comprehension (F/O 4 Choices): 2/4 (50%)  [prev. 2/4 (50%)]     Other:  --Yes/No Questions (yes/no via SGD): 4/9 (44%) [prev. 5/8 (63%)]  --Following Commands (1-Step): 1/4 (25%) [prev. 1/3 (33%)]  --Written Expression (Typing name from memory): 3/5 letters indep., only 1 letter in correct position    *Limited progress evident across all assessment measures. Therapist requested input from patient's wife re: goal setting. Improved use of and independence with SGD identified as a goal. Patient indicated that he doesn't know how to use his device well vs not wanting to use it.       GOALS: Start date: 1/21/2025; End/re-eval date: 4/21/2025  By discharge:  LTG: Patient will comprehend communication and express information for basic wants/needs/ideas using total communication in order to increase participation in the home and community environments.  > GOAL STATUS: Ongoing    STGs:   1. Patient will perform auditory comprehension tasks (e.g. yes/no questions, etc.) using total communication with 90% accuracy given mild cueing.  > NOT ADDRESSED.  > GOAL STATUS: Ongoing      3. Patient will perform reading comprehension tasks at the single word level with 70% accuracy given mild cueing in order to improve navigation/use of SGD.  > ADDRESSED: Phrase level reading addressed via [email or web address]'s Language Therapy 4-in-1 apps. Patient paired 2-3 word phrases with pictures as follows: F/O 3 phrases: 90% accuracy (9/10) [prev. 45% (10/23)] indep, initial attempt, 100% on second attempt; F/O 4 phrases: 40% accuracy (4/10) [prev. 46% (6/13)] indep, initial attempt, 80% accuracy [prev. 85% (8/13)] on second attempt. Therapist encouraged patient to read all words for each choice given that some answers shared similarities (e.g. brown bear vs brown dog). Patient was often able to narrow choices down to 2 and benefited from addl cueing to read all words to correctly select target responses.  > GOAL STATUS: Ongoing; progressing    4. Patient will perform written expression  tasks at the single word level for closed set of 5-10 patient/caregiver-selected vocabulary with 70% accuracy given mild cueing in order to improve communicative interactions using SGD.  > ADDRESSED: On [employer] mandy, patient performed a fill in the blank spelling task for single words with 1 letter missing per word. Patient selected correct missing letters in 2/5 opportunities on first attempt independently, engaging in several trial/error attempts before selecting the correct letter and indicating the spelling as correct. Using Anagram Copy and Recall Treatment (ACRT), patient was asked to type his name. When unable, patient correctly reordered letters during Anagram stage given addl time. He copied his name x3 using SGD keyboard. He typed his name from memory with moderate cueing.  > GOAL STATUS: Ongoing; progressing      6. Patient will navigate his speech generating device to select appropriate vocabulary items for functional communication in 3 out of 5 opportunities.  > NOT ADDRESSED.  > GOAL STATUS: Ongoing    7. Patient/caregivers will express understanding of and will report adherence to recommended HEP.  > ADDRESSED: Therapist assigned [employer] activities for reading and writing for HEP. Written instructions provided. Patient indicated understanding.  > GOAL STATUS: Ongoing; progressing      Outpatient Education:  Individual(s) Educated: Patient  Verbal Education : Feedback for session performance; HEP instructions re: [employer] reading for phrases, writing for FIB and copying  Risk and Benefits Discussed with Patient/Caregiver/Other: yes  Patient/Caregiver Demonstrated Understanding: yes  Plan of Care Discussed and Agreed Upon: yes  Patient Response to Education: Patient/Caregiver Verbalized Understanding of Information, Patient/Caregiver Asked Appropriate Questions

## 2025-02-19 NOTE — PROGRESS NOTES
Speech-Language Pathology                 Therapy Communication Note    Patient Name: New Pierre  MRN: [id number]  Today's Date: 2/18/2025     Discipline: Speech Language Pathology    Missed Visit Reason: Therapist cancelled patient's scheduled Speech Therapy appointment this date 2/2 therapist urgent personal matter.     Missed Time: Cancel

## 2025-02-21 ENCOUNTER — APPOINTMENT (OUTPATIENT)
Dept: PHYSICAL THERAPY | Facility: CLINIC | Age: 70
End: 2025-02-21
Payer: MEDICARE

## 2025-02-25 ENCOUNTER — TREATMENT (OUTPATIENT)
Dept: SPEECH THERAPY | Facility: CLINIC | Age: 70
End: 2025-02-25
Payer: MEDICARE

## 2025-02-25 DIAGNOSIS — I69.320 APHASIA FOLLOWING CEREBRAL INFARCTION: ICD-10-CM

## 2025-02-25 DIAGNOSIS — I69.390 APRAXIA FOLLOWING CEREBROVASCULAR ACCIDENT: ICD-10-CM

## 2025-02-25 PROCEDURE — 92507 TX SP LANG VOICE COMM INDIV: CPT | Mod: GN

## 2025-02-25 ASSESSMENT — PAIN - FUNCTIONAL ASSESSMENT: PAIN_FUNCTIONAL_ASSESSMENT: 0-10

## 2025-02-25 ASSESSMENT — PAIN SCALES - GENERAL: PAINLEVEL_OUTOF10: 0 - NO PAIN

## 2025-02-25 NOTE — PROGRESS NOTES
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       SLP Assessment:  SLP TX Intervention Outcome: Making Progress Towards Goals  Treatment Provided: Yes, see Objective for details  Treatment Tolerance: Patient tolerated treatment well      Plan:  SLP TX Plan: Continue Plan of Care  SLP Frequency: 1x/week  SLP Duration: 12 weeks  Discussed POC: Yes, Patient, wife  Discussed Risks/Benefits: Yes, Patient, wife  Patient/Caregiver Agreeable: Yes  Next Session Plan: Address reading and auditory comprehension for single words and phrases;  address writing/typing for single/closed set of words; HEP      Subjective   Patient arrived to treatment via transportation service and attended therapy independently. Patient brought his SGD and tablet to today's visit. Tablet used during the session. He came with a note from wife that gave the following updates: they have lost an aide, Aj has been working on numbers and spelling in his [facility] mandy.       General Visit Information:  Number of Authorized Treatments : Based on MN (Shea)  Total Number of Visits : 30      Pain Assessment:  Pain Assessment: 0-10  Pain Score: 0 - No pain      Objective     Re-Assessment 1/21/2025:    Oral Mechanism Exam / Cranial Nerve Exam:  Trigeminal Nerve (V)     Jaw ROM: WFL  Facial Nerve (VII)     Asymmetry at rest: Right sided facial weakness     Lip pucker: Reduced (R)     Smile: Reduced (R)     Lip/smile alternation: DNT  Glossopharyngeal, Vagus (IX, X)     Uvula at rest: WFL     Palate at rest: WFL     Palatal elevation /a/: CNT (unable to volitionally vocalize)  Hypoglossal (XII)     Tongue at rest:  WFL     Tongue protrusion: WFL     Side to side: Reduced (R)     Tongue elevation: WFL      Independence Diagnostic Aphasia Evaluation (BDAE) Short Form:  --Word Comprehension (auditory): 8/14 (57%) [prev. 10/14 (71%)]  --Word/Picture Match (F/O 4 Words): 8/15 (53%) [prev. 8/15 (53%)]  --Short Sentence Reading Comprehension (F/O 4 Choices): 2/4 (50%) [prev. 2/4 (50%)]     Other:  --Yes/No Questions (yes/no via SGD): 4/9 (44%) [prev. 5/8 (63%)]  --Following Commands (1-Step): 1/4 (25%) [prev. 1/3 (33%)]  --Written Expression (Typing name from memory): 3/5 letters indep., only 1 letter in correct position    *Limited progress evident across all assessment measures. Therapist requested input from patient's wife re: goal setting. Improved use of and independence with SGD identified as a goal. Patient indicated that he doesn't know how to use his device well vs not wanting to use it.       GOALS: Start date: 1/21/2025; End/re-eval date: 4/21/2025  By discharge:  LTG: Patient will comprehend communication and express information for basic wants/needs/ideas using total communication in order to increase participation in the home and community environments.  > GOAL STATUS: Ongoing    STGs:   1. Patient will perform auditory comprehension tasks (e.g. yes/no questions, etc.) using total communication with 90% accuracy given mild cueing.  > NOT ADDRESSED.  > GOAL STATUS: Ongoing      3. Patient will perform reading comprehension tasks at the single word level with 70% accuracy given mild cueing in order to improve navigation/use of SGD.  > ADDRESSED: Phrase level reading addressed via [employer] 4-in-1 apps. Patient paired 2-3 word phrases with pictures as follows: F/O 3 phrases: 60% accuracy (6/10) [prev. 90%] indep; F/O 4 phrases: 30% accuracy (3/10), 50% on second attempt [prev. 40% (4/10)] indep. Therapist encouraged patient to read all words for each choice given that some answers shared similarities  (e.g. brown bear vs brown dog). Patient was often able to narrow choices down to 2 and benefited from addl cueing to read all words to correctly select target responses. Also targeted via [employer]'s Language Therapy 4-in-1 apps task where the picture that matches the word is shown among similar pictures (ex. All sea animals). Patient achieved 80% accuracy indep, (12/15 trials).   > GOAL STATUS: Ongoing; progressing    4. Patient will perform written expression tasks at the single word level for closed set of 5-10 patient/caregiver-selected vocabulary with 70% accuracy given mild cueing in order to improve communicative interactions using SGD.  > ADDRESSED: On [employer] mandy, patient performed a copy spelling tasks for 3-5 letter words. Patient achieved 75% accuracy (6/8 trials) indep.  > GOAL STATUS: Ongoing; progressing      6. Patient will navigate his speech generating device to select appropriate vocabulary items for functional communication in 3 out of 5 opportunities.  > NOT ADDRESSED.  > GOAL STATUS: Ongoing    7. Patient/caregivers will express understanding of and will report adherence to recommended HEP.  > ADDRESSED: Therapist assigned [employer] activities for reading and writing for HEP. Written instructions provided. Patient indicated understanding.  > GOAL STATUS: Ongoing; progressing      Outpatient Education:  Individual(s) Educated: Patient  Verbal Education : Feedback for session performance; HEP instructions re: [employer] reading for phrases, writing for FIB and copying  Risk and Benefits Discussed with Patient/Caregiver/Other: yes  Patient/Caregiver Demonstrated Understanding: yes  Plan of Care Discussed and Agreed Upon: yes  Patient Response to Education: Patient/Caregiver Verbalized Understanding of Information, Patient/Caregiver Asked Appropriate Questions

## 2025-03-05 ENCOUNTER — CLINICAL SUPPORT (OUTPATIENT)
Dept: PHYSICAL THERAPY | Facility: CLINIC | Age: 70
End: 2025-03-05
Payer: MEDICARE

## 2025-03-05 DIAGNOSIS — I69.351 HEMIPLEGIA AND HEMIPARESIS FOLLOWING CEREBRAL INFARCTION AFFECTING RIGHT DOMINANT SIDE (MULTI): ICD-10-CM

## 2025-03-05 DIAGNOSIS — M62.81 MUSCLE WEAKNESS (GENERALIZED): Primary | ICD-10-CM

## 2025-03-05 DIAGNOSIS — I63.9 ISCHEMIC CEREBROVASCULAR ACCIDENT (CVA) (MULTI): ICD-10-CM

## 2025-03-05 DIAGNOSIS — I63.9 CVA (CEREBRAL VASCULAR ACCIDENT) (MULTI): ICD-10-CM

## 2025-03-05 PROCEDURE — 97161 PT EVAL LOW COMPLEX 20 MIN: CPT | Mod: GP | Performed by: PHYSICAL THERAPIST

## 2025-03-05 PROCEDURE — 97542 WHEELCHAIR MNGMENT TRAINING: CPT | Mod: GP | Performed by: PHYSICAL THERAPIST

## 2025-03-05 ASSESSMENT — PAIN SCALES - GENERAL: PAINLEVEL_OUTOF10: 0 - NO PAIN

## 2025-03-05 ASSESSMENT — PAIN - FUNCTIONAL ASSESSMENT: PAIN_FUNCTIONAL_ASSESSMENT: 0-10

## 2025-03-05 NOTE — PROGRESS NOTES
Physical Therapy    Physical Therapy Evaluation and Treatment      Patient Name: New Pierre  MRN: [id number]  Today's Date: 3/9/2025  Medicare  Time Entry:   Time Calculation  Start Time: 0950  Stop Time: 1050  Time Calculation (min): 60 min  PT Evaluation Time Entry  PT Evaluation (Low) Time Entry: 50  PT Therapeutic Procedures Time Entry  Wheelchair Management Time Entry: 10         Assessment:  Patient is a 69 year old male referred to [facility]'s outpatient physical therapy services with a history of chronic CVA with right hemiplegia. The patient is non-ambulatory and is not safe to use a walker, crutches, cane or POV due to severity of hemiparesis/hemiplegia.  The patient has been wheelchair bound since his stroke on 6/8/23.  The patient requires maximal assist for stand pivot transfers.  The patient presents with posterior pelvic tilt and right sided lateral trunk lean due to right hemiparesis/hemiplegia.  The patient spends 10 hours a day in his current standard wheelchair, which is too small for the patient.  The patient's current seat places him at a risk for pressure ulcers due to it not fitting the patient properly. The patient can self propel in the kitchen, short distances with his non-affected left upper and lower extremities.  The patient has difficulty reaching the wheel in his current standard chair due to decreased range of motion in his left upper extremity and the wheel being placed to far posteriorly.  Mr. Pierre will benefit from an ultra light manual wheelchair with a lateral support on the seat to accommodate for lateral trunk lean in addition to adjustable wheel yelitza in order to improve efficiency with self propulsion.          Plan: discharge after today's visit  OP PT Plan  PT Plan: Skilled PT  PT Frequency: One time visit  Onset Date: 02/04/25  Certification Period Start Date: 03/05/25  Certification Period End Date: 03/05/25  Rehab Potential: Poor    Current Problem:   1.  Muscle weakness (generalized)        2. CVA (cerebral vascular accident) (Multi)  Referral to Physical Therapy      3. Hemiplegia and hemiparesis following cerebral infarction affecting right dominant side (Multi)        4. Ischemic cerebrovascular accident (CVA) (Multi)            Subjective    Pt. Had a stroke on 6/8/23 with residual right sided hemiplegia. He spends the entire day in his current standard wheelchair.  He has home health aides that help him during the day.  He is dependent for all ADLs.  He is incontinent and wears depends.Pt. Is going to get botox in his right arm in July.  Per pt. Spouse, the patient had some active movement in his right hand and gave her a handshake the other day.  He is standing every day with Mona, his caregiver, and he is doing his seated strengthening exercises.   Pt. Spouse present during full evaluation providing information on history and CLOF due to pt. With aphasia.    Living environment: lives with spouse in house with ramp  Equipment: hospital bed, wheelchair, BSC, wheelchair ramp       Precautions: HTN, hear murmer, R hemiparesis      Vital Signs: not taken     Pain: 0/10            Objective     Communication: pt. With global aphasia and apraxia  MMT: this may not be an accurate measure of pt. Strength due to pt. With global aphasia and difficulty understanding directions of manual muscle testing  Muscle Right LE Left LE    Hip Flexion (L1-L2) 0/5 4/5   Hip Abduction 0/5 4+/5   Knee Flexion (L3) 0/5 4/5   Knee Extension (L4) 0/5 4+/5   Ankle Dorsiflexion (L5) 0/5 5/5   Ankle Plantarflexion (S1) 0/5 4/5     PROM RLE:  Knee extension=-10  Ankle DF=-10    RUE PROM:  R elbow extension=-30'     UPPER EXTREMITY STRENGTH: 0/5 in RUE, grossly 4/5 in LUE  UPPER EXTREMITY ROM: R upper extremity with significant spasticity, RUE elbow and wrist in flexion synergy but able to be moved passively into extension. Left shoulder extension limited. Left shoulder flexion to  150'.  SPASTICITY: 2/5 in RUE elbow flexors, 1/5 in knee extensors  OBSERVATION: R UE flexor synergy   POSTURE: severe posterior pelvic tilt, R sided pelvic obliquity with R sided trunk lean, R hip externally rotated.  TRANSFERS:              Low scoot pivot transfer from w/c ->mat table=min-A   Low coot pivot transfer from mat table->w/c=min-A   Stand pivot transfer from custom w/c to mat table=max-A x2 reps   Scooting in w/c laterally=min-A with partial stand  BALANCE:               SITTING: Good without UE support              STANDING: Poor, Requires Mod A x 1 and Enrike-cane to maintain standing for 30 sec. With retropulsion noted   FUNCTIONAL MOBILITY: did not assess due to severity of right sided hemiparesis    Treatments:    W/c mgmt:  -w/c propulsion 15'x1 in straight line, max-A for turns    EDUCATION:  -see treatment/flowsheet       Goals:    No goals established due to pt. Not requiring physical therapy at this time.

## 2025-03-17 ENCOUNTER — APPOINTMENT (OUTPATIENT)
Dept: NEUROLOGY | Facility: CLINIC | Age: 70
End: 2025-03-17
Payer: MEDICARE

## 2025-03-17 VITALS — TEMPERATURE: 97.1 F | DIASTOLIC BLOOD PRESSURE: 69 MMHG | HEART RATE: 61 BPM | SYSTOLIC BLOOD PRESSURE: 125 MMHG

## 2025-03-17 DIAGNOSIS — I69.351 HEMIPLEGIA AND HEMIPARESIS FOLLOWING CEREBRAL INFARCTION AFFECTING RIGHT DOMINANT SIDE (MULTI): ICD-10-CM

## 2025-03-17 DIAGNOSIS — I69.320 APHASIA FOLLOWING CEREBRAL INFARCTION: ICD-10-CM

## 2025-03-17 DIAGNOSIS — G40.209 PARTIAL SYMPTOMATIC EPILEPSY WITH COMPLEX PARTIAL SEIZURES, NOT INTRACTABLE, WITHOUT STATUS EPILEPTICUS (MULTI): Primary | ICD-10-CM

## 2025-03-17 PROCEDURE — 3074F SYST BP LT 130 MM HG: CPT | Performed by: PSYCHIATRY & NEUROLOGY

## 2025-03-17 PROCEDURE — 1036F TOBACCO NON-USER: CPT | Performed by: PSYCHIATRY & NEUROLOGY

## 2025-03-17 PROCEDURE — 1159F MED LIST DOCD IN RCRD: CPT | Performed by: PSYCHIATRY & NEUROLOGY

## 2025-03-17 PROCEDURE — 1123F ACP DISCUSS/DSCN MKR DOCD: CPT | Performed by: PSYCHIATRY & NEUROLOGY

## 2025-03-17 PROCEDURE — 3078F DIAST BP <80 MM HG: CPT | Performed by: PSYCHIATRY & NEUROLOGY

## 2025-03-17 PROCEDURE — 99213 OFFICE O/P EST LOW 20 MIN: CPT | Performed by: PSYCHIATRY & NEUROLOGY

## 2025-03-17 PROCEDURE — 1160F RVW MEDS BY RX/DR IN RCRD: CPT | Performed by: PSYCHIATRY & NEUROLOGY

## 2025-03-17 NOTE — PROGRESS NOTES
NEUROLOGY OUTPATIENT FOLLOW-UP NOTE    Assessment/Plan   Diagnoses and all orders for this visit:  Partial symptomatic epilepsy with complex partial seizures, not intractable, without status epilepticus (Multi)  Hemiplegia and hemiparesis following cerebral infarction affecting right dominant side (Multi)  Aphasia following cerebral infarction      IMPRESSION:  Residual right hemiplegia and aphasia from large LMCA-distribution infarct.  Complex partial seizure    PLAN:  Continue levetiracetam.  I will see him in six months or prn.      Bean Vu Jr., M.D., FAAN   ----------    Subjective     New Pierre is a 69 y.o. year old male here for follow-up.  His wife accompanies him today.    Some intermittent improvement in the right arm and leg power since the last appointment.  No seizures.  He is planned to have a lowered and wider wheelchair so he has better mobility.          No past medical history on file.  No past surgical history on file.  Social History     Tobacco Use    Smoking status: Never    Smokeless tobacco: Never   Substance Use Topics    Alcohol use: Never     family history is not on file.    Current Outpatient Medications:     aspirin 325 mg tablet, Take 1 tablet (325 mg) by mouth once daily., Disp: , Rfl:     atorvastatin (Lipitor) 40 mg tablet, Take 1 tablet (40 mg) by mouth once daily., Disp: 90 tablet, Rfl: 1    baclofen (Lioresal) 10 mg tablet, Take 1 tablet (10 mg) by mouth once daily at bedtime for 90 days, THEN 1 tablet (10 mg) once daily at bedtime., Disp: 90 tablet, Rfl: 1    dextromethorphan-guaifenesin (Mucinex DM)  mg 12 hr tablet, Take 2 tablets by mouth if needed for cough (Congestion). Do not crush, chew, or split., Disp: , Rfl:     levETIRAcetam (Keppra) 100 mg/mL solution, Take 5 mL (500 mg) by mouth 2 times a day., Disp: 900 mL, Rfl: 3    metroNIDAZOLE (Metrogel) 0.75 % gel, Apply topically 2 times a day. Apply twice daily to rosacea, Disp: 45 g, Rfl: 2     QUEtiapine (SEROquel) 25 mg tablet, Take 1 tablet (25 mg) by mouth once daily at bedtime., Disp: 90 tablet, Rfl: 1    sertraline (Zoloft) 100 mg tablet, Take 1 tablet (100 mg) by mouth once daily., Disp: 100 tablet, Rfl: 1    triamcinolone (Kenalog) 0.1 % cream, APPLY TOPICALLY 2 TIMES A DAY. APPLY TO AFFECTED AREA 1-2 TIMES DAILY AS NEEDED., Disp: 30 g, Rfl: 0  Allergies   Allergen Reactions    Chlorhexidine Unknown       Objective     /69   Pulse 61   Temp 36.2 °C (97.1 °F)     CONSTITUTIONAL:  No acute distress     MENTAL STATUS:  Awake, alert.     SPEECH AND LANGUAGE:  Mute.  Can demonstrate function of objects when asked.  Follows most commands, has some difficulty with complex commands.     CRANIAL NERVES:  II-Vision present, has right hemanopsia     III/IV/VI--EOMs are present in all directions.  Pupils are symmetrically reactive in dim light.  Minor right ptosis.     V--Normal facial sensation.     VII--Right facial asymmetry involving lips, right eyelid does not close fully, and slight involvement of forehead.     VIII--Hearing present to voice bilaterally.     IX/X--Symmetric soft palate rise.     XI--Right trapezius is plegic, left has normal power .     XII--Tongue protrudes without deviation.     MOTOR:  Spastic right hemiplegia.  Partial contractures in elbow, wrist, and finger flexion, but I can fully extend the right arm at the elbow and the fingers.  Further, he can now keep his right hand by his leg, rather than by his chest as at the last visit.  No contractures in the right leg.  Normal power, tone, and bulk in the left arm and leg.     SENSORY:  Present pin sensation in both arms and both legs, reduced in the right leg.     COORDINATION:  Normal finger-to-nose and heel-to-shin testing in the left arm and leg, unable in the right limbs because of plegia.     REFLEXES are brisker on the right limbs than the left.  The plantar responses are flexor.     GAIT deferred because of  hemiplegia.      Bean Vu Jr., M.D., [facility]

## 2025-03-26 ENCOUNTER — TREATMENT (OUTPATIENT)
Dept: SPEECH THERAPY | Facility: CLINIC | Age: 70
End: 2025-03-26
Payer: MEDICARE

## 2025-03-26 DIAGNOSIS — I69.320 APHASIA FOLLOWING CEREBRAL INFARCTION: ICD-10-CM

## 2025-03-26 DIAGNOSIS — I69.390 APRAXIA FOLLOWING CEREBROVASCULAR ACCIDENT: ICD-10-CM

## 2025-03-26 PROCEDURE — 92507 TX SP LANG VOICE COMM INDIV: CPT | Mod: GN

## 2025-03-26 ASSESSMENT — PAIN - FUNCTIONAL ASSESSMENT: PAIN_FUNCTIONAL_ASSESSMENT: 0-10

## 2025-03-26 ASSESSMENT — PAIN SCALES - GENERAL: PAINLEVEL_OUTOF10: 0 - NO PAIN

## 2025-03-26 NOTE — PROGRESS NOTES
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       SLP Assessment:  SLP TX Intervention Outcome: Making Progress Towards Goals  Treatment Provided: Yes, see Objective for details  Treatment Tolerance: Patient tolerated treatment well      Plan:  SLP TX Plan: Continue Plan of Care  SLP Frequency: 1x/week  SLP Duration: 12 weeks  Discussed POC: Yes, Patient, wife  Discussed Risks/Benefits: Yes, Patient, wife  Patient/Caregiver Agreeable: Yes  Next Session Plan: Address reading and auditory comprehension for single words and phrases; address writing/typing for single/closed set of words; HEP      Subjective   Patient arrived to treatment via transportation service and attended therapy accompanied by his aide. Patient brought his SGD and tablet to today's visit. Tablet used during the session.       General Visit Information:  Number of Authorized Treatments : Based on MN (Shea)  Total Number of Visits : 31      Pain Assessment:  Pain Assessment: 0-10  Pain Score: 0 - No pain      Objective     Re-Assessment 1/21/2025:    Oral Mechanism Exam / Cranial Nerve Exam:  Trigeminal Nerve (V)     Jaw ROM: WFL  Facial Nerve (VII)     Asymmetry at rest: Right sided facial weakness     Lip pucker: Reduced (R)     Smile: Reduced (R)     Lip/smile alternation: DNT  Glossopharyngeal, Vagus (IX, X)     Uvula at rest: WFL     Palate at rest: WFL     Palatal elevation /a/: CNT (unable to volitionally vocalize)  Hypoglossal (XII)     Tongue at rest: WFL     Tongue protrusion: WFL     Side to side: Reduced (R)     Tongue elevation: WFL      [place] Diagnostic Aphasia Evaluation (BDAE) Short  "Form:  --Word Comprehension (auditory): 8/14 (57%) [prev. 10/14 (71%)]  --Word/Picture Match (F/O 4 Words): 8/15 (53%) [prev. 8/15 (53%)]  --Short Sentence Reading Comprehension (F/O 4 Choices): 2/4 (50%) [prev. 2/4 (50%)]     Other:  --Yes/No Questions (yes/no via SGD): 4/9 (44%) [prev. 5/8 (63%)]  --Following Commands (1-Step): 1/4 (25%) [prev. 1/3 (33%)]  --Written Expression (Typing name from memory): 3/5 letters indep., only 1 letter in correct position    *Limited progress evident across all assessment measures. Therapist requested input from patient's wife re: goal setting. Improved use of and independence with SGD identified as a goal. Patient indicated that he doesn't know how to use his device well vs not wanting to use it.       GOALS: Start date: 1/21/2025; End/re-eval date: 4/21/2025  By discharge:  LTG: Patient will comprehend communication and express information for basic wants/needs/ideas using total communication in order to increase participation in the home and community environments.  > GOAL STATUS: Ongoing    STGs:   1. Patient will perform auditory comprehension tasks (e.g. yes/no questions, etc.) using total communication with 90% accuracy given mild cueing.  > ADDRESSED: Auditory comprehension addressed using [employer]'s Language 4-in-1 and Advanced Language 4-in-1 apps. Patient matched spoken words to written words on level \"hard\" as follows: F/O 3: 3/3 (100%); F/O 4: 6/8 (75%); F/O 6: 7/10 (70%). He matched spoken words to pictures as follows: F/O 2: 2/2 (100%); F/O 3: 5/5 (100%); F/O 6: 10/10 (100%). Her matched spoken sentences to pictures as follows: F/O 3: 6/6 (100%); F/O 4: 5/5 (100%). Patient followed directions as follows: 1-step objects: 3/3 (100%); 1-step adjectives: 1/5 (20%); 2-step objects: 5/10 (50%) with mod written cues. Patient demo'd most notable difficulty with following commands activities as they became more abstract or increased in length/complexity. Therapist " "added to HEP.  > GOAL STATUS: Ongoing; progressing      3. Patient will perform reading comprehension tasks at the single word level with 70% accuracy given mild cueing in order to improve navigation/use of SGD.  > ADDRESSED: Reading comprehension addressed using [employer]'s Language 4-in-1 mandy. Patient matched spoken words to written words on level \"hard\" as follows: F/O 3: 3/3 (100%); F/O 4: 6/8 (75%); F/O 6: 7/10 (70%). He matched written phrases to pictures as follows: F/O 2 phrases: 6/10 (60%). The more similar phrases became, the more difficulty patient had with distinguishing connection between words and picture.  > GOAL STATUS: Ongoing; progressing    4. Patient will perform written expression tasks at the single word level for closed set of 5-10 patient/caregiver-selected vocabulary with 70% accuracy given mild cueing in order to improve communicative interactions using SGD.  > NOT ADDRESSED.  > GOAL STATUS: Ongoing; progressing      6. Patient will navigate his speech generating device to select appropriate vocabulary items for functional communication in 3 out of 5 opportunities.  > ADDRESSED: Briefly addressed today. Therapist asked patient to indicate with his device if he was experiencing any pain today. Patient independently navigated to pain scale and indicated \"no pain.\"  > GOAL STATUS: Ongoing; progressing    7. Patient/caregivers will express understanding of and will report adherence to recommended HEP.  > ADDRESSED: Therapist assigned [employer] activities for HEP. Written instructions provided. Patient/aide indicated understanding.  > GOAL STATUS: Ongoing; progressing      Outpatient Education:  Individual(s) Educated: Patient, aide  Verbal Education : Feedback for session performance; HEP instructions re: [employer]  Risk and Benefits Discussed with Patient/Caregiver/Other: yes  Patient/Caregiver Demonstrated Understanding: yes  Plan of Care Discussed and Agreed Upon: yes  Patient " Response to Education: Patient/Caregiver Verbalized Understanding of Information, Patient/Caregiver Asked Appropriate Questions

## 2025-03-27 ENCOUNTER — APPOINTMENT (OUTPATIENT)
Dept: NEUROLOGY | Facility: CLINIC | Age: 70
End: 2025-03-27
Payer: MEDICARE

## 2025-03-31 ENCOUNTER — PATIENT OUTREACH (OUTPATIENT)
Dept: PRIMARY CARE | Facility: CLINIC | Age: 70
End: 2025-03-31
Payer: MEDICARE

## 2025-03-31 DIAGNOSIS — I69.351 HEMIPLEGIA AND HEMIPARESIS FOLLOWING CEREBRAL INFARCTION AFFECTING RIGHT DOMINANT SIDE (MULTI): ICD-10-CM

## 2025-03-31 DIAGNOSIS — I10 PRIMARY HYPERTENSION: ICD-10-CM

## 2025-03-31 DIAGNOSIS — I63.9 ISCHEMIC CEREBROVASCULAR ACCIDENT (CVA) (MULTI): ICD-10-CM

## 2025-03-31 PROCEDURE — 99490 CHRNC CARE MGMT STAFF 1ST 20: CPT | Performed by: FAMILY MEDICINE

## 2025-03-31 NOTE — PROGRESS NOTES
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     Spoke with pt's wife Zabrina today.     Patient Reported Concerns:  Zabrina states that her main concern at this time is obtaining the Ultra Light Manual Wheelchair for New as he cannot proceed with PT until he gets this. They were informed that his insurance providers have up to 10 weeks to decide whether or not they will cover it and it has currently been 4 weeks. Zabrina was able to secure 5 new Home Health Aides for New and they are helping him to complete his Home Exercise Program and do some of the exercises that SLP has recommended as well. Zabrina feels that he is continuing to make progress. He also itched his Right arm and so she feels that he may now have at least some partial sensation in that arm.     Patient Reports Feeling:  Per Zabrina, pt continues to make progress even though he is not able to attend PT sessions until he receives his new wheelchair.       Medication Reconciliation:   Per Zabrina, there have been no changes to his medication routine and he does not need any medication refills at this time. He has all prescribed medications in the home and she administers his medications as directed.     Medication Inconsistencies?   None noted.     Medication Barriers:  None identified as Zabrina ensures that pt gets all of his medications.     Goals:  Per Zabrina, pt's goal is to be able to ascend the stairs in their home and be able to sleep in his own bed and use the upstairs bathroom.     Potential Barriers to Meeting Goals:   Pt's mobility is limited due to Right-sided " hemiplegia.     Current Work Situation:   Pt is retired.     In the past month have your or any members of your household been unable to pay for necessities (including but not limited to: food, clothing, prescriptions, utilities, medical care):   No, this is not a problem.     Has lack of transportation kept you from any of the following: Work, medical appointments, obtaining necessities:   No, this is not a problem.     How often do you see or talk to people that you care about and feel close to?   Daily.     Self-Management Plan:  Pt is dependent for all ADL's. His wife coordinates all aspects of his care for him.     Are Homecare Services Needed?   No. Pt currently has home health aides.     Medical Equipment Needs:   Pt is awaiting an Ultra Light Manual Wheelchair.     Referrals Needed:   None at this time.     Upcoming Appointments:    4/2/25: Speech Therapy with HATTIE Hope at 2:45 pm   4/9/25: Speech Therapy with HATTIE Hope at 1:45 pm   7/24/25: Neurology Botox with Dr. Vasquez at 11:30 AM   9/4/25: Neurology with Dr. Vu at 10:30 AM.

## 2025-04-02 ENCOUNTER — TREATMENT (OUTPATIENT)
Dept: SPEECH THERAPY | Facility: CLINIC | Age: 70
End: 2025-04-02
Payer: MEDICARE

## 2025-04-02 DIAGNOSIS — I69.390 APRAXIA FOLLOWING CEREBROVASCULAR ACCIDENT: ICD-10-CM

## 2025-04-02 DIAGNOSIS — I69.320 APHASIA FOLLOWING CEREBRAL INFARCTION: ICD-10-CM

## 2025-04-02 PROCEDURE — 92507 TX SP LANG VOICE COMM INDIV: CPT | Mod: GN

## 2025-04-02 ASSESSMENT — PAIN - FUNCTIONAL ASSESSMENT: PAIN_FUNCTIONAL_ASSESSMENT: 0-10

## 2025-04-02 ASSESSMENT — PAIN SCALES - GENERAL: PAINLEVEL_OUTOF10: 0 - NO PAIN

## 2025-04-02 NOTE — PROGRESS NOTES
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       SLP Assessment:  SLP TX Intervention Outcome: Making Progress Towards Goals  Treatment Provided: Yes, see Objective for details  Treatment Tolerance: Patient tolerated treatment well      Plan:  SLP TX Plan: Continue Plan of Care  SLP Frequency: 1x/week  SLP Duration: 12 weeks  Discussed POC: Yes, Patient, aide  Discussed Risks/Benefits: Yes, Patient, aide  Patient/Caregiver Agreeable: Yes  Next Session Plan: Address reading and auditory comprehension for single words and phrases; address writing/typing for single/closed set of words; HEP      Subjective   Patient arrived to treatment via transportation service and attended therapy accompanied by his aide. Patient brought his SGD and tablet to today's visit. Tablet used during the session.       General Visit Information:  Number of Authorized Treatments : Based on MN (Shea)  Total Number of Visits : 31      Pain Assessment:  Pain Assessment: 0-10  Pain Score: 0 - No pain      Objective     Re-Assessment 1/21/2025:    Oral Mechanism Exam / Cranial Nerve Exam:  Trigeminal Nerve (V)     Jaw ROM: WFL  Facial Nerve (VII)     Asymmetry at rest: Right sided facial weakness     Lip pucker: Reduced (R)     Smile: Reduced (R)     Lip/smile alternation: DNT  Glossopharyngeal, Vagus (IX, X)     Uvula at rest: WFL     Palate at rest: WFL     Palatal elevation /a/: CNT (unable to volitionally vocalize)  Hypoglossal (XII)     Tongue at rest: WFL     Tongue protrusion: WFL     Side to side: Reduced (R)     Tongue elevation: WFL      [place] Diagnostic Aphasia Evaluation (BDAE) Short Form:  --Word  "Comprehension (auditory): 8/14 (57%) [prev. 10/14 (71%)]  --Word/Picture Match (F/O 4 Words): 8/15 (53%) [prev. 8/15 (53%)]  --Short Sentence Reading Comprehension (F/O 4 Choices): 2/4 (50%) [prev. 2/4 (50%)]     Other:  --Yes/No Questions (yes/no via SGD): 4/9 (44%) [prev. 5/8 (63%)]  --Following Commands (1-Step): 1/4 (25%) [prev. 1/3 (33%)]  --Written Expression (Typing name from memory): 3/5 letters indep., only 1 letter in correct position    *Limited progress evident across all assessment measures. Therapist requested input from patient's wife re: goal setting. Improved use of and independence with SGD identified as a goal. Patient indicated that he doesn't know how to use his device well vs not wanting to use it.       GOALS: Start date: 1/21/2025; End/re-eval date: 4/21/2025  By discharge:  LTG: Patient will comprehend communication and express information for basic wants/needs/ideas using total communication in order to increase participation in the home and community environments.  > GOAL STATUS: Ongoing    STGs:   1. Patient will perform auditory comprehension tasks (e.g. yes/no questions, etc.) using total communication with 90% accuracy given mild cueing.  > ADDRESSED: Auditory comprehension addressed using [employer] 4-in-1 apps. Patient matched spoken words to pictures on level \"hard\" as follows: F/O: 17/25 (68%) independently. Patient routinely chose a related answer and sometimes benefited from semantic cues. Therapist encouraged he continue HEP.  > GOAL STATUS: Ongoing; progressing      3. Patient will perform reading comprehension tasks at the single word level with 70% accuracy given mild cueing in order to improve navigation/use of SGD.  > NOT ADDRESSED:   > GOAL STATUS: Ongoing; progressing    4. Patient will perform written expression tasks at the single word level for closed set of 5-10 patient/caregiver-selected vocabulary with 70% accuracy given mild cueing in order to " "improve communicative interactions using SGD.  > ADDRESSED: Patient given letter tiles for words he identified as meaningful to participate in Copy and Recall Therapy. Words chosen today were his name, wife's name (Zabrina), dog's name (Krypto), and rachel. Patient able to put letters in order for his own name with mild cueing. Patient asked to copy letters 3 times. He demonstrated difficulty pressing the right letters. He would try to select the correct letter and then persistently hit an adjacent letter accidentally. He became frustrated, and often required clinician assistance to hit the backspace button rather than the 'p' key. After copying 3 times, patient was asked to type in the spelling from memory. He selected the first and last 'd' independently, and then required the model to complete the rest of his name. Due to increasing frustration from accidentally selecting the incorrect letter repeatedly, activity was discontinued and focus was shifted to arranging the tile letters correctly. Zabrina was spelled given moderate assistance from clinician. Patient's first attempt at \"lake\" was \"keal.\" He looked at the word, shaking his head, and then changed to \"erika.\" Patient able to correct it once given the first letter. Patient unable to unscramble Krypto. Patient can often identify when the word look wrong, but cannot independently fix errors.   > GOAL STATUS: Ongoing; progressing      6. Patient will navigate his speech generating device to select appropriate vocabulary items for functional communication in 3 out of 5 opportunities.  > ADDRESSED: Briefly addressed today. Therapist asked patient to indicate with his device personally relevant words to work on spelling. Patient able to navigate to \"New\" page and \"Zabrina\" page.   > GOAL STATUS: Ongoing; progressing    7. Patient/caregivers will express understanding of and will report adherence to recommended HEP.  > ADDRESSED: Therapist assigned continuation of [employer] " Therapy activities for HEP. Patient/aide indicated understanding.  > GOAL STATUS: Ongoing; progressing      Outpatient Education:  Individual(s) Educated: Patient, aide  Verbal Education : Feedback for session performance; HEP instructions re: [employer]actus Therapy  Risk and Benefits Discussed with Patient/Caregiver/Other: yes  Patient/Caregiver Demonstrated Understanding: yes  Plan of Care Discussed and Agreed Upon: yes  Patient Response to Education: Patient/Caregiver Verbalized Understanding of Information, Patient/Caregiver Asked Appropriate Questions

## 2025-04-09 ENCOUNTER — TREATMENT (OUTPATIENT)
Dept: SPEECH THERAPY | Facility: CLINIC | Age: 70
End: 2025-04-09
Payer: MEDICARE

## 2025-04-09 DIAGNOSIS — I69.390 APRAXIA FOLLOWING CEREBROVASCULAR ACCIDENT: ICD-10-CM

## 2025-04-09 DIAGNOSIS — I69.320 APHASIA FOLLOWING CEREBRAL INFARCTION: ICD-10-CM

## 2025-04-09 PROCEDURE — 92507 TX SP LANG VOICE COMM INDIV: CPT | Mod: GN

## 2025-04-09 ASSESSMENT — PAIN - FUNCTIONAL ASSESSMENT: PAIN_FUNCTIONAL_ASSESSMENT: 0-10

## 2025-04-09 ASSESSMENT — PAIN SCALES - GENERAL: PAINLEVEL_OUTOF10: 0 - NO PAIN

## 2025-04-09 NOTE — PROGRESS NOTES
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       SLP Assessment:  SLP TX Intervention Outcome: Making Progress Towards Goals  Treatment Provided: Yes, see Objective for details  Treatment Tolerance: Patient tolerated treatment well      Plan:  SLP TX Plan: Continue Plan of Care  SLP Frequency: 1x/week  SLP Duration: 12 weeks  Discussed POC: Yes, Patient, aide  Discussed Risks/Benefits: Yes, Patient, aide  Patient/Caregiver Agreeable: Yes  Next Session Plan: Address reading and auditory comprehension for single words and phrases; address writing/typing for single/closed set of words; HEP      Subjective   Patient arrived to treatment via transportation service and attended therapy accompanied by his aide. Patient brought his SGD and tablet to today's visit. Tablet used during the session. Aide reports patient is improving on writing.       General Visit Information:  Number of Authorized Treatments : Based on MN (Shea)  Total Number of Visits : 32      Pain Assessment:  Pain Assessment: 0-10  Pain Score: 0 - No pain      Objective     Re-Assessment 1/21/2025:    Oral Mechanism Exam / Cranial Nerve Exam:  Trigeminal Nerve (V)     Jaw ROM: WFL  Facial Nerve (VII)     Asymmetry at rest: Right sided facial weakness     Lip pucker: Reduced (R)     Smile: Reduced (R)     Lip/smile alternation: DNT  Glossopharyngeal, Vagus (IX, X)     Uvula at rest: WFL     Palate at rest: WFL     Palatal elevation /a/: CNT (unable to volitionally vocalize)  Hypoglossal (XII)     Tongue at rest: WFL     Tongue protrusion: WFL     Side to side: Reduced (R)     Tongue elevation: WFL      [place]  Diagnostic Aphasia Evaluation (BDAE) Short Form:  --Word Comprehension (auditory): 8/14 (57%) [prev. 10/14 (71%)]  --Word/Picture Match (F/O 4 Words): 8/15 (53%) [prev. 8/15 (53%)]  --Short Sentence Reading Comprehension (F/O 4 Choices): 2/4 (50%) [prev. 2/4 (50%)]     Other:  --Yes/No Questions (yes/no via SGD): 4/9 (44%) [prev. 5/8 (63%)]  --Following Commands (1-Step): 1/4 (25%) [prev. 1/3 (33%)]  --Written Expression (Typing name from memory): 3/5 letters indep., only 1 letter in correct position    *Limited progress evident across all assessment measures. Therapist requested input from patient's wife re: goal setting. Improved use of and independence with SGD identified as a goal. Patient indicated that he doesn't know how to use his device well vs not wanting to use it.       GOALS: Start date: 1/21/2025; End/re-eval date: 4/21/2025  By discharge:  LTG: Patient will comprehend communication and express information for basic wants/needs/ideas using total communication in order to increase participation in the home and community environments.  > GOAL STATUS: Ongoing    STGs:   1. Patient will perform auditory comprehension tasks (e.g. yes/no questions, etc.) using total communication with 90% accuracy given mild cueing.  > NOT ADDRESSED:   > GOAL STATUS: Ongoing; progressing      3. Patient will perform reading comprehension tasks at the single word level with 70% accuracy given mild cueing in order to improve navigation/use of SGD.  > ADDRESSED: Patient matched phrases to pictures via [employer] Therapy phrase matching F/O 2 90% accuracy independently; F/O 3 80% given min-0 assistance. Patient sometimes expressing doubt by hovering over the answer, which is most often the correct one.   > GOAL STATUS: Ongoing; progressing    4. Patient will perform written expression tasks at the single word level for closed set of 5-10 patient/caregiver-selected vocabulary with 70% accuracy given mild cueing in order to improve  "communicative interactions using SGD.  > ADDRESSED: Patiently independently spelled \"Aj\" 3/3 times today. Patient then given letter tiles for words he identified as meaningful to participate in Copy and Recall Therapy. Words chosen today were wife's name (Zabrina) and lake. Patient able to put letters in order for his own name with mild cueing. Patient asked to copy letters 3 times. Zabrina was spelled with the tiles given moderate assistance from clinician. When copying the letters onto his keyboard, patient persistently stuck at letter \"r\". Patient unable to spell on recall.  Patient unable to put the letters for \"lake\" in the correct order. Patient can often identify when the word look wrong, but can only independently fix errors about 50% of the time.   > GOAL STATUS: Ongoing; progressing      6. Patient will navigate his speech generating device to select appropriate vocabulary items for functional communication in 3 out of 5 opportunities.  > NOT ADDRESSED:   > GOAL STATUS: Ongoing; progressing    7. Patient/caregivers will express understanding of and will report adherence to recommended HEP.  > ADDRESSED: Therapist assigned continuation of Tactus Therapy activities for HEP. Patient/aide indicated understanding.  > GOAL STATUS: Ongoing; progressing      Outpatient Education:  Individual(s) Educated: Patient, aide  Verbal Education : Feedback for session performance; HEP instructions re: Tactus Therapy  Risk and Benefits Discussed with Patient/Caregiver/Other: yes  Patient/Caregiver Demonstrated Understanding: yes  Plan of Care Discussed and Agreed Upon: yes  Patient Response to Education: Patient/Caregiver Verbalized Understanding of Information, Patient/Caregiver Asked Appropriate Questions  "

## 2025-04-16 ENCOUNTER — TREATMENT (OUTPATIENT)
Dept: SPEECH THERAPY | Facility: CLINIC | Age: 70
End: 2025-04-16
Payer: MEDICARE

## 2025-04-16 DIAGNOSIS — I69.390 APRAXIA FOLLOWING CEREBROVASCULAR ACCIDENT: ICD-10-CM

## 2025-04-16 DIAGNOSIS — I69.320 APHASIA FOLLOWING CEREBRAL INFARCTION: ICD-10-CM

## 2025-04-16 PROCEDURE — 92507 TX SP LANG VOICE COMM INDIV: CPT | Mod: GN

## 2025-04-16 ASSESSMENT — PAIN - FUNCTIONAL ASSESSMENT: PAIN_FUNCTIONAL_ASSESSMENT: 0-10

## 2025-04-16 ASSESSMENT — PAIN SCALES - GENERAL: PAINLEVEL_OUTOF10: 0 - NO PAIN

## 2025-04-16 NOTE — PROGRESS NOTES
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       SLP Assessment:  SLP TX Intervention Outcome: Making Progress Towards Goals  Treatment Provided: Yes, see Objective for details  Treatment Tolerance: Patient tolerated treatment well      Plan:  SLP TX Plan: Continue Plan of Care  SLP Frequency: 1x/week  SLP Duration: 12 weeks  Discussed POC: Yes, Patient, aide  Discussed Risks/Benefits: Yes, Patient, aide  Patient/Caregiver Agreeable: Yes  Next Session Plan: Address reading and auditory comprehension for single words and phrases; address writing/typing for single/closed set of words; HEP      Subjective   Patient arrived to treatment via transportation service and attended therapy accompanied by his aide. Patient brought his SGD and tablet to today's visit. Tablet used during the session. Aide reports patient is improving on writing.       General Visit Information:  Number of Authorized Treatments : Based on MN ([place])  Total Number of Visits : 33      Pain Assessment:  Pain Assessment: 0-10  Pain Score: 0 - No pain      Objective     Re-Assessment 1/21/2025:    Oral Mechanism Exam / Cranial Nerve Exam:  Trigeminal Nerve (V)     Jaw ROM: WFL  Facial Nerve (VII)     Asymmetry at rest: Right sided facial weakness     Lip pucker: Reduced (R)     Smile: Reduced (R)     Lip/smile alternation: DNT  Glossopharyngeal, Vagus (IX, X)     Uvula at rest: WFL     Palate at rest: WFL     Palatal elevation /a/: CNT (unable to volitionally vocalize)  Hypoglossal (XII)     Tongue at rest: WFL     Tongue protrusion: WFL     Side to side: Reduced (R)     Tongue elevation: WFL      [place] Diagnostic  Aphasia Evaluation (BDAE) Short Form:  --Word Comprehension (auditory): 8/14 (57%) [prev. 10/14 (71%)]  --Word/Picture Match (F/O 4 Words): 8/15 (53%) [prev. 8/15 (53%)]  --Short Sentence Reading Comprehension (F/O 4 Choices): 2/4 (50%) [prev. 2/4 (50%)]     Other:  --Yes/No Questions (yes/no via SGD): 4/9 (44%) [prev. 5/8 (63%)]  --Following Commands (1-Step): 1/4 (25%) [prev. 1/3 (33%)]  --Written Expression (Typing name from memory): 3/5 letters indep., only 1 letter in correct position    *Limited progress evident across all assessment measures. Therapist requested input from patient's wife re: goal setting. Improved use of and independence with SGD identified as a goal. Patient indicated that he doesn't know how to use his device well vs not wanting to use it.       GOALS: Start date: 1/21/2025; End/re-eval date: 4/21/2025  By discharge:  LTG: Patient will comprehend communication and express information for basic wants/needs/ideas using total communication in order to increase participation in the home and community environments.  > GOAL STATUS: Ongoing    STGs:   1. Patient will perform auditory comprehension tasks (e.g. yes/no questions, etc.) using total communication with 90% accuracy given mild cueing.  > NOT ADDRESSED:   > GOAL STATUS: Ongoing; progressing      3. Patient will perform reading comprehension tasks at the single word level with 70% accuracy given mild cueing in order to improve navigation/use of SGD.  > ADDRESSED: Patient matched phrases to pictures via [employer] Therapy phrase matching in a F/O 3 with the the following results: 1st trial 60%, 2nd trial 70% given min-0 assistance (prev. 80%). Patient then completed a phrase completion activity on the [employer] Therapy Gurpreet (ex. Oven _____ -> mitt). Patient achieved 76% accuracy in a F/O 2 indep., 40% accuracy in a F/O 3 with some trials also read aloud to him. Activity tried again in a F/O 2 with 70% accuracy indep.  > GOAL STATUS: Ongoing;  progressing    4. Patient will perform written expression tasks at the single word level for closed set of 5-10 patient/caregiver-selected vocabulary with 70% accuracy given mild cueing in order to improve communicative interactions using SGD.  > ADDRESSED: Patient completed Fill in the Blank level Easy for 3-5 letter words via [employer] Therapy mandy. Patient benefited from trying each letter choice to see which looked the best. With this strategy, patient achieved 90% accuracy independently.    > GOAL STATUS: Ongoing; progressing      6. Patient will navigate his speech generating device to select appropriate vocabulary items for functional communication in 3 out of 5 opportunities.  > NOT ADDRESSED:   > GOAL STATUS: Ongoing; progressing    7. Patient/caregivers will express understanding of and will report adherence to recommended HEP.  > ADDRESSED: Therapist assigned continuation of Tactus Therapy activities for HEP. Patient/aide indicated understanding.  > GOAL STATUS: Ongoing; progressing      Outpatient Education:  Individual(s) Educated: Patient, aide  Verbal Education : Feedback for session performance; HEP instructions re: [employer]us Therapy  Risk and Benefits Discussed with Patient/Caregiver/Other: yes  Patient/Caregiver Demonstrated Understanding: yes  Plan of Care Discussed and Agreed Upon: yes  Patient Response to Education: Patient/Caregiver Verbalized Understanding of Information, Patient/Caregiver Asked Appropriate Questions

## 2025-04-23 ENCOUNTER — DOCUMENTATION (OUTPATIENT)
Dept: SPEECH THERAPY | Facility: CLINIC | Age: 70
End: 2025-04-23
Payer: MEDICARE

## 2025-04-23 NOTE — PROGRESS NOTES
Speech-Language Pathology                 Therapy Communication Note    Patient Name: New Pierre  MRN: [id number]  Today's Date: 4/23/2025     Discipline: Speech Language Pathology    Missed Visit Reason: Patient was a no show/no call to his scheduled Speech Therapy appointment this date.    Missed Time: No Show

## 2025-04-29 ENCOUNTER — PATIENT OUTREACH (OUTPATIENT)
Dept: PRIMARY CARE | Facility: CLINIC | Age: 70
End: 2025-04-29
Payer: MEDICARE

## 2025-04-29 DIAGNOSIS — I63.9 ISCHEMIC CEREBROVASCULAR ACCIDENT (CVA) (MULTI): ICD-10-CM

## 2025-04-29 DIAGNOSIS — I69.351 HEMIPLEGIA AND HEMIPARESIS FOLLOWING CEREBRAL INFARCTION AFFECTING RIGHT DOMINANT SIDE (MULTI): ICD-10-CM

## 2025-04-29 PROCEDURE — 99490 CHRNC CARE MGMT STAFF 1ST 20: CPT | Performed by: FAMILY MEDICINE

## 2025-04-29 NOTE — PROGRESS NOTES
Care Management Monthly Outreach  Chart review completed-Yes   Confirmation of at least 2 patient identifiers-Yes, spoke with pt's wife Zabrina  Change in insurance? No    Has patient been to ER/Urgent Care since last outreach? No    Last Office Visit with PCP: 2/14/2025   Next Office Visit with PCP: Visit date not found   APC Collaboration: n/a    Chronic Conditions and Outreach Summary:   Hemiplegia and hemiparesis following cerebral infarction affecting right dominant side (Multi)    Ischemic cerebrovascular accident (CVA) (Multi)    Pt is now going to the [facility] Day program in [place] 2 days per week for a half day and Zabrina states that New is happy with this program. She states that he also continues to make progress with spelling and writing with his Speech Generating Device. He continues to go to Speech/Language Therapy once per week and his SLP notes that he is making progress towards goals in her notes.  After some difficulty, she has been able to secure enough help from Home Health Aides through a combination of the aide services supplied through medicaid and paying out of pocket for additional help several days per week. Zabrina also reports that New is now able to wiggle his toes on his Right side which was not possible before. He is not currently attending outpatient PT, but Zabrina states that he continues to do his HEP with his home health aides and has maintained the progress that he made with outpatient PT. They were waiting on the arrival of a custom-built wheelchair to be able to begin PT again, but the chair was not measured correctly and will not fit through the door that goes into their garage or the door into New's bedroom and so they are not able to use it currently. She has spoken with the  about this and is waiting to see what reparations (if any) they are willing to make.     Medications:   Are there medication changes since last visit? No  Refills  needed? No    Social Drivers of Health: Complete  Care Gaps Addressed? Complete  Care Plan addressed: Yes    Upcoming Appointments:   Future Appointments       Date / Time Provider Department Dept Phone    5/7/2025 1:45 PM Aracely Grimaldo, SLP [facility] [phone]    5/14/2025 1:45 PM HATTIE Anglin [facility] [phone]    7/24/2025 11:30 AM Ced Vasquez MD [facility] [phone]    9/4/2025 10:30 AM Bean Vu MD [facility] [phone]          Blood Pressures Reviewed  BP Readings from Last 3 Encounters:   03/17/25 125/69   11/27/24 117/65   10/29/24 120/80     Labs Reviewed:  Lab Results   Component Value Date    CREATININE 0.69 (L) 02/14/2025    GLUCOSE 86 02/14/2025    ALKPHOS 86 02/14/2025    K 4.2 02/14/2025    PROT 6.6 02/14/2025     02/14/2025    CALCIUM 9.1 02/14/2025    AST 18 02/14/2025    ALT 20 02/14/2025    BUN 16 02/14/2025    GFRMALE 90 04/18/2022     Lab Results   Component Value Date    TRIG 148 11/13/2023    CHOL 128 11/13/2023    LDLCALC 63 11/13/2023    HDL 35.9 11/13/2023     Lab Results   Component Value Date    HGBA1C 5.6 02/14/2025    HGBA1C 5.1 04/23/2024    HGBA1C 5.7 (H) 07/23/2023     Lab Results   Component Value Date    WBC 7.3 11/13/2023    RBC 5.00 11/13/2023    HGB 14.7 11/13/2023     11/13/2023   No other concerns at this time.  Agreeable to continue monthly outreaches.  Encouraged to call if questions or concerns arise.    Bia Bo RN

## 2025-05-07 ENCOUNTER — APPOINTMENT (OUTPATIENT)
Dept: SPEECH THERAPY | Facility: CLINIC | Age: 70
End: 2025-05-07
Payer: MEDICARE

## 2025-05-07 ENCOUNTER — DOCUMENTATION (OUTPATIENT)
Dept: SPEECH THERAPY | Facility: CLINIC | Age: 70
End: 2025-05-07
Payer: MEDICARE

## 2025-05-07 NOTE — PROGRESS NOTES
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       SLP Assessment:  SLP TX Intervention Outcome: Making Progress Towards Goals  Treatment Provided: Yes, see Objective for details  Treatment Tolerance: Patient tolerated treatment well      Plan:  SLP TX Plan: Continue Plan of Care  SLP Frequency: 1x/week  SLP Duration: 12 weeks  Discussed POC: Yes, Patient, aide  Discussed Risks/Benefits: Yes, Patient, aide  Patient/Caregiver Agreeable: Yes  Next Session Plan: Address reading and auditory comprehension for single words and phrases; address writing/typing for single/closed set of words; HEP      Subjective   Patient arrived to treatment via transportation service and attended therapy accompanied by his aide. Patient brought his SGD and tablet to today's visit. Tablet used during the session. Aide reports patient is improving on writing.       General Visit Information:  Number of Authorized Treatments : Based on MN (Shea)  Total Number of Visits : 33      Pain Assessment:  Pain Assessment: 0-10  Pain Score: 0 - No pain      Objective     Re-Assessment 1/21/2025:    Oral Mechanism Exam / Cranial Nerve Exam:  Trigeminal Nerve (V)     Jaw ROM: WFL  Facial Nerve (VII)     Asymmetry at rest: Right sided facial weakness     Lip pucker: Reduced (R)     Smile: Reduced (R)     Lip/smile alternation: DNT  Glossopharyngeal, Vagus (IX, X)     Uvula at rest: WFL     Palate at rest: WFL     Palatal elevation /a/: CNT (unable to volitionally vocalize)  Hypoglossal (XII)     Tongue at rest: WFL     Tongue protrusion: WFL     Side to side: Reduced (R)     Tongue elevation: WFL      [place] Diagnostic Aphasia Evaluation (BDAE) Short Form:  --Word Comprehension (auditory): 8/14 (57%) [prev. 10/14 (71%)]  --Word/Picture Match (F/O 4 Words): 8/15 (53%) [prev. 8/15 (53%)]  --Short  Sentence Reading Comprehension (F/O 4 Choices): 2/4 (50%) [prev. 2/4 (50%)]     Other:  --Yes/No Questions (yes/no via SGD): 4/9 (44%) [prev. 5/8 (63%)]  --Following Commands (1-Step): 1/4 (25%) [prev. 1/3 (33%)]  --Written Expression (Typing name from memory): 3/5 letters indep., only 1 letter in correct position    *Limited progress evident across all assessment measures. Therapist requested input from patient's wife re: goal setting. Improved use of and independence with SGD identified as a goal. Patient indicated that he doesn't know how to use his device well vs not wanting to use it.       GOALS: Start date: 1/21/2025; End/re-eval date: 4/21/2025  By discharge:  LTG: Patient will comprehend communication and express information for basic wants/needs/ideas using total communication in order to increase participation in the home and community environments.  > GOAL STATUS: Ongoing    STGs:   1. Patient will perform auditory comprehension tasks (e.g. yes/no questions, etc.) using total communication with 90% accuracy given mild cueing.  > NOT ADDRESSED:   > GOAL STATUS: Ongoing; progressing      3. Patient will perform reading comprehension tasks at the single word level with 70% accuracy given mild cueing in order to improve navigation/use of SGD.  > ADDRESSED: Patient matched phrases to pictures via [employer] phrase matching in a F/O 3 with the the following results: 1st trial 60%, 2nd trial 70% given min-0 assistance (prev. 80%). Patient then completed a phrase completion activity on the [employer] Gurpreet (ex. Oven _____ -> mitt). Patient achieved 76% accuracy in a F/O 2 indep., 40% accuracy in a F/O 3 with some trials also read aloud to him. Activity tried again in a F/O 2 with 70% accuracy indep.  > GOAL STATUS: Ongoing; progressing    4. Patient will perform written expression tasks at the single word level for closed set of 5-10 patient/caregiver-selected vocabulary with 70% accuracy given mild cueing  in order to improve communicative interactions using SGD.  > ADDRESSED: Patient completed Fill in the Blank level Easy for 3-5 letter words via [employer] mandy. Patient benefited from trying each letter choice to see which looked the best. With this strategy, patient achieved 90% accuracy independently.    > GOAL STATUS: Ongoing; progressing      6. Patient will navigate his speech generating device to select appropriate vocabulary items for functional communication in 3 out of 5 opportunities.  > NOT ADDRESSED:   > GOAL STATUS: Ongoing; progressing    7. Patient/caregivers will express understanding of and will report adherence to recommended HEP.  > ADDRESSED: Therapist assigned continuation of [employer] activities for HEP. Patient/aide indicated understanding.  > GOAL STATUS: Ongoing; progressing      Outpatient Education:  Individual(s) Educated: Patient, aide  Verbal Education : Feedback for session performance; HEP instructions re: [employer]  Risk and Benefits Discussed with Patient/Caregiver/Other: yes  Patient/Caregiver Demonstrated Understanding: yes  Plan of Care Discussed and Agreed Upon: yes  Patient Response to Education: Patient/Caregiver Verbalized Understanding of Information, Patient/Caregiver Asked Appropriate Questions

## 2025-05-07 NOTE — PROGRESS NOTES
Speech-Language Pathology                 Therapy Communication Note    Patient Name: New Pierre  MRN: [id number]  Today's Date: 5/7/2025     Discipline: Speech Language Pathology    Missed Visit Reason: Patient cancelled his scheduled Speech Therapy appointment this date.    Missed Time: Cancel

## 2025-05-10 DIAGNOSIS — F41.8 DEPRESSION WITH ANXIETY: ICD-10-CM

## 2025-05-10 DIAGNOSIS — E78.2 MIXED HYPERLIPIDEMIA: ICD-10-CM

## 2025-05-14 ENCOUNTER — TREATMENT (OUTPATIENT)
Dept: SPEECH THERAPY | Facility: CLINIC | Age: 70
End: 2025-05-14
Payer: MEDICARE

## 2025-05-14 DIAGNOSIS — I69.390 APRAXIA FOLLOWING CEREBROVASCULAR ACCIDENT: ICD-10-CM

## 2025-05-14 DIAGNOSIS — I69.320 APHASIA FOLLOWING CEREBRAL INFARCTION: ICD-10-CM

## 2025-05-14 PROCEDURE — 92507 TX SP LANG VOICE COMM INDIV: CPT | Mod: GN

## 2025-05-14 ASSESSMENT — PAIN - FUNCTIONAL ASSESSMENT: PAIN_FUNCTIONAL_ASSESSMENT: 0-10

## 2025-05-14 ASSESSMENT — PAIN SCALES - GENERAL: PAINLEVEL_OUTOF10: 0 - NO PAIN

## 2025-05-14 NOTE — PROGRESS NOTES
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       SLP Assessment:  SLP TX Intervention Outcome: Making Progress Towards Goals  Treatment Provided: Yes, see Objective for details  Treatment Tolerance: Patient tolerated treatment well      Plan:  SLP TX Plan: Continue Plan of Care  SLP Frequency: 1x/week  SLP Duration: 12 weeks  Discussed POC: Yes, Patient, aide  Discussed Risks/Benefits: Yes, Patient, aide  Patient/Caregiver Agreeable: Yes  Next Session Plan: Address reading and auditory comprehension for single words and phrases; address writing/typing for single/closed set of words; HEP  HEP: [place] [employer] Language 4-in-1 mandy (auditory comprehension -> identify [lv 1,2,3,5], follow [lv 1,3)      Subjective   Patient arrived to treatment via transportation service and attended therapy accompanied by his aide. Patient with obvious s/s of hay fever today, endorsing that he has been struggling with allergies with recent warm up of weather. Patient brought his SGD and tablet to today's visit. Tablet used during the session.      General Visit Information:  Number of Authorized Treatments : Based on MN (Shea)  Total Number of Visits : 34      Pain Assessment:  Pain Assessment: 0-10  Pain Score: 0 - No pain      Objective     Re-Assessment 1/21/2025:    Oral Mechanism Exam / Cranial Nerve Exam:  Trigeminal Nerve (V)     Jaw ROM: WFL  Facial Nerve (VII)     Asymmetry at rest: Right sided facial weakness     Lip pucker: Reduced (R)     Smile: Reduced (R)     Lip/smile alternation: DNT  Glossopharyngeal, Vagus (IX, X)     Uvula at rest: WFL     Palate at rest: WFL     Palatal  elevation /a/: CNT (unable to volitionally vocalize)  Hypoglossal (XII)     Tongue at rest: WFL     Tongue protrusion: WFL     Side to side: Reduced (R)     Tongue elevation: WFL      [place] Diagnostic Aphasia Evaluation (BDAE) Short Form:  --Word Comprehension (auditory): 8/14 (57%) [prev. 10/14 (71%)]  --Word/Picture Match (F/O 4 Words): 8/15 (53%) [prev. 8/15 (53%)]  --Short Sentence Reading Comprehension (F/O 4 Choices): 2/4 (50%) [prev. 2/4 (50%)]     Other:  --Yes/No Questions (yes/no via SGD): 4/9 (44%) [prev. 5/8 (63%)]  --Following Commands (1-Step): 1/4 (25%) [prev. 1/3 (33%)]  --Written Expression (Typing name from memory): 3/5 letters indep., only 1 letter in correct position    *Limited progress evident across all assessment measures. Therapist requested input from patient's wife re: goal setting. Improved use of and independence with SGD identified as a goal. Patient indicated that he doesn't know how to use his device well vs not wanting to use it.       GOALS: Start date: 5/14/2025; anticipated end/re-eval date: 8/12/2025  By discharge:  LTG: Patient will comprehend communication and express information for basic wants/needs/ideas using total communication in order to increase participation in the home and community environments.  > GOAL STATUS: Ongoing; progressing    STGs:   1. Patient will perform auditory comprehension tasks (e.g. yes/no questions, etc.) using total communication with 90% accuracy given mild cueing.  > ADDRESSED: Following auditory directions task performed via [employer] Language 4-in-1 mandy. Patient performed levels  > GOAL STATUS: Ongoing; progressing      3. Patient will perform reading comprehension tasks at the single word level with 70% accuracy given mild cueing in order to improve navigation/use of SGD.  > ADDRESSED: Reading addressed via [employer] Advanced Language 4-in-1 mandy. He matched sentences and pictures (F/O 4 pictures) with the following accuracies:  levels 1-4: 60%; levels 3-6: 50%; levels 1-2: 90%; level 3: 90%; level 5: 60%. Directness of the sentence structure for levels 1-3 were easier for patient to understand resulting in improved accuracy. When complexity of sentence structure increased (levels 4+), accuracy declined. Instructions for home practice provided. Phrase and sentence reading for fill in the blank task was also addressed, however, accuracy was <=50% and continuation was not recommended d/t poor reliability of accuracy. Revisit at a subsequent session.  > GOAL STATUS: Ongoing; progressing    4. Patient will perform written expression tasks at the single word level for closed set of 5-10 patient/caregiver-selected vocabulary with 70% accuracy given mild cueing in order to improve communicative interactions using SGD.  > NOT ADDRESSED.  > GOAL STATUS: Ongoing; progressing      6. Patient will navigate his speech generating device to select appropriate vocabulary items for functional communication in 3 out of 5 opportunities.  > NOT ADDRESSED.  > GOAL STATUS: Ongoing; progressing    7. Patient/caregivers will express understanding of and will report adherence to recommended HEP.  > ADDRESSED: Therapist assigned continuation of [employer] activities for HEP. Handout listing specific tasks and levels provided. Patient/aide indicated understanding.  > GOAL STATUS: Ongoing; progressing      Outpatient Education:  Individual(s) Educated: Patient, aide  Written Education: HEP instructions re: [employer]  Verbal Education : Feedback for session performance; HEP instructions re: [employer]  Risk and Benefits Discussed with Patient/Caregiver/Other: yes  Patient/Caregiver Demonstrated Understanding: yes  Plan of Care Discussed and Agreed Upon: yes  Patient Response to Education: Patient/Caregiver Verbalized Understanding of Information, Patient/Caregiver Asked Appropriate Questions

## 2025-05-16 DIAGNOSIS — G47.09 OTHER INSOMNIA: ICD-10-CM

## 2025-05-16 DIAGNOSIS — E78.2 MIXED HYPERLIPIDEMIA: ICD-10-CM

## 2025-05-20 RX ORDER — ATORVASTATIN CALCIUM 40 MG/1
40 TABLET, FILM COATED ORAL DAILY
Qty: 30 TABLET | Refills: 0 | Status: SHIPPED | OUTPATIENT
Start: 2025-05-20 | End: 2025-05-20 | Stop reason: SDUPTHER

## 2025-05-20 RX ORDER — QUETIAPINE FUMARATE 25 MG/1
25 TABLET, FILM COATED ORAL NIGHTLY
Qty: 30 TABLET | Refills: 0 | Status: SHIPPED | OUTPATIENT
Start: 2025-05-20

## 2025-05-20 RX ORDER — ATORVASTATIN CALCIUM 40 MG/1
40 TABLET, FILM COATED ORAL DAILY
Qty: 30 TABLET | Refills: 0 | Status: SHIPPED | OUTPATIENT
Start: 2025-05-20

## 2025-05-20 RX ORDER — SERTRALINE HYDROCHLORIDE 100 MG/1
100 TABLET, FILM COATED ORAL DAILY
Qty: 90 TABLET | Refills: 0 | Status: SHIPPED | OUTPATIENT
Start: 2025-05-20

## 2025-05-20 NOTE — TELEPHONE ENCOUNTER
Follow-up visit: none - due Feb  Last visit: 10/29/24 rtc 3-4m  Last labs:   -2/14/25 (AST 18, ALT 20)  -4/23/24 (, LDL 61, HDL 39, TG 85) - recheck 6m after 10/29/24 appt. DUE  Depression screening: 10/29/24 (PHQ9 score 2)    Refill request from [facility] pharmacy for the following:   - atorvastatin 40mg daily  - sertraline 100mg daily  Approved: 90 days for sertraline and 30 days for atorvastatin since pt is due for lipid recheck  Pt needs to schedule his 3-4 month follow-up for further refills

## 2025-05-28 ENCOUNTER — PATIENT OUTREACH (OUTPATIENT)
Dept: PRIMARY CARE | Facility: CLINIC | Age: 70
End: 2025-05-28
Payer: MEDICARE

## 2025-05-28 DIAGNOSIS — I69.351 HEMIPLEGIA AND HEMIPARESIS FOLLOWING CEREBRAL INFARCTION AFFECTING RIGHT DOMINANT SIDE (MULTI): ICD-10-CM

## 2025-05-28 DIAGNOSIS — I63.9 ISCHEMIC CEREBROVASCULAR ACCIDENT (CVA) (MULTI): ICD-10-CM

## 2025-05-28 ASSESSMENT — ACTIVITIES OF DAILY LIVING (ADL): BATHING: NO

## 2025-05-28 NOTE — PROGRESS NOTES
Care Management Monthly Outreach  Chart review completed-Yes   Confirmation of at least 2 patient identifiers-Yes   Change in insurance? No    Has patient been to ER/Urgent Care since last outreach? No    With pt's permission, I spoke with his wife Zabrina today.     Last Office Visit with PCP: 2/14/2025   Next Office Visit with PCP: 8/8/2025   APC Collaboration: n/a    Chronic Conditions and Outreach Summary:   Hemiplegia and hemiparesis following cerebral infarction affecting right dominant side (Multi)    Ischemic cerebrovascular accident (CVA) (Multi)    Per the most recent Speech Therapy note, pt is tolerating SLP well and continues to make progress towards his goals. Zabrina states that New uses his Speech Generating Device daily at home and also has the [employer] mandy on his own, personal tablet that he uses to improve speech, reading comprehension, syntax and sequencing. She reports that his success rate is generally 90% or more. She also reports that they were able to have the custom wheelchair that PT ordered for him repaired so that it can fit through their doorways and he will be able to re-start outpatient PT. He has continued his HEP at home with his home health aides and he his now able to stand at the sink in their laundry room and he has been practicing this successfully 4x per day over the last month. He continues to go to the [facility] program in [place] 2 days per week. We discussed that he is overdue for a follow up appointment and so I assisted Zabrina in scheduling an appointment for 8/8/25. She reports that New does develop excessive cerumen in both of his ears because he cannot clean them himself and she avoids doing this for him because she is fearful of injuring his ears. She would like for his ears to be examined and debrided if necessary at his next appointment. I put this in the visit note when I scheduled the appointment.     Medications:   Are there medication changes since last visit?  No  Refills needed? No    Social Drivers of Health: Addressed today  Care Gaps Addressed? Deferred  Care Plan addressed: Yes    Upcoming Appointments:   Future Appointments       Date / Time Provider Department Dept Phone    6/11/2025 1:45 PM Aracely Grimaldo, SLP [facility] [phone]    6/18/2025 1:45 PM Aracely Grimaldo, SLP [facility] [phone]    6/25/2025 1:00 PM Aracely Grimaldo, SLP [facility] [phone]    7/2/2025 1:45 PM Aracely Grimaldo, SLP [facility] [phone]    7/9/2025 1:45 PM Aracely Grimaldo, SLP [facility] [phone]    7/16/2025 1:45 PM Aracely Grimaldo, SLP [facility] [phone]    7/23/2025 1:45 PM Aracely Grimaldo, SLP [facility] [phone]    7/24/2025 11:30 AM Ced Vasquez MD [facility] [phone]    8/8/2025 11:00 AM (Arrive by 10:45 AM) Gillian Foreman DO [facility] [phone]    9/4/2025 10:30 AM Bean Vu MD [facility] [phone]          Blood Pressures Reviewed  BP Readings from Last 3 Encounters:   03/17/25 125/69   11/27/24 117/65   10/29/24 120/80     Labs Reviewed:  Lab Results   Component Value Date    CREATININE 0.69 (L) 02/14/2025    GLUCOSE 86 02/14/2025    ALKPHOS 86 02/14/2025    K 4.2 02/14/2025    PROT 6.6 02/14/2025     02/14/2025    CALCIUM 9.1 02/14/2025    AST 18 02/14/2025    ALT 20 02/14/2025    BUN 16 02/14/2025    GFRMALE 90 04/18/2022     Lab Results   Component Value Date    TRIG 148 11/13/2023    CHOL 128 11/13/2023    LDLCALC 63 11/13/2023    HDL 35.9 11/13/2023     Lab Results   Component Value Date    HGBA1C 5.6 02/14/2025    HGBA1C 5.1 04/23/2024    HGBA1C 5.7 (H) 07/23/2023     Lab Results   Component Value Date    WBC 7.3 11/13/2023    RBC 5.00 11/13/2023    HGB 14.7 11/13/2023     11/13/2023   No other concerns at this time.  Agreeable to continue monthly  outreaches.  Encouraged to call if questions or concerns arise.    Bia Bo RN

## 2025-06-02 ENCOUNTER — TELEPHONE (OUTPATIENT)
Dept: PRIMARY CARE | Facility: CLINIC | Age: 70
End: 2025-06-02
Payer: MEDICARE

## 2025-06-02 NOTE — PROGRESS NOTES
Pt's wife Zabrina called in stating that New's Left eye has been red since Saturday. She states this happens when he has an exacerbation of Rosacea. There is no drainage from the eye and New is denying any irritation, pain or changes to his vision. They have been using Refresh eye drops which reduce the redness temporarily. She is wondering if there is anything else that they should be using. She reports that she would not be able to have him transported in today or tomorrow for a sick visit as he uses [id number] and they need 24 hours notice. These concerns will be conveyed to Dr. Foreman.

## 2025-06-03 ENCOUNTER — PATIENT OUTREACH (OUTPATIENT)
Dept: PRIMARY CARE | Facility: CLINIC | Age: 70
End: 2025-06-03
Payer: MEDICARE

## 2025-06-03 DIAGNOSIS — I69.351 HEMIPLEGIA AND HEMIPARESIS FOLLOWING CEREBRAL INFARCTION AFFECTING RIGHT DOMINANT SIDE (MULTI): ICD-10-CM

## 2025-06-03 DIAGNOSIS — I63.9 ISCHEMIC CEREBROVASCULAR ACCIDENT (CVA) (MULTI): ICD-10-CM

## 2025-06-03 DIAGNOSIS — L71.9 ROSACEA: ICD-10-CM

## 2025-06-03 ASSESSMENT — ACTIVITIES OF DAILY LIVING (ADL): BATHING: NO

## 2025-06-03 NOTE — PROGRESS NOTES
Care Management Monthly Outreach  Chart review completed-Yes   Confirmation of at least 2 patient identifiers-Yes   Change in insurance? No    Has patient been to ER/Urgent Care since last outreach? No    Last Office Visit with PCP: 2/14/2025   Next Office Visit with PCP: 8/8/2025   APC Collaboration: n/a    Chronic Conditions and Outreach Summary:   Hemiplegia and hemiparesis following cerebral infarction affecting right dominant side (Multi)    Rosacea    Ischemic cerebrovascular accident (CVA) (Multi)    With New's permission, I spoke with his wife Zabrina today. I called Zabrina to let her know that Dr. Foreman's recommendation is that New be evaluated by a Provider to have his Left eye examined for the redness and irritation that she called in to report yesterday. Dr. Foreman suggested the  Virtual [facility] as it is difficult for New to get to appointments on short notice as he requires a wheelchair and transportation needs to be arranged ahead of time. Zabrina states that they currently only have her laptop from work to use for virtual appointments and this would not be allowed so we discussed options for Urgent Care ([facility]) and she elected to have him taken to the  Urgent Care location in [place] as soon as she is able to arrange for transportation for him. Zabrina states that New does not have any other needs at this time.     Medications:   Are there medication changes since last visit? No  Refills needed? No    Social Drivers of Health: Deferred  Care Gaps Addressed? Deferred  Care Plan addressed: Yes    Upcoming Appointments:   Future Appointments       Date / Time Provider Department Dept Phone    6/11/2025 1:45 PM HATTIE Anglin [facility] [phone]    6/18/2025 1:45 PM HATTIE [facility]in  [facility] [phone]    6/25/2025 1:00 PM HATTIE Anglin  [facility] [phone]    7/2/2025 1:45 PM HATTIE Anglin  [facility] [phone]    7/9/2025 1:45 PM Aracely Grimaldo, SLP [facility] [phone]    7/16/2025 1:45 PM HATTIE Anglin [facility] [phone]    7/23/2025 1:45 PM Aracely Grimaldo, SLP [facility] [phone]    7/24/2025 11:30 AM Ced Vasquez MD [facility] [phone]    8/8/2025 11:00 AM (Arrive by 10:45 AM) Gillian Foreman DO [facility] [phone]    9/4/2025 10:30 AM Bean Vu MD [facility] [phone]          Blood Pressures Reviewed  BP Readings from Last 3 Encounters:   03/17/25 125/69   11/27/24 117/65   10/29/24 120/80     Labs Reviewed:  Lab Results   Component Value Date    CREATININE 0.69 (L) 02/14/2025    GLUCOSE 86 02/14/2025    ALKPHOS 86 02/14/2025    K 4.2 02/14/2025    PROT 6.6 02/14/2025     02/14/2025    CALCIUM 9.1 02/14/2025    AST 18 02/14/2025    ALT 20 02/14/2025    BUN 16 02/14/2025    GFRMALE 90 04/18/2022     Lab Results   Component Value Date    TRIG 148 11/13/2023    CHOL 128 11/13/2023    LDLCALC 63 11/13/2023    HDL 35.9 11/13/2023     Lab Results   Component Value Date    HGBA1C 5.6 02/14/2025    HGBA1C 5.1 04/23/2024    HGBA1C 5.7 (H) 07/23/2023     Lab Results   Component Value Date    WBC 7.3 11/13/2023    RBC 5.00 11/13/2023    HGB 14.7 11/13/2023     11/13/2023   No other concerns at this time.  Agreeable to continue monthly outreaches.  Encouraged to call if questions or concerns arise.    Bia Bo RN

## 2025-06-11 ENCOUNTER — TREATMENT (OUTPATIENT)
Dept: SPEECH THERAPY | Facility: CLINIC | Age: 70
End: 2025-06-11
Payer: MEDICARE

## 2025-06-11 DIAGNOSIS — I69.390 APRAXIA FOLLOWING CEREBROVASCULAR ACCIDENT: ICD-10-CM

## 2025-06-11 DIAGNOSIS — I69.320 APHASIA FOLLOWING CEREBRAL INFARCTION: ICD-10-CM

## 2025-06-11 PROCEDURE — 92507 TX SP LANG VOICE COMM INDIV: CPT | Mod: GN

## 2025-06-11 ASSESSMENT — PAIN SCALES - GENERAL: PAINLEVEL_OUTOF10: 0 - NO PAIN

## 2025-06-11 ASSESSMENT — PAIN - FUNCTIONAL ASSESSMENT: PAIN_FUNCTIONAL_ASSESSMENT: 0-10

## 2025-06-11 NOTE — PROGRESS NOTES
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       SLP Assessment:  SLP TX Intervention Outcome: Making Progress Towards Goals  Treatment Provided: Yes, see Objective for details  Treatment Tolerance: Patient tolerated treatment well      Plan:  SLP TX Plan: Continue Plan of Care  SLP Frequency: 1x/week  SLP Duration: 12 weeks  Discussed POC: Yes, Patient, aide  Discussed Risks/Benefits: Yes, Patient, aide  Patient/Caregiver Agreeable: Yes  Next Session Plan: Address reading and auditory comprehension for single words and phrases; address writing/typing for single/closed set of words; HEP  HEP: [employer] Advanced Language 4-in-1 mandy (auditory comprehension -> identify [lv 1,2,3,5,6], follow [lv 1,3)      Subjective   Patient arrived to treatment via transportation service and attended therapy accompanied by his aide. This aide has not come with patient before, but excellent rapport apparent between patient and aide. He reported that patient has been working on all [employer] activities and doing well. He also shared that patient has been copying a closed set of single words of importance to him (on note cards that he shared during the visit). Patient brought his SGD and tablet to today's visit. Tablet used during the session.      General Visit Information:  Number of Authorized Treatments : Based on MN (Shea)  Total Number of Visits : 35      Pain Assessment:  Pain Assessment: 0-10  Pain Score: 0 - No pain      Objective     Re-Assessment 1/21/2025:    Oral Mechanism Exam / Cranial Nerve Exam:  Trigeminal Nerve (V)     Jaw ROM: WFL  Facial Nerve (VII)     Asymmetry at rest:  Right sided facial weakness     Lip pucker: Reduced (R)     Smile: Reduced (R)     Lip/smile alternation: DNT  Glossopharyngeal, Vagus (IX, X)     Uvula at rest: WFL     Palate at rest: WFL     Palatal elevation /a/: CNT (unable to volitionally vocalize)  Hypoglossal (XII)     Tongue at rest: WFL     Tongue protrusion: WFL     Side to side: Reduced (R)     Tongue elevation: WFL      [place] Diagnostic Aphasia Evaluation (BDAE) Short Form:  --Word Comprehension (auditory): 8/14 (57%) [prev. 10/14 (71%)]  --Word/Picture Match (F/O 4 Words): 8/15 (53%) [prev. 8/15 (53%)]  --Short Sentence Reading Comprehension (F/O 4 Choices): 2/4 (50%) [prev. 2/4 (50%)]     Other:  --Yes/No Questions (yes/no via SGD): 4/9 (44%) [prev. 5/8 (63%)]  --Following Commands (1-Step): 1/4 (25%) [prev. 1/3 (33%)]  --Written Expression (Typing name from memory): 3/5 letters indep., only 1 letter in correct position    *Limited progress evident across all assessment measures. Therapist requested input from patient's wife re: goal setting. Improved use of and independence with SGD identified as a goal. Patient indicated that he doesn't know how to use his device well vs not wanting to use it.       GOALS: Start date: 5/14/2025; anticipated end/re-eval date: 8/12/2025  By discharge:  LTG: Patient will comprehend communication and express information for basic wants/needs/ideas using total communication in order to increase participation in the home and community environments.  > GOAL STATUS: Ongoing; progressing    STGs:   1. Patient will perform auditory comprehension tasks (e.g. yes/no questions, etc.) using total communication with 90% accuracy given mild cueing.  > NOT ADDRESSED.  > GOAL STATUS: Ongoing; progressing      3. Patient will perform reading comprehension tasks at the single word level with 70% accuracy given mild cueing in order to improve navigation/use of SGD.  > ADDRESSED: Reading addressed via [employer] Advanced Language  4-in-1 mandy. He matched sentences and pictures with the following accuracies: levels 1-4: 60%; levels 3-6: 50%; levels 1-2: 90%; level 3: 90%; level 5: 60%. Directness of the sentence structure for levels 1,2,3,5 (F/O 4 pics): 7/10 (70%)indep; level 5 (F/O 4 pics): 8/10 (80%), mild cueing; level 6 (F/O 4 pics): 2/3 (67%), mild cueing; level 6 (F/O 2 pics): 10/10 (100%) indep. When given a F/O 4, patient could reliably reduce the field to 2, including the correct picture. He benefited from cueing to look at the main components of each sentence (nouns, verb). Instructions for home practice provided. Revisit at a subsequent session and continue to advance as appropriate.  > GOAL STATUS: Ongoing; progressing    4. Patient will perform written expression tasks at the single word level for closed set of 5-10 patient/caregiver-selected vocabulary with 70% accuracy given mild cueing in order to improve communicative interactions using SGD.  > NOT ADDRESSED.  > GOAL STATUS: Ongoing; progressing      6. Patient will navigate his speech generating device to select appropriate vocabulary items for functional communication in 3 out of 5 opportunities.  > NOT ADDRESSED.  > GOAL STATUS: Ongoing; progressing    7. Patient/caregivers will express understanding of and will report adherence to recommended HEP.  > ADDRESSED: Therapist assigned continuation of [employer] activities for HEP. Therapist showed aide how to increase/decrease complexity based upon how many choices patient is given for each task. Patient/aide indicated understanding.  > GOAL STATUS: Ongoing; progressing      Outpatient Education:  Individual(s) Educated: Patient, aide  Written Education: HEP instructions re: [employer] Therapy  Verbal Education : Feedback for session performance; HEP instructions re: [employer] Therapy  Risk and Benefits Discussed with Patient/Caregiver/Other: yes  Patient/Caregiver Demonstrated Understanding: yes  Plan of Care Discussed and Agreed  Upon: yes  Patient Response to Education: Patient/Caregiver Verbalized Understanding of Information, Patient/Caregiver Asked Appropriate Questions

## 2025-06-13 DIAGNOSIS — G47.09 OTHER INSOMNIA: ICD-10-CM

## 2025-06-18 ENCOUNTER — TREATMENT (OUTPATIENT)
Dept: SPEECH THERAPY | Facility: CLINIC | Age: 70
End: 2025-06-18
Payer: MEDICARE

## 2025-06-18 DIAGNOSIS — I69.390 APRAXIA FOLLOWING CEREBROVASCULAR ACCIDENT: Primary | ICD-10-CM

## 2025-06-18 DIAGNOSIS — I69.320 APHASIA FOLLOWING CEREBRAL INFARCTION: ICD-10-CM

## 2025-06-18 PROCEDURE — 92507 TX SP LANG VOICE COMM INDIV: CPT | Mod: GN

## 2025-06-18 ASSESSMENT — PAIN - FUNCTIONAL ASSESSMENT: PAIN_FUNCTIONAL_ASSESSMENT: 0-10

## 2025-06-18 ASSESSMENT — PAIN SCALES - GENERAL: PAINLEVEL_OUTOF10: 0 - NO PAIN

## 2025-06-18 NOTE — PROGRESS NOTES
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 He demo'd good maintenance of auditory comprehension for 1-step directions with 1-2 components. He advanced to 2 step directions with objects only but required multiple repetitions for success. HEP advanced.      Plan:  SLP TX Plan: Continue Plan of Care  SLP Frequency: 1x/week  SLP Duration: 12 weeks  Discussed POC: Yes, Patient, aide  Discussed Risks/Benefits: Yes, Patient, aide  Patient/Caregiver Agreeable: Yes  Next Session Plan: Address reading and auditory comprehension for single words and phrases; address writing/typing for single/closed set of words; HEP  HEP: [employer] Advanced Language 4-in-1 mandy (auditory comprehension -> identify [lv 1,2,3,5,6], follow [lv 1,3,4)      Subjective   Patient arrived to treatment via transportation service and attended therapy accompanied by his aide. They arrived 20 minutes late. Therapist's schedule was able to accommodate and make up for 10 missed minutes of session. Patient brought his SGD and tablet to today's visit. Tablet used during the session.      General Visit Information:  Number of Authorized Treatments : Based on MN (Shea)  Total Number of Visits : 36      Pain Assessment:  Pain Assessment: 0-10  Pain Score: 0 - No pain      Objective     Re-Assessment  1/21/2025:    Oral Mechanism Exam / Cranial Nerve Exam:  Trigeminal Nerve (V)     Jaw ROM: WFL  Facial Nerve (VII)     Asymmetry at rest: Right sided facial weakness     Lip pucker: Reduced (R)     Smile: Reduced (R)     Lip/smile alternation: DNT  Glossopharyngeal, Vagus (IX, X)     Uvula at rest: WFL     Palate at rest: WFL     Palatal elevation /a/: CNT (unable to volitionally vocalize)  Hypoglossal (XII)     Tongue at rest: WFL     Tongue protrusion: WFL     Side to side: Reduced (R)     Tongue elevation: WFL      [place] Diagnostic Aphasia Evaluation (BDAE) Short Form:  --Word Comprehension (auditory): 8/14 (57%) [prev. 10/14 (71%)]  --Word/Picture Match (F/O 4 Words): 8/15 (53%) [prev. 8/15 (53%)]  --Short Sentence Reading Comprehension (F/O 4 Choices): 2/4 (50%) [prev. 2/4 (50%)]     Other:  --Yes/No Questions (yes/no via SGD): 4/9 (44%) [prev. 5/8 (63%)]  --Following Commands (1-Step): 1/4 (25%) [prev. 1/3 (33%)]  --Written Expression (Typing name from memory): 3/5 letters indep., only 1 letter in correct position    *Limited progress evident across all assessment measures. Therapist requested input from patient's wife re: goal setting. Improved use of and independence with SGD identified as a goal. Patient indicated that he doesn't know how to use his device well vs not wanting to use it.       GOALS: Start date: 5/14/2025; anticipated end/re-eval date: 8/12/2025  By discharge:  LTG: Patient will comprehend communication and express information for basic wants/needs/ideas using total communication in order to increase participation in the home and community environments.  > GOAL STATUS: Ongoing; progressing    STGs:   1. Patient will perform auditory comprehension tasks (e.g. yes/no questions, etc.) using total communication with 90% accuracy given mild cueing.  > ADDRESSED: Following auditory directions task performed via [employer] Advanced Language 4-in-1 mandy (Follow). Patient performed 1-step  directions as follows: lvl 1: 77% accuracy indep with repetitions; lvl 3: 82% accuracy indep with repetitions; lvl 4: 67% accuracy indep with repetitions. HEP added to.  > GOAL STATUS: Ongoing; progressing      3. Patient will perform reading comprehension tasks at the single word level with 70% accuracy given mild cueing in order to improve navigation/use of SGD.  > NOT ADDRESSED.  > GOAL STATUS: Ongoing; progressing    4. Patient will perform written expression tasks at the single word level for closed set of 5-10 patient/caregiver-selected vocabulary with 70% accuracy given mild cueing in order to improve communicative interactions using SGD.  > ADDRESSED: Copy and Recall Treatment (CART) was implemented with patient-specific vocabulary (Zabrina Rocha, phone passcode). Patient copied each word x3 with 100% accuracy given minimal cueing. He subsequently typed words from memory with 100% accuracy given mild verbal and/or visual cueing. Therapist encouraged this approach for HEP.  > GOAL STATUS: Ongoing; progressing      6. Patient will navigate his speech generating device to select appropriate vocabulary items for functional communication in 3 out of 5 opportunities.  > NOT ADDRESSED.  > GOAL STATUS: Ongoing; progressing    7. Patient/caregivers will express understanding of and will report adherence to recommended HEP.  > ADDRESSED: Therapist assigned continuation of [employer] activities for HEP. Copy and Recall Treatment (CART) also discussed as approach to utilize for spelling practice at home. Patient/aide indicated understanding.  > GOAL STATUS: Ongoing; progressing      Outpatient Education:  Individual(s) Educated: Patient, aide  Verbal Education : Feedback for session performance; HEP instructions re: [employer], CART  Risk and Benefits Discussed with Patient/Caregiver/Other: yes  Patient/Caregiver Demonstrated Understanding: yes  Plan of Care Discussed and Agreed Upon: yes  Patient Response to  Education: Patient/Caregiver Verbalized Understanding of Information, Patient/Caregiver Asked Appropriate Questions

## 2025-06-25 ENCOUNTER — TREATMENT (OUTPATIENT)
Dept: SPEECH THERAPY | Facility: CLINIC | Age: 70
End: 2025-06-25
Payer: MEDICARE

## 2025-06-25 DIAGNOSIS — I69.390 APRAXIA FOLLOWING CEREBROVASCULAR ACCIDENT: ICD-10-CM

## 2025-06-25 DIAGNOSIS — I69.320 APHASIA FOLLOWING CEREBRAL INFARCTION: ICD-10-CM

## 2025-06-25 PROCEDURE — 92507 TX SP LANG VOICE COMM INDIV: CPT | Mod: GN

## 2025-06-25 ASSESSMENT — PAIN - FUNCTIONAL ASSESSMENT: PAIN_FUNCTIONAL_ASSESSMENT: 0-10

## 2025-06-25 ASSESSMENT — PAIN SCALES - GENERAL: PAINLEVEL_OUTOF10: 0 - NO PAIN

## 2025-06-25 NOTE — PROGRESS NOTES
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 He demo'd improved reading comprehension for more advanced/complex sentence structures. HEP advanced. Patient had difficulty with very short paragraph passages with F/U questions.       Plan:  SLP TX Plan: Continue Plan of Care  SLP Frequency: 1x/week  SLP Duration: 12 weeks  Discussed POC: Yes, Patient, aide  Discussed Risks/Benefits: Yes, Patient, aide  Patient/Caregiver Agreeable: Yes  Next Session Plan: Address reading and auditory comprehension for single words and phrases; address writing/typing for single/closed set of words; HEP  HEP: [employer] Advanced Language 4-in-1 mandy (auditory comprehension -> identify [lv 1,2,3,5,6,711, follow [lv 1,3,4])      Subjective   Patient arrived to treatment via transportation service and attended therapy accompanied by his aide. Patient brought his SGD and tablet to today's visit. Tablet used during the session.      General Visit Information:  Number of Authorized Treatments : Based on MN (Shea)  Total Number of Visits : 37      Pain Assessment:  Pain Assessment: 0-10  Pain Score: 0 - No pain      Objective     Re-Assessment 1/21/2025:    Oral Mechanism Exam / Cranial Nerve Exam:  Trigeminal Nerve (V)     Jaw ROM: WFL  Facial Nerve (VII)     Asymmetry at rest: Right sided facial weakness     Lip  pucker: Reduced (R)     Smile: Reduced (R)     Lip/smile alternation: DNT  Glossopharyngeal, Vagus (IX, X)     Uvula at rest: WFL     Palate at rest: WFL     Palatal elevation /a/: CNT (unable to volitionally vocalize)  Hypoglossal (XII)     Tongue at rest: WFL     Tongue protrusion: WFL     Side to side: Reduced (R)     Tongue elevation: WFL      [place] Diagnostic Aphasia Evaluation (BDAE) Short Form:  --Word Comprehension (auditory): 8/14 (57%) [prev. 10/14 (71%)]  --Word/Picture Match (F/O 4 Words): 8/15 (53%) [prev. 8/15 (53%)]  --Short Sentence Reading Comprehension (F/O 4 Choices): 2/4 (50%) [prev. 2/4 (50%)]     Other:  --Yes/No Questions (yes/no via SGD): 4/9 (44%) [prev. 5/8 (63%)]  --Following Commands (1-Step): 1/4 (25%) [prev. 1/3 (33%)]  --Written Expression (Typing name from memory): 3/5 letters indep., only 1 letter in correct position    *Limited progress evident across all assessment measures. Therapist requested input from patient's wife re: goal setting. Improved use of and independence with SGD identified as a goal. Patient indicated that he doesn't know how to use his device well vs not wanting to use it.       GOALS: Start date: 5/14/2025; anticipated end/re-eval date: 8/12/2025  By discharge:  LTG: Patient will comprehend communication and express information for basic wants/needs/ideas using total communication in order to increase participation in the home and community environments.  > GOAL STATUS: Ongoing; progressing    STGs:   1. Patient will perform auditory comprehension tasks (e.g. yes/no questions, etc.) using total communication with 90% accuracy given mild cueing.  > ADDRESSED: Following auditory directions task performed via [employer] Advanced Language 4-in-1 mandy (Follow). Patient performed 1-2 step directions (levels 1,3,4) with 80% accuracy given multiple repetitions. Established HEP cont'd.  > GOAL STATUS: Ongoing; progressing      3. Patient will perform reading  comprehension tasks at the single word level with 70% accuracy given mild cueing in order to improve navigation/use of SGD.  > ADDRESSED: Reading addressed via [employer] Advanced Language 4-in-1 mandy. He matched sentences and pictures (F/O 4 pictures) with the following accuracies: levels 1,2,3,5,6: 80%; level 6: 70%; level 7: 100%; level 11: 100%. HEP advanced to include levels 7,11. Short paragraph passages with F/U questions addressed. When reading only, patient answered F/U questions (F/O 2 choices) with 33% accuracy independently. When he listened to and read passages simultaneously, he answered questions with 50% accuracy.   > GOAL STATUS: Ongoing; progressing    4. Patient will perform written expression tasks at the single word level for closed set of 5-10 patient/caregiver-selected vocabulary with 70% accuracy given mild cueing in order to improve communicative interactions using SGD.  > NOT ADDRESSED.  > GOAL STATUS: Ongoing; progressing      6. Patient will navigate his speech generating device to select appropriate vocabulary items for functional communication in 3 out of 5 opportunities.  > NOT ADDRESSED.  > GOAL STATUS: Ongoing; progressing    7. Patient/caregivers will express understanding of and will report adherence to recommended HEP.  > ADDRESSED: Therapist assigned continuation of [employer] activities for HEP. Copy and Recall Treatment (CART) also discussed as approach to utilize for spelling practice at home. Patient/aide indicated understanding.  > GOAL STATUS: Ongoing; progressing      Outpatient Education:  Individual(s) Educated: Patient, aide  Verbal Education : Feedback for session performance; HEP instructions re: [employer], CART  Risk and Benefits Discussed with Patient/Caregiver/Other: yes  Patient/Caregiver Demonstrated Understanding: yes  Plan of Care Discussed and Agreed Upon: yes  Patient Response to Education: Patient/Caregiver Verbalized Understanding of  Information, Patient/Caregiver Asked Appropriate Questions

## 2025-07-02 ENCOUNTER — TREATMENT (OUTPATIENT)
Dept: SPEECH THERAPY | Facility: CLINIC | Age: 70
End: 2025-07-02
Payer: MEDICARE

## 2025-07-02 DIAGNOSIS — I69.320 APHASIA FOLLOWING CEREBRAL INFARCTION: ICD-10-CM

## 2025-07-02 DIAGNOSIS — I69.390 APRAXIA FOLLOWING CEREBROVASCULAR ACCIDENT: ICD-10-CM

## 2025-07-02 PROCEDURE — 92507 TX SP LANG VOICE COMM INDIV: CPT | Mod: GN,KX

## 2025-07-02 ASSESSMENT — PAIN SCALES - GENERAL: PAINLEVEL_OUTOF10: 0 - NO PAIN

## 2025-07-02 ASSESSMENT — PAIN - FUNCTIONAL ASSESSMENT: PAIN_FUNCTIONAL_ASSESSMENT: 0-10

## 2025-07-02 NOTE — PROGRESS NOTES
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 He had increased difficulty performing reading comprehension tasks for inverse sentence structures (e.g. the dog was chased by the cat vs the dog chased the cat). Cont'd use of HEP encouraged.       Plan:  SLP TX Plan: Continue Plan of Care  SLP Frequency: 1x/week  SLP Duration: 12 weeks  Discussed POC: Yes, Patient, aide  Discussed Risks/Benefits: Yes, Patient, aide  Patient/Caregiver Agreeable: Yes  Next Session Plan: Address reading and auditory comprehension for single words and phrases; address writing/typing for single/closed set of words; HEP  HEP: [employer] Advanced Language 4-in-1 mandy (auditory comprehension -> identify [lv 1,2,3,5,6,711, follow [lv 1,3,4]); CART with closed set of words      Subjective   Patient arrived to treatment via transportation service and attended therapy accompanied by his aide. Patient brought his SGD and tablet to today's visit. Tablet used during the session.      General Visit Information:  Number of Authorized Treatments : Based on MN (Shea)  Total Number of Visits : 38 (KX)      Pain Assessment:  Pain Assessment: 0-10  Pain Score: 0 - No pain      Objective     Re-Assessment 1/21/2025:    Oral Mechanism Exam / Cranial Nerve Exam:  Trigeminal Nerve (V)     Jaw ROM: WFL  Facial Nerve (VII)      Asymmetry at rest: Right sided facial weakness     Lip pucker: Reduced (R)     Smile: Reduced (R)     Lip/smile alternation: DNT  Glossopharyngeal, Vagus (IX, X)     Uvula at rest: WFL     Palate at rest: WFL     Palatal elevation /a/: CNT (unable to volitionally vocalize)  Hypoglossal (XII)     Tongue at rest: WFL     Tongue protrusion: WFL     Side to side: Reduced (R)     Tongue elevation: WFL      [place] Diagnostic Aphasia Evaluation (BDAE) Short Form:  --Word Comprehension (auditory): 8/14 (57%) [prev. 10/14 (71%)]  --Word/Picture Match (F/O 4 Words): 8/15 (53%) [prev. 8/15 (53%)]  --Short Sentence Reading Comprehension (F/O 4 Choices): 2/4 (50%) [prev. 2/4 (50%)]     Other:  --Yes/No Questions (yes/no via SGD): 4/9 (44%) [prev. 5/8 (63%)]  --Following Commands (1-Step): 1/4 (25%) [prev. 1/3 (33%)]  --Written Expression (Typing name from memory): 3/5 letters indep., only 1 letter in correct position    *Limited progress evident across all assessment measures. Therapist requested input from patient's wife re: goal setting. Improved use of and independence with SGD identified as a goal. Patient indicated that he doesn't know how to use his device well vs not wanting to use it.       GOALS: Start date: 5/14/2025; anticipated end/re-eval date: 8/12/2025  By discharge:  LTG: Patient will comprehend communication and express information for basic wants/needs/ideas using total communication in order to increase participation in the home and community environments.  > GOAL STATUS: Ongoing; progressing    STGs:   1. Patient will perform auditory comprehension tasks (e.g. yes/no questions, etc.) using total communication with 90% accuracy given mild cueing.  > NOT ADDRESSED.  > GOAL STATUS: Ongoing; progressing      3. Patient will perform reading comprehension tasks at the single word level with 70% accuracy given mild cueing in order to improve navigation/use of SGD.  > ADDRESSED: Reading addressed via [employer]  Advanced Language 4-in-1 mandy. He matched sentences and pictures (F/O 4 pictures) with the following accuracies: level 6: 50% (prev. 70%).  > GOAL STATUS: Ongoing; progressing    4. Patient will perform written expression tasks at the single word level for closed set of 5-10 patient/caregiver-selected vocabulary with 70% accuracy given mild cueing in order to improve communicative interactions using SGD.  > ADDRESSED: Copy and Recall Treatment (CART) approach used today with closed set of words (Aj, Zabrina, Jacqueline). Patient copied each word x3 given minimal-0 assist. He subsequently typed words correctly from memory in 33% of opportunities independently, 100% of opportunities given mild-mod cueing for 1-2 letters/word. Therapist provided phonemic cues to assist patient with letter location from which patient seemed to benefit. Noted improvement of skills from prior visits.  > GOAL STATUS: Ongoing; progressing      6. Patient will navigate his speech generating device to select appropriate vocabulary items for functional communication in 3 out of 5 opportunities.  > NOT ADDRESSED.  > GOAL STATUS: Ongoing; progressing    7. Patient/caregivers will express understanding of and will report adherence to recommended HEP.  > ADDRESSED: Therapist assigned continuation of [employer] activities for HEP. Copy and Recall Treatment (CART) with closed set of words also encouraged for home. Patient/aide indicated understanding.  > GOAL STATUS: Ongoing; progressing      Outpatient Education:  Individual(s) Educated: Patient, aide  Verbal Education : Feedback for session performance; HEP instructions re: [employer], CART  Risk and Benefits Discussed with Patient/Caregiver/Other: yes  Patient/Caregiver Demonstrated Understanding: yes  Plan of Care Discussed and Agreed Upon: yes  Patient Response to Education: Patient/Caregiver Verbalized Understanding of Information, Patient/Caregiver Asked Appropriate Questions

## 2025-07-07 RX ORDER — QUETIAPINE FUMARATE 25 MG/1
25 TABLET, FILM COATED ORAL NIGHTLY
Qty: 30 TABLET | Refills: 0 | Status: SHIPPED | OUTPATIENT
Start: 2025-07-07

## 2025-07-07 RX ORDER — QUETIAPINE FUMARATE 25 MG/1
25 TABLET, FILM COATED ORAL NIGHTLY
Qty: 30 TABLET | Refills: 0 | Status: SHIPPED | OUTPATIENT
Start: 2025-07-07 | End: 2025-07-07

## 2025-07-08 NOTE — TELEPHONE ENCOUNTER
Prescription Request    PCP: Gillian Foreman DO    90 day Rx request from [facility] for the following:   - QUEtiapine 25mg at bedtime   Dx code G47.09  Approved: 30 days supply until follow-up    Follow-up Visit Date: 8/8/25  Last seen: CPE: 10/29/24 - RTC 3-4m but at least 1yr for insomnia    Monitoring Parameters  3/17/25: /69  HR 61  10/29/24: Depression/PHQ-9 score 2

## 2025-07-08 NOTE — PROGRESS NOTES
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 He had increased difficulty performing reading comprehension tasks for inverse sentence structures (e.g. the dog was chased by the cat vs the dog chased the cat). Cont'd use of HEP encouraged.       Plan:  SLP TX Plan: Continue Plan of Care  SLP Frequency: 1x/week  SLP Duration: 12 weeks  Discussed POC: Yes, Patient, aide  Discussed Risks/Benefits: Yes, Patient, aide  Patient/Caregiver Agreeable: Yes  Next Session Plan: Address reading and auditory comprehension for single words and phrases; address writing/typing for single/closed set of words; HEP  HEP: [facility] Advanced Language 4-in-1 mandy (auditory comprehension -> identify [lv 1,2,3,5,6,711, follow [lv 1,3,4]); CART with closed set of words      Subjective   Patient arrived to treatment via transportation service and attended therapy accompanied by his aide. Patient brought his SGD and tablet to today's visit. Tablet used during the session.      General Visit Information:  Number of Authorized Treatments : Based on MN ([place])  Total Number of Visits : 38 (KX)      Pain Assessment:  Pain Assessment: 0-10  Pain Score: 0 - No pain      Objective     Re-Assessment 1/21/2025:    Oral Mechanism Exam / Cranial Nerve Exam:  Trigeminal Nerve (V)     Jaw ROM: WFL  Facial Nerve (VII)     Asymmetry at rest: Right sided facial weakness     Lip pucker: Reduced (R)     Smile: Reduced (R)     Lip/smile alternation: DNT  Glossopharyngeal, Vagus (IX, X)     Uvula at  rest: WFL     Palate at rest: WFL     Palatal elevation /a/: CNT (unable to volitionally vocalize)  Hypoglossal (XII)     Tongue at rest: WFL     Tongue protrusion: WFL     Side to side: Reduced (R)     Tongue elevation: WFL      [employer] Diagnostic Aphasia Evaluation (BDAE) Short Form:  --Word Comprehension (auditory): 8/14 (57%) [prev. 10/14 (71%)]  --Word/Picture Match (F/O 4 Words): 8/15 (53%) [prev. 8/15 (53%)]  --Short Sentence Reading Comprehension (F/O 4 Choices): 2/4 (50%) [prev. 2/4 (50%)]     Other:  --Yes/No Questions (yes/no via SGD): 4/9 (44%) [prev. 5/8 (63%)]  --Following Commands (1-Step): 1/4 (25%) [prev. 1/3 (33%)]  --Written Expression (Typing name from memory): 3/5 letters indep., only 1 letter in correct position    *Limited progress evident across all assessment measures. Therapist requested input from patient's wife re: goal setting. Improved use of and independence with SGD identified as a goal. Patient indicated that he doesn't know how to use his device well vs not wanting to use it.       GOALS: Start date: 5/14/2025; anticipated end/re-eval date: 8/12/2025  By discharge:  LTG: Patient will comprehend communication and express information for basic wants/needs/ideas using total communication in order to increase participation in the home and community environments.  > GOAL STATUS: Ongoing; progressing    STGs:   1. Patient will perform auditory comprehension tasks (e.g. yes/no questions, etc.) using total communication with 90% accuracy given mild cueing.  > NOT ADDRESSED.  > GOAL STATUS: Ongoing; progressing      3. Patient will perform reading comprehension tasks at the single word level with 70% accuracy given mild cueing in order to improve navigation/use of SGD.  > ADDRESSED: Reading addressed via [employer] Advanced Language 4-in-1 mandy. He matched sentences and pictures (F/O 4 pictures) with the following accuracies: level 6: 50% (prev. 70%).  > GOAL STATUS: Ongoing;  progressing    4. Patient will perform written expression tasks at the single word level for closed set of 5-10 patient/caregiver-selected vocabulary with 70% accuracy given mild cueing in order to improve communicative interactions using SGD.  > ADDRESSED: Copy and Recall Treatment (CART) approach used today with closed set of words (Zabrina Rocha, Jacqueline). Patient copied each word x3 given minimal-0 assist. He subsequently typed words correctly from memory in 33% of opportunities independently, 100% of opportunities given mild-mod cueing for 1-2 letters/word. Therapist provided phonemic cues to assist patient with letter location from which patient seemed to benefit. Noted improvement of skills from prior visits.  > GOAL STATUS: Ongoing; progressing      6. Patient will navigate his speech generating device to select appropriate vocabulary items for functional communication in 3 out of 5 opportunities.  > NOT ADDRESSED.  > GOAL STATUS: Ongoing; progressing    7. Patient/caregivers will express understanding of and will report adherence to recommended HEP.  > ADDRESSED: Therapist assigned continuation of [employer] activities for HEP. Copy and Recall Treatment (CART) with closed set of words also encouraged for home. Patient/aide indicated understanding.  > GOAL STATUS: Ongoing; progressing      Outpatient Education:  Individual(s) Educated: Patient, aide  Verbal Education : Feedback for session performance; HEP instructions re: [employer], CART  Risk and Benefits Discussed with Patient/Caregiver/Other: yes  Patient/Caregiver Demonstrated Understanding: yes  Plan of Care Discussed and Agreed Upon: yes  Patient Response to Education: Patient/Caregiver Verbalized Understanding of Information, Patient/Caregiver Asked Appropriate Questions

## 2025-07-09 ENCOUNTER — TELEPHONE (OUTPATIENT)
Dept: PRIMARY CARE | Facility: CLINIC | Age: 70
End: 2025-07-09
Payer: MEDICARE

## 2025-07-09 ENCOUNTER — APPOINTMENT (OUTPATIENT)
Dept: SPEECH THERAPY | Facility: CLINIC | Age: 70
End: 2025-07-09
Payer: MEDICARE

## 2025-07-09 ENCOUNTER — DOCUMENTATION (OUTPATIENT)
Dept: SPEECH THERAPY | Facility: CLINIC | Age: 70
End: 2025-07-09
Payer: MEDICARE

## 2025-07-09 NOTE — PROGRESS NOTES
Speech-Language Pathology                 Therapy Communication Note    Patient Name: New Pierre  MRN: [id number]  Today's Date: 7/9/2025     Discipline: Speech Language Pathology    Missed Visit Reason: Patient cancelled his scheduled Speech Therapy appointment this date.    Missed Time: Cancel

## 2025-07-16 ENCOUNTER — TREATMENT (OUTPATIENT)
Dept: SPEECH THERAPY | Facility: CLINIC | Age: 70
End: 2025-07-16
Payer: MEDICARE

## 2025-07-16 DIAGNOSIS — I69.320 APHASIA FOLLOWING CEREBRAL INFARCTION: ICD-10-CM

## 2025-07-16 DIAGNOSIS — I69.390 APRAXIA FOLLOWING CEREBROVASCULAR ACCIDENT: ICD-10-CM

## 2025-07-16 PROCEDURE — 92507 TX SP LANG VOICE COMM INDIV: CPT | Mod: GN

## 2025-07-16 ASSESSMENT — PAIN - FUNCTIONAL ASSESSMENT: PAIN_FUNCTIONAL_ASSESSMENT: 0-10

## 2025-07-16 ASSESSMENT — PAIN SCALES - GENERAL: PAINLEVEL_OUTOF10: 0 - NO PAIN

## 2025-07-16 NOTE — PROGRESS NOTES
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 After a delay or for other words addressed beyond CART approach, patient was unable to type words correctly without assist. Patient demo'd good auditory comprehension of complex sentence-level activity independently. HEP updated, and home use encouraged.       Plan:  SLP TX Plan: Continue Plan of Care  SLP Frequency: 1x/week  SLP Duration: 12 weeks  Discussed POC: Yes, Patient, aide  Discussed Risks/Benefits: Yes, Patient, aide  Patient/Caregiver Agreeable: Yes  Next Session Plan: Address reading and auditory comprehension for single words and phrases; address writing/typing for single/closed set of words using CART; HEP  HEP:   [employer] Advanced Language 4-in-1 mandy:  Reading comprehension:  (read only) identify [lv 1,2,3,5,6,7,11]  Auditory comprehension:   (listen only) identify [lv 5-11]  (listen only) follow [lv 1,3,4]      Subjective   Patient arrived to treatment via transportation service and attended therapy accompanied by his aide. Patient brought his SGD and tablet to today's visit. Tablet used during the session.      General Visit Information:  Certification Period Start: 7/16/2025  Certification Period End: 10/14/2025  Number of Authorized Treatments : Based on MN ([facility])  Total Number of Visits : 39 (KX)      Pain  Assessment:  Pain Assessment: 0-10  Pain Score: 0 - No pain      Objective     Re-Assessment 1/21/2025:    Oral Mechanism Exam / Cranial Nerve Exam:  Trigeminal Nerve (V)     Jaw ROM: WFL  Facial Nerve (VII)     Asymmetry at rest: Right sided facial weakness     Lip pucker: Reduced (R)     Smile: Reduced (R)     Lip/smile alternation: DNT  Glossopharyngeal, Vagus (IX, X)     Uvula at rest: WFL     Palate at rest: WFL     Palatal elevation /a/: CNT (unable to volitionally vocalize)  Hypoglossal (XII)     Tongue at rest: WFL     Tongue protrusion: WFL     Side to side: Reduced (R)     Tongue elevation: WFL      [employer] Diagnostic Aphasia Evaluation (BDAE) Short Form:  --Word Comprehension (auditory): 8/14 (57%) [prev. 10/14 (71%)]  --Word/Picture Match (F/O 4 Words): 8/15 (53%) [prev. 8/15 (53%)]  --Short Sentence Reading Comprehension (F/O 4 Choices): 2/4 (50%) [prev. 2/4 (50%)]     Other:  --Yes/No Questions (yes/no via SGD): 4/9 (44%) [prev. 5/8 (63%)]  --Following Commands (1-Step): 1/4 (25%) [prev. 1/3 (33%)]  --Written Expression (Typing name from memory): 3/5 letters indep., only 1 letter in correct position    *Limited progress evident across all assessment measures. Therapist requested input from patient's wife re: goal setting. Improved use of and independence with SGD identified as a goal. Patient indicated that he doesn't know how to use his device well vs not wanting to use it.       GOALS: Start date: 5/14/2025; anticipated end/re-eval date: 8/12/2025  By discharge:  LTG: Patient will comprehend communication and express information for basic wants/needs/ideas using total communication in order to increase participation in the home and community environments.  > GOAL STATUS: Ongoing; progressing    STGs:   1. Patient will perform auditory comprehension tasks (e.g. yes/no questions, etc.) using total communication with 90% accuracy given mild cueing.  > ADDRESSED: Auditory comprehension addressed via  [facility] Advanced Language 4-in-1 mandy. After listening to a sentence presented orally, he identified the corresponding picture (F/O 4 pictures) with 90% accuracy for simpler sentence structures (levels 1,2,3,5,6,7,11) and 100% accuracy for more complex sentence structures (levels 5-11). HEP updated to include these activities.  > GOAL STATUS: Ongoing; progressing      3. Patient will perform reading comprehension tasks at the single word level with 70% accuracy given mild cueing in order to improve navigation/use of SGD.  > NOT ADDRESSED.  > GOAL STATUS: Ongoing; progressing    4. Patient will perform written expression tasks at the single word level for closed set of 5-10 patient/caregiver-selected vocabulary with 70% accuracy given mild cueing in order to improve communicative interactions using SGD.  > ADDRESSED: Copy and Recall Treatment (CART) approach used today with closed set of sight words (help, look). Patient copied each word x3 given minimal-0 assist. He subsequently typed words correctly from memory in 100% of opportunities given minimal-0 assist. After a brief delay, patient was unable to spell words from memory. When asked to type his name (Aj), he required moderate assist for accuracy. Therapist provided phonemic cues to assist patient with letter location from which patient did not seem to benefit this date.  > GOAL STATUS: Ongoing; progressing      6. Patient will navigate his speech generating device to select appropriate vocabulary items for functional communication in 3 out of 5 opportunities.  > NOT ADDRESSED.  > GOAL STATUS: Ongoing; progressing    7. Patient/caregivers will express understanding of and will report adherence to recommended HEP.  > ADDRESSED: Therapist updated HEP for [employer] activities. Handout of added activities provided. Copy and Recall Treatment (CART) with closed set of words also encouraged for home. Patient/aide indicated understanding.  > GOAL STATUS:  Ongoing; progressing      Outpatient Education:  Individual(s) Educated: Patient, aide  Verbal Education : Feedback for session performance; HEP instructions re: [employer], CART  Risk and Benefits Discussed with Patient/Caregiver/Other: yes  Patient/Caregiver Demonstrated Understanding: yes  Plan of Care Discussed and Agreed Upon: yes  Patient Response to Education: Patient/Caregiver Verbalized Understanding of Information, Patient/Caregiver Asked Appropriate Questions

## 2025-07-23 ENCOUNTER — APPOINTMENT (OUTPATIENT)
Dept: SPEECH THERAPY | Facility: CLINIC | Age: 70
End: 2025-07-23
Payer: MEDICARE

## 2025-07-24 ENCOUNTER — APPOINTMENT (OUTPATIENT)
Dept: NEUROLOGY | Facility: CLINIC | Age: 70
End: 2025-07-24
Payer: MEDICARE

## 2025-07-24 DIAGNOSIS — I69.351 HEMIPLEGIA AND HEMIPARESIS FOLLOWING CEREBRAL INFARCTION AFFECTING RIGHT DOMINANT SIDE (MULTI): ICD-10-CM

## 2025-07-24 DIAGNOSIS — M62.838 MUSCLE SPASTICITY: Primary | ICD-10-CM

## 2025-07-24 NOTE — PATIENT INSTRUCTIONS
You were injected with a total of 175 units of botox. You tolerated the procedure well. The effect of botox usually kicks in after 3 to 5 days and lasts for three months. Please call office after about a week to report how you are doing. Follow up after three months for repeat injections.      Please continue the same dose of baclofen.      Please continue occupational therapy.      Please continue daily stretching.      Thank you visiting the clinic today

## 2025-07-24 NOTE — PROGRESS NOTES
OUTPATIENT CLINIC NOTE    History of Present Illness: New Pierre is a 69 y.o. right handed male with a PMHx of L MCA ischemic stroke c/b b/l SAH (06/2023), HTN, HLD, depression who is referred for advanced spasticity evaluation. Will share the note with the referring physician via EMR.    Cause of spasticity: L ICA/MCA stroke  Cerebral spasticity: Yes  Spinal spasticity: No  Mobility: Wheel chair bound (Use leg brace)  Transfers: Unable to transfer independently, need helps from aids  Falls: No  AFO: Not wearing AFO now due to having to make some adjustment     Spasticity interferes with residual function: No - no residual function  Spasticity interferes with ROM and posture: Yes - for ROM, posture and appearance of the RUE  Spasticity causes pain and/or discomfort: No  Spasticity interferes with hygiene or skin care: No  UTIs and last urine check: No - no urine check  temperature effect: Cannot tell     Tonic spasms: No  Flexor: No  Extensor: No  Adductor: No  Isometric (cramps):  No  Complex (more than 1 at a time): No  Painful: No   Focal dystonia:  Yes, fixed focal elbow flexion and finger  Myoclonus: No   Spontaneous clonus:  No  Tremor: No  RLS: No     Treatment: No  Baclofen: -   Tizanidine: -   Dantrolene: -  Gabapentin: -  pregabalin: -   Tegretol: -  Trileptal: -  Anticholinergics: -   Dopamine agonist: -   Botulinum toxin: here to discuss.   Baclofen pump: -  Other treatments: -      PT: currently doing  OT: currently doing  Stretching: Have aids to help with ROM, daily stretching.   Exercise: Have aids to help with ROM      Goals of spasticity management:   Function: No  ROM/posture: Yes - help with ROM, posture and appearance of the RUE  Comfort: No  Hygiene and skin care:  No     Spasticity exam:  Modified Sergio scale:  R shoulder: Grade 2  R elbow: Grade 3  R knee: Grade 2   R ankle: Grade 2-3    [place] Spasm Frequency Scale:  Spasm Frequency:  0 = No spasm  1 = Mild spasms induced by  stimulation  2 = Infrequent full spasms occurring less than once per hour  3 = Spasms occurring more than once per hour  4 = Spasms occurring more than 10 times per hour  Spasm Severity:  1 = Mild  2 = Moderate  3 = Severe    Score = 0     ----------------------------  Extensor spasms: No  Flexor spasms No  Adductor spasms: No   Isometric spasms: No  Complex spasms: No  Abnormal fixed posture or fixed dystonia: dystonic flexion of the right elbow, wrist, and fingers.   Paroxysmal focal dystonia: No  Inducible clonus: No  Spontaneous clonus: No  Myoclonus: No  Tremor: No  RLS/akathisia:  No          Per chart review  On 6/8/2023, the patient found down with aphasia, right facial droop and right hemiparesis. Presented to [place] ED and intubated on arrival. S/P TNK. CTA (6/8/23) showed L ICA and L distal M1/ M2 occlusion. Patient underwent L CCA/ICA stenting and MT of the distal L ICA and attempy MT of distal M2/M3 segment complicated by contrast extravasation TICI 2b. Repeat CTH showed large L MCA and extensive b/l SAH. TNK reversed. Course complicated by occlusion of L ICA stent on ultrasound and reintubation due to secretion management. Trach and PEG placed 6/17. Also found to have right ankle fracture with conservative treatment. Discharged on ASA to [facility] on 6/21, in which he was treated for PNA. Trach/PEG removed 10/15.    Last visit with Dr. Vu 9/16/24 noted to continue to have aphasia and spastic right hemiparesis. Patient is being fitted for R leg brace so he can stand but still have R arm weakness and spasm. Patient was referred for potential right arm botox. Patient was continued on ASA and atorvastatin.    OT suggest spasticity     ROS: All systems reviewed and were negative except as above    Home Medications:    Current Outpatient Medications   Medication Instructions   • aspirin 325 mg, oral, Daily   • atorvastatin (LIPITOR) 40 mg, oral, Daily, Please schedule appointment for further refills   •  baclofen (Lioresal) 10 mg tablet Take 1 tablet (10 mg) by mouth once daily at bedtime for 90 days, THEN 1 tablet (10 mg) once daily at bedtime.   • dextromethorphan-guaifenesin (Mucinex DM)  mg 12 hr tablet 2 tablets, oral, As needed, Do not crush, chew, or split.   • Keppra 500 mg, oral, 2 times daily   • QUEtiapine (SEROQUEL) 25 mg, oral, Nightly   • sertraline (ZOLOFT) 100 mg, oral, Daily, Please schedule appointment for further refills   • triamcinolone (Kenalog) 0.1 % cream Topical, 2 times daily, Apply to affected area 1-2 times daily as needed.     Past Medical History:    has no past medical history on file.    Past Surgical History:    has no past surgical history on file.    Allergies:   Allergies   Allergen Reactions   • Chlorhexidine Unknown       Family History:   No family history on file.    Past Social History:   Social History     Tobacco Use   • Smoking status: Never   • Smokeless tobacco: Never   Substance Use Topics   • Alcohol use: Never   • Drug use: Not Currently       Vitals:        Physical Exam:     General Appearance:  No distress, alert, mute but able to nod, cooperative.      Mental State: Limited by muteness    Cranial Nerves:   CN 3, 4, 6: Pupils round, 3 mm in diameter, equally reactive to light. Lids symmetric; no ptosis. EOMs normal alignment, full range with normal saccades, pursuit and convergence. No nystagmus.   CN 5: Facial sensation L>R  CN 7: LMN weakness of R face  CN 8: Hearing intact to voice  CN 9: Palate elevates symmetrically.   CN 11: Weakness of shoulder shrug on the right 0/5  CN 12: Tongue deviate to the right. Atrophy of right side of the tongue.    Motor:   Spasticity exam:  Modified Sergio scale:  R shoulder: Grade 3  R elbow: Grade 2  R knee: Grade 2   R ankle: Grade 2-3    R arm in adduction, elbow flexion, wrist flexion, finger flexion. No fasciculations, tremor or other abnormal movements were present. R leg in semi-extension  position    Strength: R L  Shoulder Abd 0 5  Elbow Flex  0 5  Elbow Ext  0 5    0 5  Hip flexion 0 5  Knee Ext      0 5  Knee Flex    0 5  DorsiFlex    0 5  PlantarFlex 0 5    Reflexes:  R L  Biceps  +3 +2  Brachioradialis +3 +2  Triceps  +3 +2  Wrist clonus positive  Patellar  +3 +2  Achilles +3 +1      Procedure Note: Botox    Indications: Spasticity    Informed consent was obtained (explaining the procedure and risks and benefits of procedure) from patient: the signed consent form was placed in the medical record.  A time out was completed, verifying correct patient, procedure,site, positioning, and implants or special equipment.    spastic dystonia of LUE with finger/thumb flexion, wrist flexion, elbow flexion, LUE pronation  Milder spasticity noted in LLE ankle plantar flexion     Procedure Note  Indications: RUE spasticity     Informed consent was obtained (explaining the procedure and risks and benefits of procedure) from patient: the signed consent form was placed in the medical record. A time out was completed, verifying correct patient, procedure,site, positioning, and implants or special equipment.     200 units of OnabotulinumtoxinA were reconstituted with saline. Sites of injection were identified and cleaned with alcohol pads. Using a 30 gauge needle, patient received following injections:     50 units L biceps (divided between 2 sites)  25 units L pronator teres  25 units L FCU  25 units L FCR  25 units L FDS  25 units L FDP     Used: 175 units  Wasted: 25 units     There were no complications. Patient was comfortable and left without complaint.           Imaging:  No MRI head results found for the past 14 days  No CT head results found for the past 14 days    Assessment/Recommendations  New CARABALLO Ignacio is a 69 y.o. Handed: right handed male with a PMHx of L MCA ischemic stroke c/b b/l SAH (06/2023), HTN, HLD, depression who is referred for advanced spasticity management. Main concern is ROM,  posture and appearance of the RUE. Exam positive for moderate RUE spasticity with dystonic flexion of the elbow, wrist, and fingers. No tonic spasms, clonus, myoclonus, or tremor on exam. Patient can be a good candidate for oral baclofen and targeted botulinum toxin injection to the following muscles on the right upper extremity: biceps 50 units, FDS 25 units, FCU 25 units, and FCR 25 units. The doses can be increased over time as tolerated.      7/24/2025: first injection session. Tolerated well.     PLAN:      You were injected with a total of 175 units of botox. You tolerated the procedure well. The effect of botox usually kicks in after 3 to 5 days and lasts for three months. Please call office after about a week to report how you are doing. Follow up after three months for repeat injections.     Please continue the same dose of baclofen.     Please continue physical and occupational therapy.     Please continue daily stretching.     Thank you visiting the clinic today      Abby Tolentino MD  PGY-5  Vascular Neurology Fellow    Attending note:    I edited the note above and co-wrote the assessment and plan.       Medical decision making was moderate complexity.     Ced Vasquez MD      Neurology Botulinum Injection    Subjective   New Pierre is an 69 y.o. male here for medical botulinum injection for Muscle spasticity [M62.838]. ***    Objective   Neurological Exam  Physical Exam  Procedures  Assessment/Plan   ***  {Assess/PlanSmartLinks:[id number]}

## 2025-08-06 ENCOUNTER — TELEPHONE (OUTPATIENT)
Dept: PRIMARY CARE | Facility: CLINIC | Age: 70
End: 2025-08-06
Payer: MEDICARE

## 2025-08-06 ENCOUNTER — PATIENT OUTREACH (OUTPATIENT)
Dept: PRIMARY CARE | Facility: CLINIC | Age: 70
End: 2025-08-06
Payer: MEDICARE

## 2025-08-06 DIAGNOSIS — Z12.11 SCREENING FOR COLORECTAL CANCER: ICD-10-CM

## 2025-08-06 DIAGNOSIS — Z12.12 SCREENING FOR COLORECTAL CANCER: ICD-10-CM

## 2025-08-06 NOTE — PROGRESS NOTES
I called and spoke with pt's wife Zabrina to inform her that I am leaving this position and they will be working with Sarah Botello RN as their new Care Manager starting this month. She requested a new referral for PT. This request will be conveyed to Dr. Foreman.

## 2025-08-08 ENCOUNTER — APPOINTMENT (OUTPATIENT)
Dept: PRIMARY CARE | Facility: CLINIC | Age: 70
End: 2025-08-08
Payer: MEDICARE

## 2025-08-08 VITALS — HEART RATE: 81 BPM | OXYGEN SATURATION: 95 % | DIASTOLIC BLOOD PRESSURE: 80 MMHG | SYSTOLIC BLOOD PRESSURE: 126 MMHG

## 2025-08-08 DIAGNOSIS — G40.209 PARTIAL SYMPTOMATIC EPILEPSY WITH COMPLEX PARTIAL SEIZURES, NOT INTRACTABLE, WITHOUT STATUS EPILEPTICUS: ICD-10-CM

## 2025-08-08 DIAGNOSIS — F41.8 DEPRESSION WITH ANXIETY: ICD-10-CM

## 2025-08-08 DIAGNOSIS — I69.351 HEMIPLEGIA AND HEMIPARESIS FOLLOWING CEREBRAL INFARCTION AFFECTING RIGHT DOMINANT SIDE (MULTI): Primary | ICD-10-CM

## 2025-08-08 DIAGNOSIS — I69.390 APRAXIA FOLLOWING CEREBROVASCULAR ACCIDENT: ICD-10-CM

## 2025-08-08 DIAGNOSIS — I69.320 APHASIA FOLLOWING CEREBRAL INFARCTION: ICD-10-CM

## 2025-08-08 DIAGNOSIS — E78.2 MIXED HYPERLIPIDEMIA: ICD-10-CM

## 2025-08-08 DIAGNOSIS — L30.9 DERMATITIS: ICD-10-CM

## 2025-08-08 DIAGNOSIS — R73.01 ABNORMAL FASTING GLUCOSE: ICD-10-CM

## 2025-08-08 DIAGNOSIS — Z12.5 PROSTATE CANCER SCREENING: ICD-10-CM

## 2025-08-08 PROBLEM — N39.45 CONTINUOUS LEAKAGE OF URINE: Status: RESOLVED | Noted: 2023-12-21 | Resolved: 2025-08-08

## 2025-08-08 PROBLEM — J96.01 ACUTE RESPIRATORY FAILURE WITH HYPOXIA: Status: RESOLVED | Noted: 2025-08-08 | Resolved: 2025-08-08

## 2025-08-08 PROBLEM — J96.01 ACUTE RESPIRATORY FAILURE WITH HYPOXIA: Status: ACTIVE | Noted: 2025-08-08

## 2025-08-08 PROCEDURE — 1159F MED LIST DOCD IN RCRD: CPT | Performed by: FAMILY MEDICINE

## 2025-08-08 PROCEDURE — 3079F DIAST BP 80-89 MM HG: CPT | Performed by: FAMILY MEDICINE

## 2025-08-08 PROCEDURE — G2211 COMPLEX E/M VISIT ADD ON: HCPCS | Performed by: FAMILY MEDICINE

## 2025-08-08 PROCEDURE — 1160F RVW MEDS BY RX/DR IN RCRD: CPT | Performed by: FAMILY MEDICINE

## 2025-08-08 PROCEDURE — 3074F SYST BP LT 130 MM HG: CPT | Performed by: FAMILY MEDICINE

## 2025-08-08 PROCEDURE — 99214 OFFICE O/P EST MOD 30 MIN: CPT | Performed by: FAMILY MEDICINE

## 2025-08-08 RX ORDER — TRIAMCINOLONE ACETONIDE 1 MG/G
CREAM TOPICAL 2 TIMES DAILY
Qty: 30 G | Refills: 0 | Status: SHIPPED | OUTPATIENT
Start: 2025-08-08

## 2025-08-08 RX ORDER — SERTRALINE HYDROCHLORIDE 100 MG/1
100 TABLET, FILM COATED ORAL DAILY
Qty: 90 TABLET | Refills: 1 | Status: SHIPPED | OUTPATIENT
Start: 2025-08-08

## 2025-08-08 RX ORDER — LEVETIRACETAM 100 MG/ML
500 SOLUTION ORAL 2 TIMES DAILY
Qty: 900 ML | Refills: 3 | Status: SHIPPED | OUTPATIENT
Start: 2025-08-08 | End: 2026-08-08

## 2025-08-08 RX ORDER — ATORVASTATIN CALCIUM 40 MG/1
40 TABLET, FILM COATED ORAL DAILY
Qty: 90 TABLET | Refills: 1 | Status: SHIPPED | OUTPATIENT
Start: 2025-08-08

## 2025-08-08 RX ORDER — BACLOFEN 10 MG/1
10 TABLET ORAL NIGHTLY
Qty: 90 TABLET | Refills: 0 | Status: SHIPPED | OUTPATIENT
Start: 2025-08-08 | End: 2025-11-06

## 2025-08-08 ASSESSMENT — ENCOUNTER SYMPTOMS
OCCASIONAL FEELINGS OF UNSTEADINESS: 1
DEPRESSION: 1
LOSS OF SENSATION IN FEET: 0

## 2025-08-13 ENCOUNTER — EVALUATION (OUTPATIENT)
Dept: OCCUPATIONAL THERAPY | Facility: CLINIC | Age: 70
End: 2025-08-13
Payer: MEDICARE

## 2025-08-13 DIAGNOSIS — G81.11 RIGHT SPASTIC HEMIPLEGIA (MULTI): Primary | ICD-10-CM

## 2025-08-13 PROCEDURE — 97165 OT EVAL LOW COMPLEX 30 MIN: CPT | Mod: GO

## 2025-08-13 PROCEDURE — 97530 THERAPEUTIC ACTIVITIES: CPT | Mod: GO

## 2025-08-14 ENCOUNTER — TELEPHONE (OUTPATIENT)
Dept: PRIMARY CARE | Facility: CLINIC | Age: 70
End: 2025-08-14
Payer: MEDICARE

## 2025-08-15 ENCOUNTER — PATIENT OUTREACH (OUTPATIENT)
Dept: PRIMARY CARE | Facility: CLINIC | Age: 70
End: 2025-08-15
Payer: MEDICARE

## 2025-08-15 DIAGNOSIS — I10 PRIMARY HYPERTENSION: ICD-10-CM

## 2025-08-15 DIAGNOSIS — I63.9 ISCHEMIC CEREBROVASCULAR ACCIDENT (CVA) (MULTI): ICD-10-CM

## 2025-08-15 DIAGNOSIS — I69.351 HEMIPLEGIA AND HEMIPARESIS FOLLOWING CEREBRAL INFARCTION AFFECTING RIGHT DOMINANT SIDE (MULTI): ICD-10-CM

## 2025-08-20 ENCOUNTER — TREATMENT (OUTPATIENT)
Dept: SPEECH THERAPY | Facility: CLINIC | Age: 70
End: 2025-08-20
Payer: MEDICARE

## 2025-08-20 DIAGNOSIS — I69.320 APHASIA FOLLOWING CEREBRAL INFARCTION: ICD-10-CM

## 2025-08-20 DIAGNOSIS — I69.390 APRAXIA FOLLOWING CEREBROVASCULAR ACCIDENT: ICD-10-CM

## 2025-08-20 PROCEDURE — 92507 TX SP LANG VOICE COMM INDIV: CPT | Mod: GN,KX

## 2025-08-20 ASSESSMENT — PAIN - FUNCTIONAL ASSESSMENT: PAIN_FUNCTIONAL_ASSESSMENT: 0-10

## 2025-08-20 ASSESSMENT — PAIN SCALES - GENERAL: PAINLEVEL_OUTOF10: 0 - NO PAIN

## 2025-08-27 ENCOUNTER — TREATMENT (OUTPATIENT)
Dept: SPEECH THERAPY | Facility: CLINIC | Age: 70
End: 2025-08-27
Payer: MEDICARE

## 2025-08-27 DIAGNOSIS — I69.320 APHASIA FOLLOWING CEREBRAL INFARCTION: ICD-10-CM

## 2025-08-27 DIAGNOSIS — I69.390 APRAXIA FOLLOWING CEREBROVASCULAR ACCIDENT: ICD-10-CM

## 2025-08-27 PROCEDURE — 92507 TX SP LANG VOICE COMM INDIV: CPT | Mod: GN

## 2025-08-27 ASSESSMENT — PAIN SCALES - GENERAL: PAINLEVEL_OUTOF10: 0 - NO PAIN

## 2025-08-27 ASSESSMENT — PAIN - FUNCTIONAL ASSESSMENT: PAIN_FUNCTIONAL_ASSESSMENT: 0-10

## 2025-09-03 ENCOUNTER — TREATMENT (OUTPATIENT)
Dept: SPEECH THERAPY | Facility: CLINIC | Age: 70
End: 2025-09-03
Payer: MEDICARE

## 2025-09-03 DIAGNOSIS — I69.320 APHASIA FOLLOWING CEREBRAL INFARCTION: ICD-10-CM

## 2025-09-03 DIAGNOSIS — I69.390 APRAXIA FOLLOWING CEREBROVASCULAR ACCIDENT: ICD-10-CM

## 2025-09-03 PROCEDURE — 92507 TX SP LANG VOICE COMM INDIV: CPT | Mod: GN,KX

## 2025-09-03 ASSESSMENT — PAIN SCALES - GENERAL: PAINLEVEL_OUTOF10: 0 - NO PAIN

## 2025-09-03 ASSESSMENT — PAIN - FUNCTIONAL ASSESSMENT: PAIN_FUNCTIONAL_ASSESSMENT: 0-10

## 2025-09-04 ENCOUNTER — APPOINTMENT (OUTPATIENT)
Dept: NEUROLOGY | Facility: CLINIC | Age: 70
End: 2025-09-04
Payer: MEDICARE

## 2025-10-24 ENCOUNTER — APPOINTMENT (OUTPATIENT)
Dept: NEUROLOGY | Facility: CLINIC | Age: 70
End: 2025-10-24
Payer: MEDICARE

## 2025-11-11 ENCOUNTER — APPOINTMENT (OUTPATIENT)
Dept: PRIMARY CARE | Facility: CLINIC | Age: 70
End: 2025-11-11
Payer: MEDICARE

## 2025-11-13 ENCOUNTER — APPOINTMENT (OUTPATIENT)
Dept: NEUROLOGY | Facility: CLINIC | Age: 70
End: 2025-11-13
Payer: MEDICARE